# Patient Record
Sex: FEMALE | Race: BLACK OR AFRICAN AMERICAN | NOT HISPANIC OR LATINO | Employment: FULL TIME | URBAN - METROPOLITAN AREA
[De-identification: names, ages, dates, MRNs, and addresses within clinical notes are randomized per-mention and may not be internally consistent; named-entity substitution may affect disease eponyms.]

---

## 2022-09-07 PROBLEM — Z01.419 WELL WOMAN EXAM: Status: ACTIVE | Noted: 2022-09-07

## 2022-09-08 ENCOUNTER — INITIAL PRENATAL (OUTPATIENT)
Dept: OBSTETRICS AND GYNECOLOGY | Facility: CLINIC | Age: 32
End: 2022-09-08

## 2022-09-08 ENCOUNTER — LAB (OUTPATIENT)
Dept: LAB | Facility: HOSPITAL | Age: 32
End: 2022-09-08

## 2022-09-08 VITALS
BODY MASS INDEX: 34.82 KG/M2 | DIASTOLIC BLOOD PRESSURE: 86 MMHG | HEIGHT: 62 IN | SYSTOLIC BLOOD PRESSURE: 130 MMHG | WEIGHT: 189.2 LBS

## 2022-09-08 DIAGNOSIS — Z34.81 PRENATAL CARE, SUBSEQUENT PREGNANCY, FIRST TRIMESTER: ICD-10-CM

## 2022-09-08 DIAGNOSIS — O34.219 HISTORY OF CESAREAN SECTION COMPLICATING PREGNANCY: ICD-10-CM

## 2022-09-08 DIAGNOSIS — Z34.81 PRENATAL CARE, SUBSEQUENT PREGNANCY, FIRST TRIMESTER: Primary | ICD-10-CM

## 2022-09-08 LAB
ABO GROUP BLD: NORMAL
AMPHET+METHAMPHET UR QL: NEGATIVE
AMPHETAMINES UR QL: NEGATIVE
BARBITURATES UR QL SCN: NEGATIVE
BASOPHILS # BLD AUTO: 0.01 10*3/MM3 (ref 0–0.2)
BASOPHILS NFR BLD AUTO: 0.2 % (ref 0–1.5)
BENZODIAZ UR QL SCN: NEGATIVE
BLD GP AB SCN SERPL QL: NEGATIVE
BUPRENORPHINE SERPL-MCNC: NEGATIVE NG/ML
C TRACH RRNA SPEC DONR QL NAA+PROBE: NEGATIVE
CANNABINOIDS SERPL QL: NEGATIVE
COCAINE UR QL: NEGATIVE
DEPRECATED RDW RBC AUTO: 40.7 FL (ref 37–54)
EOSINOPHIL # BLD AUTO: 0.01 10*3/MM3 (ref 0–0.4)
EOSINOPHIL NFR BLD AUTO: 0.2 % (ref 0.3–6.2)
ERYTHROCYTE [DISTWIDTH] IN BLOOD BY AUTOMATED COUNT: 14.7 % (ref 12.3–15.4)
HBV SURFACE AG SERPL QL IA: NORMAL
HCT VFR BLD AUTO: 34.6 % (ref 34–46.6)
HCV AB SER DONR QL: NORMAL
HGB BLD-MCNC: 10.8 G/DL (ref 12–15.9)
HIV1+2 AB SER QL: NORMAL
IMM GRANULOCYTES # BLD AUTO: 0.02 10*3/MM3 (ref 0–0.05)
IMM GRANULOCYTES NFR BLD AUTO: 0.3 % (ref 0–0.5)
LYMPHOCYTES # BLD AUTO: 2.19 10*3/MM3 (ref 0.7–3.1)
LYMPHOCYTES NFR BLD AUTO: 33.5 % (ref 19.6–45.3)
MCH RBC QN AUTO: 24.3 PG (ref 26.6–33)
MCHC RBC AUTO-ENTMCNC: 31.2 G/DL (ref 31.5–35.7)
MCV RBC AUTO: 77.8 FL (ref 79–97)
METHADONE UR QL SCN: NEGATIVE
MONOCYTES # BLD AUTO: 0.29 10*3/MM3 (ref 0.1–0.9)
MONOCYTES NFR BLD AUTO: 4.4 % (ref 5–12)
N GONORRHOEA DNA SPEC QL NAA+PROBE: NEGATIVE
NEUTROPHILS NFR BLD AUTO: 4.02 10*3/MM3 (ref 1.7–7)
NEUTROPHILS NFR BLD AUTO: 61.4 % (ref 42.7–76)
NRBC BLD AUTO-RTO: 0 /100 WBC (ref 0–0.2)
OPIATES UR QL: NEGATIVE
OXYCODONE UR QL SCN: NEGATIVE
PCP UR QL SCN: NEGATIVE
PLATELET # BLD AUTO: 263 10*3/MM3 (ref 140–450)
PMV BLD AUTO: 13.5 FL (ref 6–12)
PROPOXYPH UR QL: NEGATIVE
RBC # BLD AUTO: 4.45 10*6/MM3 (ref 3.77–5.28)
RH BLD: POSITIVE
RPR SER QL: NORMAL
TRICYCLICS UR QL SCN: NEGATIVE
WBC NRBC COR # BLD: 6.54 10*3/MM3 (ref 3.4–10.8)

## 2022-09-08 PROCEDURE — 87086 URINE CULTURE/COLONY COUNT: CPT | Performed by: OBSTETRICS & GYNECOLOGY

## 2022-09-08 PROCEDURE — 80081 OBSTETRIC PANEL INC HIV TSTG: CPT

## 2022-09-08 PROCEDURE — 0501F PRENATAL FLOW SHEET: CPT | Performed by: OBSTETRICS & GYNECOLOGY

## 2022-09-08 PROCEDURE — 86803 HEPATITIS C AB TEST: CPT

## 2022-09-08 PROCEDURE — 80306 DRUG TEST PRSMV INSTRMNT: CPT | Performed by: OBSTETRICS & GYNECOLOGY

## 2022-09-08 RX ORDER — CLINDAMYCIN HYDROCHLORIDE 300 MG/1
CAPSULE ORAL EVERY 12 HOURS SCHEDULED
COMMUNITY
End: 2022-09-08

## 2022-09-08 NOTE — PROGRESS NOTES
"Subjective   Chief Complaint   Patient presents with   • Initial Prenatal Visit     US done today / last pap smear was ~ 2 months ago at her PCP       Aye Nance is a 32 y.o. year old .  Patient's last menstrual period was 2022.  She presents to be seen to initiate prenatal care.  She reports a remote history of abnormal Pap smears but her last one by her primary care provider at the end of July was normal.    Social History    Tobacco Use      Smoking status: Never Smoker      Smokeless tobacco: Never Used      The following portions of the patient's history were reviewed and updated as appropriate:vital signs, allergies, current medications, past family history, past medical history, past social history, past surgical history and problem list.    Objective   /86   Ht 157.5 cm (62\")   Wt 85.8 kg (189 lb 3.2 oz)   LMP 2022   BMI 34.61 kg/m²     General: well developed; well nourished  no acute distress   Skin: No suspicious lesions seen   Thyroid: normal to inspection and palpation   Heart:  Not performed.   Lungs: breathing is unlabored   Breasts:  Examined in supine position  Symmetric without masses or skin dimpling  Nipples normal without inversion, lesions or discharge  There are no palpable axillary nodes   Abdomen: soft, non-tender; no masses  no umbilical or inguinal hernias are present  no hepato-splenomegaly   Pelvis: Clinical staff was present for exam  External genitalia:  normal appearance of the external genitalia including Bartholin's and Ceylon's glands.  :  urethral meatus normal;  Vaginal:  normal pink mucosa without prolapse or lesions.  Cervix:  normal appearance.  Uterus:  normal size, shape and consistency.  Adnexa:  normal bimanual exam of the adnexa.  Rectal:  digital rectal exam not performed; anus visually normal appearing.       Lab Review   No data reviewed    Imaging   Pelvic ultrasound report    Assessment & Plan   ASSESSMENT  1. 32 y.o. year old "  at 8w0d confirmed by ultrasound today   2. Supervision of low risk pregnancy  3. Previous C/S with route of delivery to be determined    PLAN  1. The problem list for pregnancy was initiated today  2. Tests ordered today:  Orders Placed This Encounter   Procedures   • Chlamydia trachomatis, Neisseria gonorrhoeae, Trichomonas vaginalis, PCR - Swab, Cervix     Standing Status:   Future     Number of Occurrences:   1     Standing Expiration Date:   10/8/2022   • Urine Culture - Urine, Urine, Clean Catch     Standing Status:   Future     Number of Occurrences:   1     Standing Expiration Date:   2023     Order Specific Question:   Release to patient     Answer:   Immediate   • OB Panel With HIV     Standing Status:   Future     Number of Occurrences:   1     Standing Expiration Date:   2023   • Hepatitis C Antibody     Standing Status:   Future     Number of Occurrences:   1     Standing Expiration Date:   2023   • Urine Drug Screen - Urine, Clean Catch     Please confirm all positive UDS     Standing Status:   Future     Number of Occurrences:   1     Standing Expiration Date:   2023     3. Testing for GC / Chlamydia / trichomonas was done today   4. The following data needs to be obtained to update her medical records: last PAP.  5. Genetic testing reviewed: NIPT ([facility]) and they will consider the information and make a decision at a later date.  6. Information reviewed: exercise in pregnancy, nutrition in pregnancy, weight gain in pregnancy, work and travel restrictions during pregnancy, list of OTC medications acceptable in pregnancy and call coverage groups    Total time spent today with Aye  was 45-59 minutes (level 4).  Of this time, > 50% was spent face-to-face time coordinating care, answering her questions and counseling regarding pathophysiology of her presenting problem along with plans for any diagnositc work-up and treatment.    Follow up: 4 week(s)       This note was  electronically signed.    Tootie Maher MD  September 8, 2022

## 2022-09-09 LAB
BACTERIA SPEC AEROBE CULT: NORMAL
RUBV IGG SERPL IA-ACNC: 1.42 INDEX

## 2022-09-11 DIAGNOSIS — O99.019 MATERNAL ANEMIA IN PREGNANCY, ANTEPARTUM: Primary | ICD-10-CM

## 2022-09-11 RX ORDER — FERROUS SULFATE 325(65) MG
325 TABLET ORAL
Qty: 30 TABLET | Refills: 11 | Status: SHIPPED | OUTPATIENT
Start: 2022-09-11

## 2022-09-12 ENCOUNTER — DOCUMENTATION (OUTPATIENT)
Dept: OBSTETRICS AND GYNECOLOGY | Facility: CLINIC | Age: 32
End: 2022-09-12

## 2022-09-12 ENCOUNTER — TELEPHONE (OUTPATIENT)
Dept: OBSTETRICS AND GYNECOLOGY | Facility: CLINIC | Age: 32
End: 2022-09-12

## 2022-10-13 ENCOUNTER — ROUTINE PRENATAL (OUTPATIENT)
Dept: OBSTETRICS AND GYNECOLOGY | Facility: CLINIC | Age: 32
End: 2022-10-13

## 2022-10-13 ENCOUNTER — LAB (OUTPATIENT)
Dept: LAB | Facility: HOSPITAL | Age: 32
End: 2022-10-13

## 2022-10-13 VITALS — BODY MASS INDEX: 38.04 KG/M2 | SYSTOLIC BLOOD PRESSURE: 122 MMHG | DIASTOLIC BLOOD PRESSURE: 74 MMHG | WEIGHT: 208 LBS

## 2022-10-13 DIAGNOSIS — O34.219 HISTORY OF CESAREAN SECTION COMPLICATING PREGNANCY: ICD-10-CM

## 2022-10-13 DIAGNOSIS — O99.019 MATERNAL ANEMIA IN PREGNANCY, ANTEPARTUM: ICD-10-CM

## 2022-10-13 DIAGNOSIS — Z34.82 PRENATAL CARE, SUBSEQUENT PREGNANCY IN SECOND TRIMESTER: ICD-10-CM

## 2022-10-13 DIAGNOSIS — Z3A.13 13 WEEKS GESTATION OF PREGNANCY: Primary | ICD-10-CM

## 2022-10-13 LAB
FERRITIN SERPL-MCNC: 32.8 NG/ML (ref 13–150)
FOLATE SERPL-MCNC: >20 NG/ML (ref 4.78–24.2)
IRON 24H UR-MRATE: 137 MCG/DL (ref 37–145)
IRON SATN MFR SERPL: 33 % (ref 20–50)
TIBC SERPL-MCNC: 411 MCG/DL (ref 298–536)
TRANSFERRIN SERPL-MCNC: 276 MG/DL (ref 200–360)
VIT B12 BLD-MCNC: 472 PG/ML (ref 211–946)

## 2022-10-13 PROCEDURE — 82728 ASSAY OF FERRITIN: CPT

## 2022-10-13 PROCEDURE — 36415 COLL VENOUS BLD VENIPUNCTURE: CPT

## 2022-10-13 PROCEDURE — 84466 ASSAY OF TRANSFERRIN: CPT

## 2022-10-13 PROCEDURE — 82607 VITAMIN B-12: CPT

## 2022-10-13 PROCEDURE — 83540 ASSAY OF IRON: CPT

## 2022-10-13 PROCEDURE — 83020 HEMOGLOBIN ELECTROPHORESIS: CPT

## 2022-10-13 PROCEDURE — 0502F SUBSEQUENT PRENATAL CARE: CPT | Performed by: NURSE PRACTITIONER

## 2022-10-13 PROCEDURE — 82746 ASSAY OF FOLIC ACID SERUM: CPT

## 2022-10-13 NOTE — PROGRESS NOTES
Chief Complaint   Patient presents with   • Routine Prenatal Visit       HPI: Aye is a  currently at 13w0d who today reports the following:  Nausea - No; Vaginal bleeding -  No; Heartburn - No.    ROS:  GI: Constipation - YES; has some constipation with iron useDiarrhea - No    Neuro: Headache - No; Visual change - No      EXAM:  Vitals: See prenatal flowsheet   Abdomen: See prenatal flowsheet   Urine glucose/protein: See prenatal flowsheet   Pelvic: See prenatal flowsheet     Prenatal Labs  Lab Results   Component Value Date    HGB 10.8 (L) 2022    RUBELLAABIGG 1.42 2022    HEPBSAG Non-Reactive 2022    ABSCRN Negative 2022    FLD2AOV6 Non-Reactive 2022    HEPCVIRUSABY Non-Reactive 2022    URINECX 25,000 CFU/mL Mixed Mera Isolated 2022    CHLAMNAA negative 2022    NGONORRHON negative 2022       MDM:  Impression: 1. Supervision of low risk pregnancy  2. Previous C/S with route of delivery to be determined  3. Anemia in pregnancy   Tests done today: 1. nipt and cf sma carrier screening  2. Requested anemia work up labs from [facility]   Topics discussed: 1. Continue with PNV's  2. Prenatal labs reviewed  3. bleeding, pain, nausea precautions reviewed   Tests scheduled today for her next visit:   none

## 2022-10-14 LAB
HGB A MFR BLD ELPH: 97.6 % (ref 96.4–98.8)
HGB A2 MFR BLD ELPH: 2.4 % (ref 1.8–3.2)
HGB F MFR BLD ELPH: 0 % (ref 0–2)
HGB FRACT BLD-IMP: NORMAL
HGB S MFR BLD ELPH: 0 %

## 2022-10-31 ENCOUNTER — TELEPHONE (OUTPATIENT)
Dept: OBSTETRICS AND GYNECOLOGY | Facility: CLINIC | Age: 32
End: 2022-10-31

## 2022-11-02 ENCOUNTER — TELEPHONE (OUTPATIENT)
Dept: OBSTETRICS AND GYNECOLOGY | Facility: CLINIC | Age: 32
End: 2022-11-02

## 2022-11-02 NOTE — TELEPHONE ENCOUNTER
Nika Justin is wanting an ultrasound for her appt on 11/10/22. She just wants to check how the baby is doing.

## 2022-11-02 NOTE — TELEPHONE ENCOUNTER
Pt returned call to office and was advised that we could do a gender scan for $50 or she could wait until her 20 week scan when the anatomy scan is done.  Pt stated that she would wait until the 20 week scan.

## 2022-11-10 ENCOUNTER — ROUTINE PRENATAL (OUTPATIENT)
Dept: OBSTETRICS AND GYNECOLOGY | Facility: CLINIC | Age: 32
End: 2022-11-10

## 2022-11-10 VITALS — SYSTOLIC BLOOD PRESSURE: 114 MMHG | BODY MASS INDEX: 34.42 KG/M2 | DIASTOLIC BLOOD PRESSURE: 76 MMHG | WEIGHT: 188.2 LBS

## 2022-11-10 DIAGNOSIS — O34.219 HISTORY OF CESAREAN SECTION COMPLICATING PREGNANCY: ICD-10-CM

## 2022-11-10 DIAGNOSIS — Z3A.17 17 WEEKS GESTATION OF PREGNANCY: Primary | ICD-10-CM

## 2022-11-10 DIAGNOSIS — Z34.82 PRENATAL CARE, SUBSEQUENT PREGNANCY IN SECOND TRIMESTER: ICD-10-CM

## 2022-11-10 LAB
GLUCOSE UR STRIP-MCNC: ABNORMAL MG/DL
PROT UR STRIP-MCNC: NEGATIVE MG/DL

## 2022-11-10 PROCEDURE — 0502F SUBSEQUENT PRENATAL CARE: CPT | Performed by: NURSE PRACTITIONER

## 2022-11-10 NOTE — PROGRESS NOTES
Chief Complaint   Patient presents with   • Routine Prenatal Visit     Some cramping when overexerted physically or dehydrated, no c/c other than that       HPI: Aye is a  currently at 17w0d who today reports the following:  Contractions - No; Leaking - No; Vaginal bleeding -  No; Heartburn - No.    ROS:  GI: Nausea - No ; Constipation - No; Diarrhea - No    Neuro: Headache - No ; Visual change - No      EXAM:  Vitals: See prenatal flowsheet   Abdomen: See prenatal flowsheet   Urine glucose/protein: See prenatal flowsheet   Pelvic: See prenatal flowsheet     Lab Results   Component Value Date    ABO O 2022    RH Positive 2022    ABSCRN Negative 2022       MDM:  Impression: 1. Supervision of low risk pregnancy  2. Previous C/S with route of delivery to be determined   Tests done today: 1. none   Topics discussed: 1. Continue with PNV's  2. Prenatal labs reviewed  3. bleeding and nausea precautions reviewed    4. NIPT low risk results reviewed, having gender reveal at home   Tests scheduled today for her next visit:   U/S for anatomic screening

## 2022-12-08 ENCOUNTER — ROUTINE PRENATAL (OUTPATIENT)
Dept: OBSTETRICS AND GYNECOLOGY | Facility: CLINIC | Age: 32
End: 2022-12-08

## 2022-12-08 VITALS — DIASTOLIC BLOOD PRESSURE: 60 MMHG | WEIGHT: 192.8 LBS | SYSTOLIC BLOOD PRESSURE: 100 MMHG | BODY MASS INDEX: 35.26 KG/M2

## 2022-12-08 DIAGNOSIS — Z34.82 PRENATAL CARE, SUBSEQUENT PREGNANCY IN SECOND TRIMESTER: Primary | ICD-10-CM

## 2022-12-08 DIAGNOSIS — O99.019 MATERNAL ANEMIA IN PREGNANCY, ANTEPARTUM: ICD-10-CM

## 2022-12-08 DIAGNOSIS — O34.219 HISTORY OF CESAREAN SECTION COMPLICATING PREGNANCY: ICD-10-CM

## 2022-12-08 PROCEDURE — 0502F SUBSEQUENT PRENATAL CARE: CPT | Performed by: OBSTETRICS & GYNECOLOGY

## 2022-12-08 RX ORDER — DOCUSATE SODIUM 100 MG/1
100 CAPSULE, LIQUID FILLED ORAL DAILY PRN
COMMUNITY

## 2022-12-08 NOTE — PROGRESS NOTES
Chief Complaint   Patient presents with   • Routine Prenatal Visit     US done today        HPI: Aye is a  currently at 21w0d who today reports the following:  Contractions - No; Leaking - No; Vaginal bleeding -  No; Heartburn - No.    ROS:  GI: Nausea - No ; Constipation - No; Diarrhea - No    Neuro: Headache - No ; Visual change - No      EXAM:  Vitals: See prenatal flowsheet   Abdomen: See prenatal flowsheet   Urine glucose/protein: See prenatal flowsheet   Pelvic: See prenatal flowsheet     Lab Results   Component Value Date    ABO O 2022    RH Positive 2022    ABSCRN Negative 2022       MDM:  Impression: 1. Supervision of low risk pregnancy  2. Previous C/S with route of delivery to be determined   3. Anemia in pregnancy    Tests done today: 1. U/S for anatomic screening - anatomy was not completely seen today   Topics discussed: 1. Continue with PNV's  2. Prenatal labs reviewed  3. Interval of future visits    4. Continue oral iron   Tests scheduled today for her next visit:   U/S to complete the anatomic screening  GCT and CBC

## 2023-01-05 ENCOUNTER — LAB (OUTPATIENT)
Dept: LAB | Facility: HOSPITAL | Age: 33
End: 2023-01-05
Payer: COMMERCIAL

## 2023-01-05 ENCOUNTER — ROUTINE PRENATAL (OUTPATIENT)
Dept: OBSTETRICS AND GYNECOLOGY | Facility: CLINIC | Age: 33
End: 2023-01-05
Payer: COMMERCIAL

## 2023-01-05 VITALS — WEIGHT: 195 LBS | DIASTOLIC BLOOD PRESSURE: 60 MMHG | BODY MASS INDEX: 35.67 KG/M2 | SYSTOLIC BLOOD PRESSURE: 110 MMHG

## 2023-01-05 DIAGNOSIS — Z3A.25 25 WEEKS GESTATION OF PREGNANCY: Primary | ICD-10-CM

## 2023-01-05 DIAGNOSIS — Z34.82 PRENATAL CARE, SUBSEQUENT PREGNANCY IN SECOND TRIMESTER: ICD-10-CM

## 2023-01-05 DIAGNOSIS — O99.810 ABNORMAL O'SULLIVAN GLUCOSE CHALLENGE TEST, ANTEPARTUM: ICD-10-CM

## 2023-01-05 DIAGNOSIS — Z34.82 PRENATAL CARE, SUBSEQUENT PREGNANCY, SECOND TRIMESTER: Primary | ICD-10-CM

## 2023-01-05 LAB
DEPRECATED RDW RBC AUTO: 37.4 FL (ref 37–54)
ERYTHROCYTE [DISTWIDTH] IN BLOOD BY AUTOMATED COUNT: 13.9 % (ref 12.3–15.4)
GLUCOSE 1H P 100 G GLC PO SERPL-MCNC: 147 MG/DL (ref 65–139)
GLUCOSE UR STRIP-MCNC: ABNORMAL MG/DL
HCT VFR BLD AUTO: 28.6 % (ref 34–46.6)
HGB BLD-MCNC: 9.5 G/DL (ref 12–15.9)
MCH RBC QN AUTO: 24.5 PG (ref 26.6–33)
MCHC RBC AUTO-ENTMCNC: 33.2 G/DL (ref 31.5–35.7)
MCV RBC AUTO: 73.7 FL (ref 79–97)
PLATELET # BLD AUTO: 202 10*3/MM3 (ref 140–450)
PMV BLD AUTO: 13.5 FL (ref 6–12)
PROT UR STRIP-MCNC: NEGATIVE MG/DL
RBC # BLD AUTO: 3.88 10*6/MM3 (ref 3.77–5.28)
WBC NRBC COR # BLD: 7.15 10*3/MM3 (ref 3.4–10.8)

## 2023-01-05 PROCEDURE — 85027 COMPLETE CBC AUTOMATED: CPT

## 2023-01-05 PROCEDURE — 82962 GLUCOSE BLOOD TEST: CPT

## 2023-01-05 PROCEDURE — 82950 GLUCOSE TEST: CPT

## 2023-01-05 PROCEDURE — 0502F SUBSEQUENT PRENATAL CARE: CPT | Performed by: NURSE PRACTITIONER

## 2023-01-05 NOTE — PROGRESS NOTES
Chief Complaint   Patient presents with   • Routine Prenatal Visit     Ob follow up       HPI: Aye is a  currently at 25w0d who today reports the following: Completed Glucola testing today    Blood sugar was 147.  Contractions - No; Leaking - No; Vaginal bleeding -  No; Heartburn - No.    ROS:  GI: Nausea - No ; Constipation - No; Diarrhea - No    Neuro: Headache - No ; Visual change - No      EXAM:  Vitals: See prenatal flowsheet   Abdomen: See prenatal flowsheet   Urine glucose/protein: See prenatal flowsheet   Pelvic: See prenatal flowsheet     Lab Results   Component Value Date    ABO O 2022    RH Positive 2022    ABSCRN Negative 2022       MDM:  Impression: 1. Supervision of low risk pregnancy  2. Previous C/S with route of delivery to be determined   3. Anemia in pregnancy   4. Elevated 1 hour sugar glucose testing at 147   Tests done today: 1. U/S for anatomic screening - anatomy completely seen today   Topics discussed: 1. Continue with PNV's  2. Prenatal labs reviewed  3.  labor signs and symptoms  4. Symptoms of preeclampsia  5. TDAP vaccination recommended between 27-36 weeks gestation OR after birth   6. Discussed elevated 1 hour sugar test and need for 3-hour testing.   Tests scheduled today for her next visit:   gtt

## 2023-01-06 DIAGNOSIS — O99.810 ABNORMAL O'SULLIVAN GLUCOSE CHALLENGE TEST, ANTEPARTUM: Primary | ICD-10-CM

## 2023-01-19 ENCOUNTER — TELEPHONE (OUTPATIENT)
Dept: OBSTETRICS AND GYNECOLOGY | Facility: CLINIC | Age: 33
End: 2023-01-19
Payer: COMMERCIAL

## 2023-01-19 ENCOUNTER — LAB (OUTPATIENT)
Dept: LAB | Facility: HOSPITAL | Age: 33
End: 2023-01-19
Payer: COMMERCIAL

## 2023-01-19 DIAGNOSIS — O99.810 ABNORMAL O'SULLIVAN GLUCOSE CHALLENGE TEST, ANTEPARTUM: Primary | ICD-10-CM

## 2023-01-19 DIAGNOSIS — Z3A.25 25 WEEKS GESTATION OF PREGNANCY: ICD-10-CM

## 2023-01-19 DIAGNOSIS — O24.410 GDM (GESTATIONAL DIABETES MELLITUS), CLASS A1: Primary | ICD-10-CM

## 2023-01-19 LAB
GLUCOSE P FAST SERPL-MCNC: 98 MG/DL (ref 65–94)
GTT GEST 2H PNL UR+SERPL: 184 MG/DL (ref 65–179)
GTT GEST 3H PNL SERPL: 112 MG/DL (ref 65–139)
GTT GEST 3H PNL SERPL: 169 MG/DL (ref 65–154)

## 2023-01-19 PROCEDURE — 36415 COLL VENOUS BLD VENIPUNCTURE: CPT

## 2023-01-19 PROCEDURE — 82962 GLUCOSE BLOOD TEST: CPT

## 2023-01-19 PROCEDURE — 82951 GLUCOSE TOLERANCE TEST (GTT): CPT

## 2023-01-19 PROCEDURE — 82952 GTT-ADDED SAMPLES: CPT

## 2023-01-19 RX ORDER — LANCETS 28 GAUGE
EACH MISCELLANEOUS
Qty: 120 EACH | Refills: 12 | Status: SHIPPED | OUTPATIENT
Start: 2023-01-19

## 2023-01-19 RX ORDER — BLOOD-GLUCOSE METER
1 KIT MISCELLANEOUS AS NEEDED
Qty: 1 EACH | Refills: 0 | Status: SHIPPED | OUTPATIENT
Start: 2023-01-19

## 2023-01-19 NOTE — TELEPHONE ENCOUNTER
Please inform patient she failed her 3 hour glucose tolerance test which means she has gestational diabetes. I am going to place a diabetic education referral and endocrinology referral. I will also send supplies to her pharmacy for her to start taking her blood sugar 4 times daily - fasting when she first wakes up (goal <95) and 2 hours after each meal (goal <120).     Orders Placed This Encounter   Procedures   • Ambulatory Referral to Diabetic Education     Referral Priority:   Routine     Referral Type:   Health Education     Referral Reason:   Specialty Services Required     Requested Specialty:   Dietitian     Number of Visits Requested:   1   • Ambulatory Referral to Endocrinology     Referral Priority:   Routine     Referral Type:   Consultation     Referral Reason:   Specialty Services Required     Requested Specialty:   Endocrinology     Number of Visits Requested:   1     New Medications Ordered This Visit   Medications   • glucose blood test strip     Sig: Please take your blood sugar fasting (goal <95) and 2 hours after each meal (goal <120) and record.     Dispense:  120 each     Refill:  12   • Lancets (freestyle) lancets     Sig: Please take your blood sugar fasting (goal <95) and 2 hours after each meal (goal <120) and record.     Dispense:  120 each     Refill:  12   • glucose monitor monitoring kit     Si each As Needed (see below). Please check your sugar fasting (goal <95) and 2 hours after each meal (goal <120)     Dispense:  1 each     Refill:  0     Thanks, Tootie Maher MD

## 2023-01-19 NOTE — TELEPHONE ENCOUNTER
Called and informed patient of lab results and advisement from Dr. Maher in full detail, she verified understanding. The patient did have some concerns about being able to get in 4 checks a day, but she stated that she would try and discuss any issues/concerns further with Dr. Maher at her next appt.

## 2023-01-26 ENCOUNTER — OFFICE VISIT (OUTPATIENT)
Dept: ENDOCRINOLOGY | Facility: CLINIC | Age: 33
End: 2023-01-26
Payer: COMMERCIAL

## 2023-01-26 ENCOUNTER — ROUTINE PRENATAL (OUTPATIENT)
Dept: OBSTETRICS AND GYNECOLOGY | Facility: CLINIC | Age: 33
End: 2023-01-26
Payer: COMMERCIAL

## 2023-01-26 ENCOUNTER — DOCUMENTATION (OUTPATIENT)
Dept: DIABETES SERVICES | Facility: HOSPITAL | Age: 33
End: 2023-01-26
Payer: COMMERCIAL

## 2023-01-26 VITALS — DIASTOLIC BLOOD PRESSURE: 60 MMHG | SYSTOLIC BLOOD PRESSURE: 100 MMHG | WEIGHT: 196 LBS | BODY MASS INDEX: 35.85 KG/M2

## 2023-01-26 VITALS — DIASTOLIC BLOOD PRESSURE: 60 MMHG | BODY MASS INDEX: 35.85 KG/M2 | WEIGHT: 196 LBS | SYSTOLIC BLOOD PRESSURE: 100 MMHG

## 2023-01-26 DIAGNOSIS — O34.219 HISTORY OF CESAREAN SECTION COMPLICATING PREGNANCY: ICD-10-CM

## 2023-01-26 DIAGNOSIS — O99.019 MATERNAL ANEMIA IN PREGNANCY, ANTEPARTUM: ICD-10-CM

## 2023-01-26 DIAGNOSIS — Z34.90 PREGNANCY, UNSPECIFIED GESTATIONAL AGE: ICD-10-CM

## 2023-01-26 DIAGNOSIS — Z3A.28 28 WEEKS GESTATION OF PREGNANCY: ICD-10-CM

## 2023-01-26 DIAGNOSIS — O24.410 GDM (GESTATIONAL DIABETES MELLITUS), CLASS A1: Primary | ICD-10-CM

## 2023-01-26 DIAGNOSIS — Z34.83 PRENATAL CARE, SUBSEQUENT PREGNANCY IN THIRD TRIMESTER: ICD-10-CM

## 2023-01-26 DIAGNOSIS — O24.410 DIET CONTROLLED GESTATIONAL DIABETES MELLITUS (GDM) IN THIRD TRIMESTER: Primary | ICD-10-CM

## 2023-01-26 LAB
EXPIRATION DATE: NORMAL
EXPIRATION DATE: NORMAL
GLUCOSE BLDC GLUCOMTR-MCNC: 87 MG/DL (ref 70–130)
GLUCOSE UR STRIP-MCNC: ABNORMAL MG/DL
HBA1C MFR BLD: 5.8 %
Lab: NORMAL
Lab: NORMAL
PROT UR STRIP-MCNC: NEGATIVE MG/DL

## 2023-01-26 PROCEDURE — 90715 TDAP VACCINE 7 YRS/> IM: CPT | Performed by: NURSE PRACTITIONER

## 2023-01-26 PROCEDURE — 82947 ASSAY GLUCOSE BLOOD QUANT: CPT | Performed by: INTERNAL MEDICINE

## 2023-01-26 PROCEDURE — 90471 IMMUNIZATION ADMIN: CPT | Performed by: NURSE PRACTITIONER

## 2023-01-26 PROCEDURE — 83036 HEMOGLOBIN GLYCOSYLATED A1C: CPT | Performed by: INTERNAL MEDICINE

## 2023-01-26 PROCEDURE — 99203 OFFICE O/P NEW LOW 30 MIN: CPT | Performed by: INTERNAL MEDICINE

## 2023-01-26 PROCEDURE — 0502F SUBSEQUENT PRENATAL CARE: CPT | Performed by: NURSE PRACTITIONER

## 2023-01-26 NOTE — PROGRESS NOTES
Chief Complaint   Patient presents with   • Routine Prenatal Visit     Ob follow up       HPI: Aye is a  currently at 28w0d who today reports the following: She completed her 3-hour Glucola and did fail her testing.  She has yet to meet with endocrinology or diabetic educator.  She has not started testing her blood sugars yet.  She reports good fetal movement.  She has some questions about gestational diabetes and potential effects on pregnancy and baby.   Contractions - No; Leaking - No; Vaginal bleeding -  No; Heartburn - No.    ROS:  GI: Nausea - No ; Constipation - No; Diarrhea - No    Neuro: Headache - No ; Visual change - No      EXAM:  Vitals: See prenatal flowsheet   Abdomen: See prenatal flowsheet   Urine glucose/protein: See prenatal flowsheet   Pelvic: See prenatal flowsheet     Lab Results   Component Value Date    ABO O 2022    RH Positive 2022    ABSCRN Negative 2022       MDM:  Impression: 1. Supervision of high risk pregnancy  2. DM - GDMA1   3. Previous  with vaginal delivery due to be determined  4. Tdap vaccine given in office today..   Tests done today: 1. none   Topics discussed: 1. Continue with PNV's  2. Prenatal labs reviewed  3.  labor signs and symptoms  4. TDAP vaccination recommended between 27-36 weeks gestation OR after birth  5. kick counts reviewed   6. Discussed risk of uncontrolled gestational diabetes for baby.  Discussed macrosomia, blood sugar regulation issues after delivery, and increased risk of stillbirth at term.  Reviewed blood sugar monitoring with patient.  Discussed checking blood sugars 4 times daily fasting and 2 hours after meals.  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  7. Encourage patient to keep appointment with diabetic educator and endocrinology.  Discussed with patient if medications are needed for  blood sugar control endocrinology would be the ones to monitor these medications.  She verbalized understanding and agreed with plan of care   Tests scheduled today for her next visit:   U/S for EFW

## 2023-01-26 NOTE — PLAN OF CARE
Patient seen in clinic today for 60 minutes of GDM education. She has had multiple appointments today but was able to return and be seen. Reinforced previous office ed, with importance of monitoring, relaying glucose to provider as instructed, blood sugar goals, and healthy nutrition. Addressed patient specific questions and concerns. She will be followed and supported as needed. Thank you for this opportunity.

## 2023-01-26 NOTE — PROGRESS NOTES
Gestational Diabetes (Consult for MICHAEL Maher)    Eduardo Nance is a 32 y.o. female. she is being seen for consultation today at the request of Dr Maher for evaluation of newly diagnosed gestational diabetes.   Patient is a 32 y.o. female  presented with abnormal OGTT.   This is her second pregnancy, first pregnancy was uneventful. She denies having any complaints. SHe has never seen dietician and didn't start testing glucose yet.   She would like to learn more about gestational diabetes and the diet to follow.       Review of Systems  Review of Systems   Constitutional: Positive for fatigue.     Current medications:  Current Outpatient Medications   Medication Sig Dispense Refill   • docusate sodium (COLACE) 100 MG capsule Take 100 mg by mouth Daily As Needed.     • ferrous sulfate 325 (65 FE) MG tablet Take 1 tablet by mouth Daily With Breakfast. 30 tablet 11   • glucose blood test strip Please take your blood sugar fasting (goal <95) and 2 hours after each meal (goal <120) and record. 120 each 12   • glucose monitor monitoring kit 1 each As Needed (see below). Please check your sugar fasting (goal <95) and 2 hours after each meal (goal <120) 1 each 0   • Lancets (freestyle) lancets Please take your blood sugar fasting (goal <95) and 2 hours after each meal (goal <120) and record. 120 each 12   • Prenatal Vit-Fe Fumarate-FA (PRENATAL VITAMINS PO) Take  by mouth.     • Probiotic Product (PROBIOTIC DAILY PO) Take  by mouth.       No current facility-administered medications for this visit.         Objective      Vitals:    01/26/23 1202   BP: 100/60   Weight: 88.9 kg (196 lb)   Body mass index is 35.85 kg/m².  Physical Exam  Constitutional:       Appearance: Normal appearance.   Cardiovascular:      Rate and Rhythm: Normal rate and regular rhythm.      Heart sounds: Normal heart sounds.   Musculoskeletal:         General: No swelling.   Neurological:      Mental Status: She is alert and oriented to  person, place, and time.   Psychiatric:         Mood and Affect: Mood normal.         Behavior: Behavior normal.         Thought Content: Thought content normal.         LABS AND IMAGING  Office Visit on 01/26/2023   Component Date Value Ref Range Status   • Hemoglobin A1C 01/26/2023 5.8  % Final   • Lot Number 01/26/2023 [id number]   Final   • Expiration Date 01/26/2023 11/03/2024   Final   • Glucose 01/26/2023 87  70 - 130 mg/dL Final   • Lot Number 01/26/2023 [id number]   Final   • Expiration Date 01/26/2023 07/22/2023   Final   Routine Prenatal on 01/26/2023   Component Date Value Ref Range Status   • Glucose, UA 01/26/2023 250 mg/dL (A)  Negative Final   • Protein, POC 01/26/2023 Negative  Negative Final   Lab on 01/19/2023   Component Date Value Ref Range Status   • Gestational Glucose Tolerance, Fas* 01/19/2023 98 (H)  65 - 94 mg/dL Final   • Gestational Glucose Tolerance, 1 H* 01/19/2023 184 (H)  65 - 179 mg/dL Final   • Gestational Glucose Tolerance, 2 H* 01/19/2023 169 (H)  65 - 154 mg/dL Final   • Gestational Glucose Tolerance, 3 H* 01/19/2023 112  65 - 139 mg/dL Final   Routine Prenatal on 01/05/2023   Component Date Value Ref Range Status   • Glucose, UA 01/05/2023 250 mg/dL (A)  Negative Final   • Protein, POC 01/05/2023 Negative  Negative Final   Lab on 01/05/2023   Component Date Value Ref Range Status   • Gestational GCT 01/05/2023 147 (H)  65 - 139 mg/dL Final   • WBC 01/05/2023 7.15  3.40 - 10.80 10*3/mm3 Final   • RBC 01/05/2023 3.88  3.77 - 5.28 10*6/mm3 Final   • Hemoglobin 01/05/2023 9.5 (L)  12.0 - 15.9 g/dL Final   • Hematocrit 01/05/2023 28.6 (L)  34.0 - 46.6 % Final   • MCV 01/05/2023 73.7 (L)  79.0 - 97.0 fL Final   • MCH 01/05/2023 24.5 (L)  26.6 - 33.0 pg Final   • MCHC 01/05/2023 33.2  31.5 - 35.7 g/dL Final   • RDW 01/05/2023 13.9  12.3 - 15.4 % Final   • RDW-SD 01/05/2023 37.4  37.0 - 54.0 fl Final   • MPV 01/05/2023 13.5 (H)  6.0 - 12.0 fL Final   • Platelets 01/05/2023 202  140 -  450 10*3/mm3 Final       1. GDM (gestational diabetes mellitus), class A1    2. Pregnancy, unspecified gestational age        Assessment & Plan      Problem List Items Addressed This Visit        Other    GDM (gestational diabetes mellitus), class A1 - Primary    Relevant Orders    POC Glycosylated Hemoglobin (Hb A1C) (Completed)    POC Glucose, Blood (Completed)   Other Visit Diagnoses     Pregnancy, unspecified gestational age                PLAN  -  Gestational diabetes diagnosis and management reviewed. Glucose goals of < 95 fasting and < 120 2 hours postprandial reviewed. Patient was provided with testing supplies and information on diabetes diet.   She will see diabetic educator/nutritionist later today.   All her questions are answered.   She would send me a weekly glucose log and I will see her back if glucose is above the goal after starting the diet.   I have discussed the possible need for insulin therapy.   The risks associated with uncontrolled gestational diabetes and the management strategies reviewed in details, all their questions were answered.     No follow-ups on file.       I will continue reviewing weekly glucose logs and schedule appointment

## 2023-01-26 NOTE — LETTER
"VACCINE CONSENT FORM      Patient Name:  Aye Nance    Patient :  1990      I/We have read, or have been explained, the information about the diseases and the vaccines listed below.  There was an opportunity to ask questions and any questions were answered satisfactorily.  I/We believe that I/we understand the benefits and risks of the vaccines(s) cited, and ask the vaccine(s) listed below be given to me/us or the person named above (for which i have authorized to make the request).      Vaccine(s) give:    Orders Placed This Encounter   Procedures   • Tdap Vaccine Greater Than or Equal To 8yo IM         Medicare patients:    The only vaccine covered under your medical benefit is flu/pneumonia and hepatitis B.  All other may be covered under your \"Part D\" prescription plan and requires you to go to a pharmacy with a Physician orders for administration.  If you still prefer to have it administered at our office, you will receive a bill for the vaccine and administration cost.               Patient Initials                     Patient or Parent/Guardian Signature                    Date        A copy of the appropriate Centers for Disease Control and Prevention Vaccine Information Statements has been provided.   "

## 2023-02-03 ENCOUNTER — DOCUMENTATION (OUTPATIENT)
Dept: DIABETES SERVICES | Facility: HOSPITAL | Age: 33
End: 2023-02-03
Payer: COMMERCIAL

## 2023-02-09 ENCOUNTER — ROUTINE PRENATAL (OUTPATIENT)
Dept: OBSTETRICS AND GYNECOLOGY | Facility: CLINIC | Age: 33
End: 2023-02-09
Payer: COMMERCIAL

## 2023-02-09 VITALS — WEIGHT: 194 LBS | DIASTOLIC BLOOD PRESSURE: 60 MMHG | BODY MASS INDEX: 35.48 KG/M2 | SYSTOLIC BLOOD PRESSURE: 100 MMHG

## 2023-02-09 DIAGNOSIS — Z34.83 PRENATAL CARE, SUBSEQUENT PREGNANCY IN THIRD TRIMESTER: ICD-10-CM

## 2023-02-09 DIAGNOSIS — O24.410 DIET CONTROLLED GESTATIONAL DIABETES MELLITUS (GDM) IN THIRD TRIMESTER: Primary | ICD-10-CM

## 2023-02-09 DIAGNOSIS — O34.219 HISTORY OF CESAREAN SECTION COMPLICATING PREGNANCY: ICD-10-CM

## 2023-02-09 DIAGNOSIS — Z3A.30 30 WEEKS GESTATION OF PREGNANCY: ICD-10-CM

## 2023-02-09 LAB
GLUCOSE UR STRIP-MCNC: ABNORMAL MG/DL
PROT UR STRIP-MCNC: NEGATIVE MG/DL

## 2023-02-09 PROCEDURE — 0502F SUBSEQUENT PRENATAL CARE: CPT | Performed by: NURSE PRACTITIONER

## 2023-02-09 NOTE — PROGRESS NOTES
Chief Complaint   Patient presents with   • Routine Prenatal Visit     OB FOLLOW UP AFTER US       HPI: Aye is a  currently at 30w0d who today reports the following: She has met with endocrinology and diabetic educator.  She has only been testing her blood sugar sporadically as her work schedule allows and she did run out of strips today as well.  She is planning on getting more strips today.  She is having fasting values over 95.  She thinks 2 of her values over the last week have been over 95.  She sent her blood sugars to endocrinology and they recommended her to send results again by the end of the week.  As she has not had blood testing values for the last 2 days she is going to call endocrinology to discuss possible insulin as they recommended this if her fasting blood sugar stays elevated.  Contractions - No; Leaking - No; Vaginal bleeding -  No; Swelling of extremities - No.    ROS:  GI: Nausea - No; Constipation - No; Diarrhea - No    Neuro: Headache - No; Visual change - No      EXAM:  Vitals: See prenatal flowsheet   Abdomen: See prenatal flowsheet   Urine glucose/protein: See prenatal flowsheet   Pelvic: See prenatal flowsheet   MDM:   Impression: 1. Supervision of high risk pregnancy  2. DM - GDMA1 with suboptimal glucose monitoring   Tests done today: 1. U/S for EFW - 59th percentile overall 3 pounds 9 ounces head circumference 95th percentile abdominal circumference 46 percentile   Topics discussed: 1. Continue with PNV's  2. Prenatal labs reviewed  3.  labor signs and symptoms  4. kick counts reviewed   5. Risk of gestational diabetes reviewed with patient.  Discussed risk of accelerated growth and increased risk of stillbirth at term with uncontrolled gestational diabetes.  Encouraged patient to continue monitoring blood sugars fasting and 2-hour after meals.  If endocrinology recommends insulin start please let us know as we will start twice-weekly testing between 30 to 34 weeks.   Plan for repeat growth ultrasound in 4 weeks.  Patient is interested in continuous glucose monitor and will discuss this with endocrinology.  She verbalizes understanding of plan of care as is her partner who is with her in the office today.   Tests scheduled today for her next visit:   none     Of note glucose urine today was greater than 2000.

## 2023-02-11 RX ORDER — INSULIN DETEMIR 100 [IU]/ML
10 INJECTION, SOLUTION SUBCUTANEOUS NIGHTLY
Qty: 15 ML | Refills: 1 | Status: SHIPPED | OUTPATIENT
Start: 2023-02-11 | End: 2023-02-13 | Stop reason: SDUPTHER

## 2023-02-13 ENCOUNTER — TELEPHONE (OUTPATIENT)
Dept: ENDOCRINOLOGY | Facility: CLINIC | Age: 33
End: 2023-02-13
Payer: COMMERCIAL

## 2023-02-13 RX ORDER — INSULIN DETEMIR 100 [IU]/ML
10 INJECTION, SOLUTION SUBCUTANEOUS NIGHTLY
Qty: 15 ML | Refills: 1 | Status: SHIPPED | OUTPATIENT
Start: 2023-02-13 | End: 2023-03-09

## 2023-02-13 NOTE — TELEPHONE ENCOUNTER
[facility] called stated they need clarification on insulin detemir (Levemir FlexTouch) 100 UNIT/ML injection rx so they can bill her insurance. Please advise.     Call back [phone]

## 2023-02-16 DIAGNOSIS — O24.419 GESTATIONAL DIABETES MELLITUS, CLASS A2: Primary | ICD-10-CM

## 2023-02-16 PROBLEM — Z34.83 PRENATAL CARE, SUBSEQUENT PREGNANCY IN THIRD TRIMESTER: Status: ACTIVE | Noted: 2022-09-08

## 2023-02-23 ENCOUNTER — ROUTINE PRENATAL (OUTPATIENT)
Dept: OBSTETRICS AND GYNECOLOGY | Facility: CLINIC | Age: 33
End: 2023-02-23
Payer: COMMERCIAL

## 2023-02-23 VITALS — WEIGHT: 194 LBS | BODY MASS INDEX: 35.48 KG/M2 | DIASTOLIC BLOOD PRESSURE: 68 MMHG | SYSTOLIC BLOOD PRESSURE: 106 MMHG

## 2023-02-23 DIAGNOSIS — O34.219 HISTORY OF CESAREAN SECTION COMPLICATING PREGNANCY: ICD-10-CM

## 2023-02-23 DIAGNOSIS — Z34.83 PRENATAL CARE, SUBSEQUENT PREGNANCY IN THIRD TRIMESTER: Primary | ICD-10-CM

## 2023-02-23 DIAGNOSIS — O99.019 MATERNAL ANEMIA IN PREGNANCY, ANTEPARTUM: ICD-10-CM

## 2023-02-23 DIAGNOSIS — O24.419 GESTATIONAL DIABETES MELLITUS, CLASS A2: ICD-10-CM

## 2023-02-23 LAB
GLUCOSE UR STRIP-MCNC: ABNORMAL MG/DL
PROT UR STRIP-MCNC: NEGATIVE MG/DL

## 2023-02-23 PROCEDURE — 0502F SUBSEQUENT PRENATAL CARE: CPT | Performed by: OBSTETRICS & GYNECOLOGY

## 2023-02-23 NOTE — PROGRESS NOTES
Chief Complaint   Patient presents with   • Routine Prenatal Visit       HPI: Aye is a  currently at 32w0d who today reports the following:  Contractions - No; Leaking - No; Vaginal bleeding -  No; Swelling of extremities - No.  She is using most currently 10-12 units of long acting at night  Fasting this morning was 94      ROS:  GI: Nausea - No; Constipation - No; Diarrhea - No    Neuro: Headache - No; Visual change - No      EXAM:  Vitals: See prenatal flowsheet   Abdomen: See prenatal flowsheet   Urine glucose/protein: See prenatal flowsheet   Pelvic: See prenatal flowsheet   MDM:   Impression: 1. Supervision of high risk pregnancy  2. DM - GDMA2   3. History of  section with plan for repeat at c/s at ~ 39 weeks if labor does not occur by then  4. Maternal anemia    Tests done today: 1. BPP -    Topics discussed: 1. Continue with PNV's  2. Prenatal labs reviewed  3.  labor signs and symptoms  4. Symptoms of preeclampsia  5. Continue insulin per endocrinology recommendations    6. Continue oral iron  7. Continue weekly  testing    Tests scheduled today for her next visit:   NST   CBC

## 2023-03-02 ENCOUNTER — ROUTINE PRENATAL (OUTPATIENT)
Dept: OBSTETRICS AND GYNECOLOGY | Facility: CLINIC | Age: 33
End: 2023-03-02
Payer: COMMERCIAL

## 2023-03-02 ENCOUNTER — OFFICE VISIT (OUTPATIENT)
Dept: ENDOCRINOLOGY | Facility: CLINIC | Age: 33
End: 2023-03-02
Payer: COMMERCIAL

## 2023-03-02 VITALS — WEIGHT: 194 LBS | SYSTOLIC BLOOD PRESSURE: 110 MMHG | BODY MASS INDEX: 35.48 KG/M2 | DIASTOLIC BLOOD PRESSURE: 74 MMHG

## 2023-03-02 VITALS
DIASTOLIC BLOOD PRESSURE: 60 MMHG | HEIGHT: 63 IN | SYSTOLIC BLOOD PRESSURE: 90 MMHG | OXYGEN SATURATION: 99 % | HEART RATE: 68 BPM | BODY MASS INDEX: 34.38 KG/M2 | WEIGHT: 194 LBS

## 2023-03-02 DIAGNOSIS — O24.419 GESTATIONAL DIABETES MELLITUS, CLASS A2: ICD-10-CM

## 2023-03-02 DIAGNOSIS — O34.219 HISTORY OF CESAREAN SECTION COMPLICATING PREGNANCY: ICD-10-CM

## 2023-03-02 DIAGNOSIS — Z34.83 PRENATAL CARE, SUBSEQUENT PREGNANCY IN THIRD TRIMESTER: Primary | ICD-10-CM

## 2023-03-02 DIAGNOSIS — O99.019 MATERNAL ANEMIA IN PREGNANCY, ANTEPARTUM: ICD-10-CM

## 2023-03-02 DIAGNOSIS — O24.419 GESTATIONAL DIABETES MELLITUS, CLASS A2: Primary | ICD-10-CM

## 2023-03-02 LAB
GLUCOSE UR STRIP-MCNC: NEGATIVE MG/DL
PROT UR STRIP-MCNC: ABNORMAL MG/DL

## 2023-03-02 PROCEDURE — 0502F SUBSEQUENT PRENATAL CARE: CPT | Performed by: OBSTETRICS & GYNECOLOGY

## 2023-03-02 PROCEDURE — 99213 OFFICE O/P EST LOW 20 MIN: CPT | Performed by: INTERNAL MEDICINE

## 2023-03-02 PROCEDURE — 59025 FETAL NON-STRESS TEST: CPT | Performed by: OBSTETRICS & GYNECOLOGY

## 2023-03-02 NOTE — PROGRESS NOTES
Chief Complaint   Patient presents with   • Routine Prenatal Visit     NST started at 0822       HPI: Aye is a  currently at 33w0d who today reports the following:  Contractions - No; Leaking - No; Vaginal bleeding -  No; Swelling of extremities - No.    Fasting glucoses have been   She is now up to 12 units at night of long acting insulin    ROS:  GI: Nausea - No; Constipation - No; Diarrhea - No    Neuro: Headache - No; Visual change - No      EXAM:  Vitals: See prenatal flowsheet   Abdomen: See prenatal flowsheet   Urine glucose/protein: See prenatal flowsheet   Pelvic: See prenatal flowsheet   MDM:   Impression: 1. Supervision of high risk pregnancy  2. DM - GDMA2   3. History of  section with plan for repeat c/s  4. Maternal anemia on oral iron   Tests done today: 1. NST - reactive   Topics discussed: 1. Continue with PNV's  2. Prenatal labs reviewed  3.  labor signs and symptoms  4. Symptoms of preeclampsia   5. Continue insulin per endocrinology   6. Schedule repeat c/s at ~ 39 weeks   Tests scheduled today for her next visit:   U/S for EFW  U/S for BPP     Non Stress Test      Patient: Aye Nance  : 1990  MRN: [id number]  CSN: [id number]  Date: 3/2/2023    Estimated Date of Delivery: 23  Gestational Age: 33w0d    Indication for NST chronic hypertension    Total time > 30 minutes                Interpretation    Baseline  beats per minute   Accelerations  Present    Decelerations Absent       Additional Comments Reactive and reassuring       Recommendations for f/u Continue weekly  testing        This note has been electronically signed.    Tootie Maher MD

## 2023-03-02 NOTE — PROGRESS NOTES
Gestational Diabetes    Subjective    Aye Nance is a 32 y.o. female. she is being seen for follow-up of gestational diabetes.   Patient is a 32 y.o. female  presented with abnormal OGTT.   This is her second pregnancy, first pregnancy was uneventful.   We started with dietary modifications and monitoring. Started insulin Levemir 2/14/2023. She slowly titrated up the dose to 12 Units nightly.  Her morning numbers in the last few days are 69--97. After meals glucose are at goal.   Patient reported having itching and skin redness after injection. She alternated the sites and the sx resolve within 24 hours.     Review of Systems  Review of Systems   Constitutional: Positive for fatigue.   Skin: Positive for rash (at the site of injections).     Current medications:  Current Outpatient Medications   Medication Sig Dispense Refill   • docusate sodium (COLACE) 100 MG capsule Take 1 capsule by mouth Daily As Needed.     • ferrous sulfate 325 (65 FE) MG tablet Take 1 tablet by mouth Daily With Breakfast. 30 tablet 11   • glucose blood test strip Please take your blood sugar fasting (goal <95) and 2 hours after each meal (goal <120) and record. 120 each 12   • glucose monitor monitoring kit 1 each As Needed (see below). Please check your sugar fasting (goal <95) and 2 hours after each meal (goal <120) 1 each 0   • insulin detemir (Levemir FlexTouch) 100 UNIT/ML injection Inject 10 Units under the skin into the appropriate area as directed Every Night. Start at 8 units nightly and increase by 1 unit every 2-3 days until goal of glucose achieved. Max daily dose 20 units (Patient taking differently: Inject 10 Units under the skin into the appropriate area as directed Every Night. Start at 8 units nightly and increase by 1 unit every 2-3 days until goal of glucose achieved. Max daily dose 20 units  Taking 12-14 units daily) 15 mL 1   • Insulin Pen Needle 32G X 4 MM misc 1 each Every Night. 30 each 1   • Lancets  "(freestyle) lancets Please take your blood sugar fasting (goal <95) and 2 hours after each meal (goal <120) and record. 120 each 12   • Prenatal Vit-Fe Fumarate-FA (PRENATAL VITAMINS PO) Take  by mouth.     • Probiotic Product (PROBIOTIC DAILY PO) Take  by mouth.       No current facility-administered medications for this visit.         Objective      Vitals:    03/02/23 1359   BP: 90/60   Pulse: 68   SpO2: 99%   Weight: 88 kg (194 lb)   Height: 160 cm (63\")   Body mass index is 34.37 kg/m².  Physical Exam  Constitutional:       Appearance: Normal appearance.   Cardiovascular:      Rate and Rhythm: Normal rate and regular rhythm.      Heart sounds: Normal heart sounds.   Skin:     Comments: Left arm mild erythema, no warmth or tenderness.    Neurological:      Mental Status: She is alert and oriented to person, place, and time.   Psychiatric:         Mood and Affect: Mood normal.         Behavior: Behavior normal.         Thought Content: Thought content normal.         LABS AND IMAGING  Routine Prenatal on 03/02/2023   Component Date Value Ref Range Status   • Glucose, UA 03/02/2023 Negative  Negative Final   • Protein, POC 03/02/2023 Trace (A)  Negative Final   Routine Prenatal on 02/23/2023   Component Date Value Ref Range Status   • Glucose, UA 02/23/2023 3+ (A)  Negative Final   • Protein, POC 02/23/2023 Negative  Negative Final   Routine Prenatal on 02/09/2023   Component Date Value Ref Range Status   • Glucose, UA 02/09/2023 2000 mg/dL (A)  Negative Corrected   • Protein, POC 02/09/2023 Negative  Negative Final-Edited       1. Gestational diabetes mellitus, class A2        Assessment & Plan      Problem List Items Addressed This Visit        Other    Gestational diabetes mellitus, class A2 - Primary         PLAN  -  Gestational diabetes - continue Levemir and increase the dose to 13 Units. Goals of glucose < 95 fasting are preferred.   I have given her the sample of Basalgar insulin to use since she appears " to have allergic reaction to Levemir.   Continue weekly glucose log review. She will send me a new form in a few days. I have explained titration schedule.   No follow-ups on file.

## 2023-03-09 ENCOUNTER — LAB (OUTPATIENT)
Dept: LAB | Facility: HOSPITAL | Age: 33
End: 2023-03-09
Payer: COMMERCIAL

## 2023-03-09 ENCOUNTER — ROUTINE PRENATAL (OUTPATIENT)
Dept: OBSTETRICS AND GYNECOLOGY | Facility: CLINIC | Age: 33
End: 2023-03-09
Payer: COMMERCIAL

## 2023-03-09 ENCOUNTER — OFFICE VISIT (OUTPATIENT)
Dept: OBSTETRICS AND GYNECOLOGY | Facility: HOSPITAL | Age: 33
End: 2023-03-09
Payer: COMMERCIAL

## 2023-03-09 ENCOUNTER — HOSPITAL ENCOUNTER (OUTPATIENT)
Dept: WOMENS IMAGING | Facility: HOSPITAL | Age: 33
Discharge: HOME | End: 2023-03-09
Payer: COMMERCIAL

## 2023-03-09 VITALS — SYSTOLIC BLOOD PRESSURE: 108 MMHG | DIASTOLIC BLOOD PRESSURE: 64 MMHG | WEIGHT: 193.8 LBS | BODY MASS INDEX: 34.33 KG/M2

## 2023-03-09 VITALS — WEIGHT: 195 LBS | BODY MASS INDEX: 34.54 KG/M2 | DIASTOLIC BLOOD PRESSURE: 70 MMHG | SYSTOLIC BLOOD PRESSURE: 110 MMHG

## 2023-03-09 DIAGNOSIS — O24.419 GESTATIONAL DIABETES MELLITUS, CLASS A2: Primary | ICD-10-CM

## 2023-03-09 DIAGNOSIS — O34.219 HISTORY OF CESAREAN SECTION COMPLICATING PREGNANCY: ICD-10-CM

## 2023-03-09 DIAGNOSIS — O36.5931 IUGR (INTRAUTERINE GROWTH RESTRICTION) AFFECTING CARE OF MOTHER, THIRD TRIMESTER, FETUS 1: ICD-10-CM

## 2023-03-09 DIAGNOSIS — O99.019 MATERNAL ANEMIA IN PREGNANCY, ANTEPARTUM: ICD-10-CM

## 2023-03-09 DIAGNOSIS — Z3A.34 34 WEEKS GESTATION OF PREGNANCY: Primary | ICD-10-CM

## 2023-03-09 DIAGNOSIS — O24.419 GESTATIONAL DIABETES MELLITUS, CLASS A2: ICD-10-CM

## 2023-03-09 PROBLEM — O09.93 HIGH-RISK PREGNANCY IN THIRD TRIMESTER: Status: ACTIVE | Noted: 2022-09-08

## 2023-03-09 PROBLEM — O36.5930 IUGR (INTRAUTERINE GROWTH RETARDATION) AFFECTING MOTHER, THIRD TRIMESTER, NOT APPLICABLE OR UNSPECIFIED FETUS: Status: ACTIVE | Noted: 2023-03-09

## 2023-03-09 LAB
DEPRECATED RDW RBC AUTO: 39.3 FL (ref 37–54)
ERYTHROCYTE [DISTWIDTH] IN BLOOD BY AUTOMATED COUNT: 14.5 % (ref 12.3–15.4)
GLUCOSE UR STRIP-MCNC: NEGATIVE MG/DL
HCT VFR BLD AUTO: 31.4 % (ref 34–46.6)
HGB BLD-MCNC: 10.6 G/DL (ref 12–15.9)
MCH RBC QN AUTO: 25.7 PG (ref 26.6–33)
MCHC RBC AUTO-ENTMCNC: 33.8 G/DL (ref 31.5–35.7)
MCV RBC AUTO: 76 FL (ref 79–97)
PLATELET # BLD AUTO: 229 10*3/MM3 (ref 140–450)
PMV BLD AUTO: 12.2 FL (ref 6–12)
PROT UR STRIP-MCNC: ABNORMAL MG/DL
RBC # BLD AUTO: 4.13 10*6/MM3 (ref 3.77–5.28)
WBC NRBC COR # BLD: 6.83 10*3/MM3 (ref 3.4–10.8)

## 2023-03-09 PROCEDURE — 0502F SUBSEQUENT PRENATAL CARE: CPT | Performed by: NURSE PRACTITIONER

## 2023-03-09 PROCEDURE — 76815 OB US LIMITED FETUS(S): CPT

## 2023-03-09 PROCEDURE — 76820 UMBILICAL ARTERY ECHO: CPT | Performed by: OBSTETRICS & GYNECOLOGY

## 2023-03-09 PROCEDURE — 76820 UMBILICAL ARTERY ECHO: CPT

## 2023-03-09 PROCEDURE — 85027 COMPLETE CBC AUTOMATED: CPT

## 2023-03-09 PROCEDURE — 76815 OB US LIMITED FETUS(S): CPT | Performed by: OBSTETRICS & GYNECOLOGY

## 2023-03-09 RX ORDER — INSULIN GLARGINE 100 [IU]/ML
13 INJECTION, SOLUTION SUBCUTANEOUS NIGHTLY
COMMUNITY
End: 2023-03-24

## 2023-03-09 NOTE — PROGRESS NOTES
Chief Complaint   Patient presents with   • Routine Prenatal Visit     Ob follow up after US       HPI: Aye is a  currently at 34w0d who today reports the following: She has continued to do well with her blood sugar management current insulin dose to 13 units nightly.  BPP performed today .  Estimated fetal weight 50 percentile overall however AC in our office measuring in the 1st percentile.  Patient sent to [facility] for further evaluation.  BPP again  STANLEY 7.8.  Umbilical Dopplers normal.  Recommendation for twice-weekly testing with repeat ultrasound at [facility] in 1 week with umbilical Dopplers.  Contractions - No; Leaking - No; Vaginal bleeding -  No; Swelling of extremities - No.    ROS:  GI: Nausea - No; Constipation - No; Diarrhea - No    Neuro: Headache - No; Visual change - No      EXAM:  Vitals: See prenatal flowsheet   Abdomen: See prenatal flowsheet   Urine glucose/protein: See prenatal flowsheet   Pelvic: See prenatal flowsheet   MDM:   Impression: 1. Supervision of high risk pregnancy  2. DM - GDMA2  3. iugr   Tests done today: 1. BPP -    Topics discussed: 1. Continue with PNV's  2. Prenatal labs reviewed  3.  labor signs and symptoms  4. strict kick counts reviewed   5. Continue home glucose monitoring and insulin titration per endocrinology.  6. Encouraged increased rest hydration and protein.   Tests scheduled today for her next visit:   NST in 4 days

## 2023-03-09 NOTE — PROGRESS NOTES
Documentation of the ultrasound findings, images, and interpretations will be available in the patient's Viewpoint report located in the Chart Review Imaging tab in [employer].

## 2023-03-09 NOTE — PROGRESS NOTES
Pt reports she was to come here for ultrasound today, Next f/u with Nika today, NIPT low risk male

## 2023-03-13 ENCOUNTER — ROUTINE PRENATAL (OUTPATIENT)
Dept: OBSTETRICS AND GYNECOLOGY | Facility: CLINIC | Age: 33
End: 2023-03-13
Payer: COMMERCIAL

## 2023-03-13 VITALS — WEIGHT: 196 LBS | BODY MASS INDEX: 34.72 KG/M2 | DIASTOLIC BLOOD PRESSURE: 70 MMHG | SYSTOLIC BLOOD PRESSURE: 106 MMHG

## 2023-03-13 DIAGNOSIS — O24.419 GESTATIONAL DIABETES MELLITUS, CLASS A2: ICD-10-CM

## 2023-03-13 DIAGNOSIS — Z34.83 PRENATAL CARE, SUBSEQUENT PREGNANCY IN THIRD TRIMESTER: ICD-10-CM

## 2023-03-13 DIAGNOSIS — O34.219 HISTORY OF CESAREAN SECTION COMPLICATING PREGNANCY: ICD-10-CM

## 2023-03-13 DIAGNOSIS — O36.5931 IUGR (INTRAUTERINE GROWTH RESTRICTION) AFFECTING CARE OF MOTHER, THIRD TRIMESTER, FETUS 1: ICD-10-CM

## 2023-03-13 DIAGNOSIS — O99.019 MATERNAL ANEMIA IN PREGNANCY, ANTEPARTUM: ICD-10-CM

## 2023-03-13 DIAGNOSIS — Z3A.34 34 WEEKS GESTATION OF PREGNANCY: Primary | ICD-10-CM

## 2023-03-13 PROCEDURE — 59025 FETAL NON-STRESS TEST: CPT | Performed by: NURSE PRACTITIONER

## 2023-03-13 PROCEDURE — 0502F SUBSEQUENT PRENATAL CARE: CPT | Performed by: NURSE PRACTITIONER

## 2023-03-13 NOTE — PROGRESS NOTES
Chief Complaint   Patient presents with   • Routine Prenatal Visit     Ob follow up after NST       HPI: Aye is a  currently at 34w4d who today reports the following: She reports good fetal movement. She reports her blood sugars have been mostly well controlled with current insulin dose, 13 units. She had a few high sugars but this was after she treated herself to chik uriel a. Her fasting this am was 87.   Contractions - No; Leaking - No; Vaginal bleeding -  No; Swelling of extremities - No.    ROS:  GI: Nausea - No; Constipation - No; Diarrhea - No    Neuro: Headache - No; Visual change - No      EXAM:  Vitals: See prenatal flowsheet   Abdomen: See prenatal flowsheet   Urine glucose/protein: See prenatal flowsheet   Pelvic: See prenatal flowsheet  Non Stress Test      Patient: Aye Nance  : 1990  MRN: [id number]  CSN: [id number]  Date: 3/13/2023    Estimated Date of Delivery: 23  Gestational Age: 34w4d    Indication for NST gestational diabetes mellitus  IUGR       Total Time on NST > 20 minutes       Interpretation    Baseline  beats per minute   Category 1   Decelerations Absent       Additional Comments none       This note has been electronically signed.    Cyndy Ojeda CNM  3/13/2023  12:36 EDT     MDM:   Impression: 1. Supervision of high risk pregnancy  2. DM - GDMA2  3. iugr   Tests done today: 1. NST - reactive   Topics discussed: 1. Continue with PNV's  2. Prenatal labs reviewed  3.  labor signs and symptoms  4. Symptoms of preeclampsia  5. kick counts reviewed   6. Continue blood sugar monitoring and insulin per endocrinology  7. Continue twice weekly  testing   Tests scheduled today for her next visit:   STANLEY

## 2023-03-16 ENCOUNTER — DOCUMENTATION (OUTPATIENT)
Dept: DIABETES SERVICES | Facility: HOSPITAL | Age: 33
End: 2023-03-16
Payer: COMMERCIAL

## 2023-03-16 ENCOUNTER — OFFICE VISIT (OUTPATIENT)
Dept: OBSTETRICS AND GYNECOLOGY | Facility: HOSPITAL | Age: 33
End: 2023-03-16
Payer: COMMERCIAL

## 2023-03-16 ENCOUNTER — ROUTINE PRENATAL (OUTPATIENT)
Dept: OBSTETRICS AND GYNECOLOGY | Facility: CLINIC | Age: 33
End: 2023-03-16
Payer: COMMERCIAL

## 2023-03-16 ENCOUNTER — HOSPITAL ENCOUNTER (OUTPATIENT)
Dept: WOMENS IMAGING | Facility: HOSPITAL | Age: 33
Discharge: HOME | End: 2023-03-16
Admitting: OBSTETRICS & GYNECOLOGY
Payer: COMMERCIAL

## 2023-03-16 VITALS — SYSTOLIC BLOOD PRESSURE: 95 MMHG | DIASTOLIC BLOOD PRESSURE: 50 MMHG | BODY MASS INDEX: 34.83 KG/M2 | WEIGHT: 196.6 LBS

## 2023-03-16 VITALS — WEIGHT: 196.6 LBS | BODY MASS INDEX: 34.83 KG/M2

## 2023-03-16 DIAGNOSIS — O24.419 GESTATIONAL DIABETES MELLITUS, CLASS A2: Primary | ICD-10-CM

## 2023-03-16 DIAGNOSIS — O36.5930 IUGR (INTRAUTERINE GROWTH RETARDATION) AFFECTING MOTHER, THIRD TRIMESTER, NOT APPLICABLE OR UNSPECIFIED FETUS: ICD-10-CM

## 2023-03-16 DIAGNOSIS — O34.219 HISTORY OF CESAREAN SECTION COMPLICATING PREGNANCY: ICD-10-CM

## 2023-03-16 DIAGNOSIS — O09.93 HIGH-RISK PREGNANCY IN THIRD TRIMESTER: Primary | ICD-10-CM

## 2023-03-16 DIAGNOSIS — O24.419 GESTATIONAL DIABETES MELLITUS, CLASS A2: ICD-10-CM

## 2023-03-16 DIAGNOSIS — O99.019 MATERNAL ANEMIA IN PREGNANCY, ANTEPARTUM: ICD-10-CM

## 2023-03-16 PROCEDURE — 76819 FETAL BIOPHYS PROFIL W/O NST: CPT

## 2023-03-16 PROCEDURE — 76819 FETAL BIOPHYS PROFIL W/O NST: CPT | Performed by: OBSTETRICS & GYNECOLOGY

## 2023-03-16 PROCEDURE — 76820 UMBILICAL ARTERY ECHO: CPT

## 2023-03-16 PROCEDURE — 76820 UMBILICAL ARTERY ECHO: CPT | Performed by: OBSTETRICS & GYNECOLOGY

## 2023-03-16 PROCEDURE — 0502F SUBSEQUENT PRENATAL CARE: CPT | Performed by: OBSTETRICS & GYNECOLOGY

## 2023-03-16 NOTE — PROGRESS NOTES
Documentation of the ultrasound findings, images, and interpretations will be available in the patient's Viewpoint report located in the Chart Review Imaging tab in [employer].

## 2023-03-16 NOTE — PROGRESS NOTES
Chief Complaint   Patient presents with   • Routine Prenatal Visit     US done today at [facility]       HPI: Aye is a  currently at 35w0d who today reports the following:  Contractions - No; Leaking - No; Vaginal bleeding -  No; Swelling of extremities - No.  13 units of long acting at night - fasting glucose 88 this morning     ROS:  GI: Nausea - No; Constipation - No; Diarrhea - No    Neuro: Headache - No; Visual change - No      EXAM:  Vitals: See prenatal flowsheet   Abdomen: See prenatal flowsheet   Urine glucose/protein: See prenatal flowsheet   Pelvic: See prenatal flowsheet   MDM:   Impression: 1. Supervision of high risk pregnancy  2. DM - GDMA2   3. Borderline IUGR  4. Maternal anemia    Tests done today: 1. U/S for PDC follow up - BPP , UA dopplers    Topics discussed: 1. Continue with PNV's  2. Prenatal labs reviewed  3. Labor signs and symptoms  4. Symptoms of preeclampsia  5. Continue insulin per endocrinology    Tests scheduled today for her next visit:   NST   GBS next week

## 2023-03-16 NOTE — PLAN OF CARE
Spoke briefly with patient via phone. She voices that she has been doing and feeling well. She is at goal with most readings. She is scheduled for a repeat c section soon. I encouraged her to reach out for any needed support. Thank you for this opportunity.

## 2023-03-20 ENCOUNTER — ROUTINE PRENATAL (OUTPATIENT)
Dept: OBSTETRICS AND GYNECOLOGY | Facility: CLINIC | Age: 33
End: 2023-03-20
Payer: COMMERCIAL

## 2023-03-20 VITALS — WEIGHT: 197 LBS | BODY MASS INDEX: 34.9 KG/M2 | SYSTOLIC BLOOD PRESSURE: 110 MMHG | DIASTOLIC BLOOD PRESSURE: 70 MMHG

## 2023-03-20 DIAGNOSIS — Z36.85 ANTENATAL SCREENING FOR STREPTOCOCCUS B: ICD-10-CM

## 2023-03-20 DIAGNOSIS — O24.419 GESTATIONAL DIABETES MELLITUS, CLASS A2: ICD-10-CM

## 2023-03-20 DIAGNOSIS — O99.019 MATERNAL ANEMIA IN PREGNANCY, ANTEPARTUM: ICD-10-CM

## 2023-03-20 DIAGNOSIS — O36.5930 IUGR (INTRAUTERINE GROWTH RETARDATION) AFFECTING MOTHER, THIRD TRIMESTER, NOT APPLICABLE OR UNSPECIFIED FETUS: ICD-10-CM

## 2023-03-20 DIAGNOSIS — O09.93 HIGH-RISK PREGNANCY IN THIRD TRIMESTER: Primary | ICD-10-CM

## 2023-03-20 DIAGNOSIS — O34.219 HISTORY OF CESAREAN SECTION COMPLICATING PREGNANCY: ICD-10-CM

## 2023-03-20 LAB
GLUCOSE UR STRIP-MCNC: ABNORMAL MG/DL
GP B STREP RRNA SPEC QL PROBE: POSITIVE
PROT UR STRIP-MCNC: NEGATIVE MG/DL

## 2023-03-20 PROCEDURE — 59025 FETAL NON-STRESS TEST: CPT | Performed by: OBSTETRICS & GYNECOLOGY

## 2023-03-20 PROCEDURE — 0502F SUBSEQUENT PRENATAL CARE: CPT | Performed by: OBSTETRICS & GYNECOLOGY

## 2023-03-20 NOTE — PROGRESS NOTES
Chief Complaint   Patient presents with   • Routine Prenatal Visit     NST started at 0803       HPI: Aye is a  currently at 35w4d who today reports the following:  Contractions - No; Leaking - No; Vaginal bleeding -  No; Swelling of extremities - No.    ROS:  GI: Nausea - No; Constipation - No; Diarrhea - No    Neuro: Headache - No; Visual change - No      EXAM:  Vitals: See prenatal flowsheet   Abdomen: See prenatal flowsheet   Urine glucose/protein: See prenatal flowsheet   Pelvic: See prenatal flowsheet   MDM:   Impression: 1. Supervision of high risk pregnancy  2. History of  section with plan for repeat  section   3. DM - GDMA2   4. Borderline IUGR  5. Maternal anemia    Tests done today: 1. NST - reactive   2. GBS testing    Topics discussed: 1. Continue with PNV's  2. Prenatal labs reviewed  3.  labor signs and symptoms  4. Symptoms of preeclampsia  5. Continue insulin per endocrinology    Tests scheduled today for her next visit:   U/S for STANLEY only today   PDC Scan on Thursday     Non Stress Test      Patient: Aye Nance  : 1990  MRN: [id number]  CSN: [id number]  Date: 3/20/2023    Estimated Date of Delivery: 23  Gestational Age: 35w4d    Indication for NST gestational diabetes mellitus    Total time > 30 minutes                Interpretation    Baseline  beats per minute   Accelerations  Present   Decelerations Absent  Possibly small VD or coming off monitor        Additional Comments Reactive and reassuring       Recommendations for f/u STANLEY only u/s today. PDC scan Thursday        This note has been electronically signed.    Tootie Maher MD

## 2023-03-23 ENCOUNTER — HOSPITAL ENCOUNTER (OUTPATIENT)
Dept: WOMENS IMAGING | Facility: HOSPITAL | Age: 33
Discharge: HOME | End: 2023-03-23
Admitting: OBSTETRICS & GYNECOLOGY
Payer: COMMERCIAL

## 2023-03-23 ENCOUNTER — OFFICE VISIT (OUTPATIENT)
Dept: OBSTETRICS AND GYNECOLOGY | Facility: HOSPITAL | Age: 33
End: 2023-03-23
Payer: COMMERCIAL

## 2023-03-23 VITALS — WEIGHT: 197.4 LBS | BODY MASS INDEX: 34.97 KG/M2 | DIASTOLIC BLOOD PRESSURE: 63 MMHG | SYSTOLIC BLOOD PRESSURE: 105 MMHG

## 2023-03-23 DIAGNOSIS — O24.419 GESTATIONAL DIABETES MELLITUS, CLASS A2: ICD-10-CM

## 2023-03-23 DIAGNOSIS — Z34.90 PREGNANCY, UNSPECIFIED GESTATIONAL AGE: ICD-10-CM

## 2023-03-23 DIAGNOSIS — O36.5930 IUGR (INTRAUTERINE GROWTH RETARDATION) AFFECTING MOTHER, THIRD TRIMESTER, NOT APPLICABLE OR UNSPECIFIED FETUS: ICD-10-CM

## 2023-03-23 DIAGNOSIS — O34.219 HISTORY OF CESAREAN SECTION COMPLICATING PREGNANCY: Primary | ICD-10-CM

## 2023-03-23 DIAGNOSIS — O99.019 MATERNAL ANEMIA IN PREGNANCY, ANTEPARTUM: ICD-10-CM

## 2023-03-23 DIAGNOSIS — O34.219 HISTORY OF CESAREAN SECTION COMPLICATING PREGNANCY: ICD-10-CM

## 2023-03-23 PROCEDURE — 76816 OB US FOLLOW-UP PER FETUS: CPT

## 2023-03-23 PROCEDURE — 76819 FETAL BIOPHYS PROFIL W/O NST: CPT

## 2023-03-23 PROCEDURE — 76816 OB US FOLLOW-UP PER FETUS: CPT | Performed by: OBSTETRICS & GYNECOLOGY

## 2023-03-23 NOTE — PROGRESS NOTES
Documentation of the ultrasound findings, images, and interpretations will be available in the patient's Viewpoint report located in the Chart Review Imaging tab in [employer].

## 2023-03-24 RX ORDER — INSULIN GLARGINE 100 [IU]/ML
14 INJECTION, SOLUTION SUBCUTANEOUS NIGHTLY
Qty: 3 ML | Refills: 1 | Status: SHIPPED | OUTPATIENT
Start: 2023-03-24

## 2023-03-25 ENCOUNTER — DOCUMENTATION (OUTPATIENT)
Dept: OBSTETRICS AND GYNECOLOGY | Facility: CLINIC | Age: 33
End: 2023-03-25
Payer: COMMERCIAL

## 2023-03-25 PROBLEM — O99.820 GBS (GROUP B STREPTOCOCCUS CARRIER), +RV CULTURE, CURRENTLY PREGNANT: Status: ACTIVE | Noted: 2023-03-25

## 2023-03-27 ENCOUNTER — ROUTINE PRENATAL (OUTPATIENT)
Dept: OBSTETRICS AND GYNECOLOGY | Facility: CLINIC | Age: 33
End: 2023-03-27
Payer: COMMERCIAL

## 2023-03-27 VITALS — DIASTOLIC BLOOD PRESSURE: 70 MMHG | BODY MASS INDEX: 35.11 KG/M2 | WEIGHT: 198.2 LBS | SYSTOLIC BLOOD PRESSURE: 104 MMHG

## 2023-03-27 DIAGNOSIS — Z3A.36 36 WEEKS GESTATION OF PREGNANCY: Primary | ICD-10-CM

## 2023-03-27 DIAGNOSIS — O34.219 HISTORY OF CESAREAN SECTION COMPLICATING PREGNANCY: ICD-10-CM

## 2023-03-27 DIAGNOSIS — O24.419 GESTATIONAL DIABETES MELLITUS, CLASS A2: ICD-10-CM

## 2023-03-27 DIAGNOSIS — O36.5930 IUGR (INTRAUTERINE GROWTH RETARDATION) AFFECTING MOTHER, THIRD TRIMESTER, NOT APPLICABLE OR UNSPECIFIED FETUS: ICD-10-CM

## 2023-03-27 DIAGNOSIS — Z34.83 PRENATAL CARE, SUBSEQUENT PREGNANCY IN THIRD TRIMESTER: ICD-10-CM

## 2023-03-27 LAB
GLUCOSE UR STRIP-MCNC: NEGATIVE MG/DL
PROT UR STRIP-MCNC: NEGATIVE MG/DL

## 2023-03-27 NOTE — PROGRESS NOTES
"Chief Complaint   Patient presents with   • Routine Prenatal Visit     No c/c, NST started at 9:12a       HPI: Aye is a  currently at 36w4d who today reports the following: She admits to not checking her blood sugars last week as she was \"not in a mentally good space to check her blood sugars.\"  She has continued her insulin at her recommended dose per endocrinology.  She did check her fasting sugar this morning which was 94.  Contractions - No; Leaking - No; Vaginal bleeding -  No; Swelling of extremities - No.    ROS:  GI: Nausea - No; Constipation - No; Diarrhea - No    Neuro: Headache - No; Visual change - No      EXAM:  Vitals: See prenatal flowsheet   Abdomen: See prenatal flowsheet   Urine glucose/protein: See prenatal flowsheet   Pelvic: See prenatal flowsheet  Non Stress Test      Patient: Aye Nance  : 1990  MRN: [id number]  CSN: [id number]  Date: 3/27/2023    Estimated Date of Delivery: 23  Gestational Age: 36w4d    Indication for NST gestational diabetes mellitus       Total Time on NST > 20 minutes       Interpretation    Baseline  beats per minute   Category 1 non reactive   Decelerations Absent       Additional Comments none       This note has been electronically signed.    Cyndy Ojeda CNM  3/27/2023  10:32 EDT     MDM:   Impression: 1. Supervision of high risk pregnancy  2. DM - GDMA2 poor compliance with blood sugar monitoring   Tests done today: 1. BPP - 8 / 8  2. NST - nonreactive   3. santino 7.3   Topics discussed: 1. Continue with PNV's  2. Prenatal labs reviewed  3.  labor signs and symptoms  4. kick counts reviewed   5. Continue twice weekly  testing.  6. Continue blood sugar management and insulin per endocrinology recommendations   Tests scheduled today for her next visit:   BPP       "

## 2023-03-30 ENCOUNTER — ROUTINE PRENATAL (OUTPATIENT)
Dept: OBSTETRICS AND GYNECOLOGY | Facility: CLINIC | Age: 33
End: 2023-03-30
Payer: COMMERCIAL

## 2023-03-30 VITALS — BODY MASS INDEX: 35.25 KG/M2 | SYSTOLIC BLOOD PRESSURE: 130 MMHG | DIASTOLIC BLOOD PRESSURE: 88 MMHG | WEIGHT: 199 LBS

## 2023-03-30 DIAGNOSIS — O34.219 HISTORY OF CESAREAN SECTION COMPLICATING PREGNANCY: ICD-10-CM

## 2023-03-30 DIAGNOSIS — O24.419 GESTATIONAL DIABETES MELLITUS, CLASS A2: ICD-10-CM

## 2023-03-30 DIAGNOSIS — O09.93 HIGH-RISK PREGNANCY IN THIRD TRIMESTER: Primary | ICD-10-CM

## 2023-03-30 DIAGNOSIS — O99.019 MATERNAL ANEMIA IN PREGNANCY, ANTEPARTUM: ICD-10-CM

## 2023-03-30 DIAGNOSIS — O99.820 GBS (GROUP B STREPTOCOCCUS CARRIER), +RV CULTURE, CURRENTLY PREGNANT: ICD-10-CM

## 2023-03-30 PROBLEM — O36.5930 IUGR (INTRAUTERINE GROWTH RETARDATION) AFFECTING MOTHER, THIRD TRIMESTER, NOT APPLICABLE OR UNSPECIFIED FETUS: Status: RESOLVED | Noted: 2023-03-09 | Resolved: 2023-03-30

## 2023-03-30 LAB
GLUCOSE UR STRIP-MCNC: ABNORMAL MG/DL
PROT UR STRIP-MCNC: NEGATIVE MG/DL

## 2023-03-30 NOTE — PROGRESS NOTES
Chief Complaint   Patient presents with   • Routine Prenatal Visit     US done today       HPI: Aye is a  currently at 37w0d who today reports the following:  Contractions - No; Leaking - No; Vaginal bleeding -  No; Swelling of extremities - No.  She is doing 15 units of levemir at night   Fasting have in the past week been less than 95  2 hours have been below goal    ROS:  GI: Nausea - No; Constipation - No; Diarrhea - No    Neuro: Headache - No; Visual change - No      EXAM:  Vitals: See prenatal flowsheet   Abdomen: See prenatal flowsheet   Urine glucose/protein: See prenatal flowsheet   Pelvic: See prenatal flowsheet   MDM:   Impression: 1. Supervision of high risk pregnancy  2. DM - GDMA2   3. History of c/s with plan for repeat c/s  4. GBS (+)   Tests done today: 1. BPP -    Topics discussed: 1. Continue with PNV's  2. Prenatal labs reviewed  3. Labor signs and symptoms  4. Symptoms of preeclampsia  5. Continue insulin management per endocrinology recommendations    Tests scheduled today for her next visit:   ALANNAH

## 2023-04-02 NOTE — PROGRESS NOTES
Chief Complaint   Patient presents with   • Routine Prenatal Visit     No complaints   • Non-stress Test     GDM       HPI: Aye is a  currently at 37w4d who today reports the following:  Contractions - No; Leaking - No; Vaginal bleeding -  No; endorses normal fetal movement.    She is doing 15 units of levemir at night, but will be increasing to 16u today.       EXAM:  Vitals: See prenatal flowsheet   Abdomen: See prenatal flowsheet   Urine glucose/protein: See prenatal flowsheet   Pelvic: See prenatal flowsheet   MDM:   Impression: 1. Supervision of high risk pregnancy  2. DM - GDMA2   3. History of c/s with plan for repeat c/s  4. GBS (+)     Tests done today: 1. NST: non reactive, only 1 accel  2. BPP: , STANLEY 7.5cm   3. totoal BPP: 8/10 (-2 NST)      Topics discussed: 1. Continue with PNV's  2. Prenatal labs reviewed  3. Labor signs and symptoms  4. Symptoms of preeclampsia  5. Continue insulin management per endocrinology recommendations   6. Discussed STANLEY and insulin requirements with MFM Dr. White who recommends delivery at 38 weeks as opposed to 39 weeks. Dr. Maher is out of office this week, and on call provider Friday (when patient is 38 weeks) is Dr. Cortez. Will try to arrange patient to meet Dr. Cortez this week and plan on CD this coming Friday or Saturday.    Tests scheduled today for her next visit:   BPP     Gabriela Piedra MD     Non Stress Test      Patient: Aye Nance  : 1990  MRN: [id number]  [facility]: [id number]  Date: 4/3/2023    Estimated Date of Delivery: 23  Gestational Age: 37w4d    Indication for NST diabetes mellitus       Total Time on NST > 20 minutes       Interpretation    Baseline  beats per minute   Category 1   Decelerations Absent       Additional Comments Non reactive NST due to only 1 accel        This note has been electronically signed.    Gabriela Piedra MD  4/3/2023  12:50 EDT

## 2023-04-03 ENCOUNTER — ROUTINE PRENATAL (OUTPATIENT)
Dept: OBSTETRICS AND GYNECOLOGY | Facility: CLINIC | Age: 33
End: 2023-04-03
Payer: COMMERCIAL

## 2023-04-03 ENCOUNTER — DOCUMENTATION (OUTPATIENT)
Dept: OBSTETRICS AND GYNECOLOGY | Facility: HOSPITAL | Age: 33
End: 2023-04-03
Payer: COMMERCIAL

## 2023-04-03 VITALS — BODY MASS INDEX: 35.27 KG/M2 | WEIGHT: 199.1 LBS | SYSTOLIC BLOOD PRESSURE: 104 MMHG | DIASTOLIC BLOOD PRESSURE: 76 MMHG

## 2023-04-03 DIAGNOSIS — O34.219 HISTORY OF CESAREAN SECTION COMPLICATING PREGNANCY: ICD-10-CM

## 2023-04-03 DIAGNOSIS — O99.820 GBS (GROUP B STREPTOCOCCUS CARRIER), +RV CULTURE, CURRENTLY PREGNANT: ICD-10-CM

## 2023-04-03 DIAGNOSIS — O24.419 GESTATIONAL DIABETES MELLITUS, CLASS A2: ICD-10-CM

## 2023-04-03 DIAGNOSIS — Z3A.37 37 WEEKS GESTATION OF PREGNANCY: ICD-10-CM

## 2023-04-03 DIAGNOSIS — O24.419 GESTATIONAL DIABETES MELLITUS, CLASS A2: Primary | ICD-10-CM

## 2023-04-03 DIAGNOSIS — O09.93 HIGH-RISK PREGNANCY IN THIRD TRIMESTER: Primary | ICD-10-CM

## 2023-04-03 LAB
GLUCOSE UR STRIP-MCNC: ABNORMAL MG/DL
PROT UR STRIP-MCNC: ABNORMAL MG/DL

## 2023-04-03 PROCEDURE — 59025 FETAL NON-STRESS TEST: CPT | Performed by: STUDENT IN AN ORGANIZED HEALTH CARE EDUCATION/TRAINING PROGRAM

## 2023-04-03 PROCEDURE — 0502F SUBSEQUENT PRENATAL CARE: CPT | Performed by: STUDENT IN AN ORGANIZED HEALTH CARE EDUCATION/TRAINING PROGRAM

## 2023-04-03 NOTE — PROGRESS NOTES
Patient clinical course discussed with Dr Piedra   Ms Nance is a 32 year old  at 37 weeks 5 days with pregnancy complicated by poorly controlled A2DM. Continues to require titration of PM insulin.   Recommend continued twice weekly testing with planned delivery at 37 - 38 weeks GA

## 2023-04-06 ENCOUNTER — ROUTINE PRENATAL (OUTPATIENT)
Dept: OBSTETRICS AND GYNECOLOGY | Facility: CLINIC | Age: 33
End: 2023-04-06
Payer: COMMERCIAL

## 2023-04-06 VITALS — SYSTOLIC BLOOD PRESSURE: 110 MMHG | BODY MASS INDEX: 35.07 KG/M2 | WEIGHT: 198 LBS | DIASTOLIC BLOOD PRESSURE: 80 MMHG

## 2023-04-06 DIAGNOSIS — O41.03X0 OLIGOHYDRAMNIOS IN THIRD TRIMESTER, SINGLE OR UNSPECIFIED FETUS: ICD-10-CM

## 2023-04-06 DIAGNOSIS — O09.93 HIGH-RISK PREGNANCY IN THIRD TRIMESTER: Primary | ICD-10-CM

## 2023-04-06 LAB
GLUCOSE UR STRIP-MCNC: ABNORMAL MG/DL
PROT UR STRIP-MCNC: NEGATIVE MG/DL

## 2023-04-06 PROCEDURE — 0502F SUBSEQUENT PRENATAL CARE: CPT | Performed by: OBSTETRICS & GYNECOLOGY

## 2023-04-06 NOTE — PROGRESS NOTES
Chief Complaint   Patient presents with   • Routine Prenatal Visit       HPI: Aye is a  currently at 38w0d who today comes in today to meet me for routine prenatal visit.  She was scheduled to have a repeat  section next week.  She saw Dr. Piedra earlier in the week.  Due to concerns about insulin needs and blood sugar along with amniotic fluid discussion was had with Dr. White and decision was made to move the  up sooner.  If at all possible she would prefer to wait until such time as Dr. Maher returns to have the  performed    I had a chance to review her records.  Her biophysical the other day was 8/8 with an STANLEY of 7.5.  Maximum vertical pocket was normal.  Her insulin levels have been stable.  She is actually requiring slightly more insulin to control her blood sugars.  Her fasting blood sugars are typically less than 100 and her 2-hour postprandial blood sugars are typically less than 120.  Fetal movement remains normal.    ROS:  GI: Nausea - No; Constipation - No; Diarrhea - No    Neuro: Headache - No; Visual change - No    Respiratory: Cough - No; SOB - No; fever - No     EXAM:  Vitals: See prenatal flowsheet   Abdomen: See prenatal flowsheet   Urine glucose/protein: See prenatal flowsheet   Pelvic: See prenatal flowsheet   MDM:   Impression: 1. Supervision of high risk pregnancy  2. DM - GDMA2  3. Previous C/S with planned repeat C/S with overall good glycemic control  4. Borderline low amniotic fluid but normal MVP   Tests done today: 1. BPP - 8 / 8 with MVP 3.9 and STANLEY = 6.3   Topics discussed: 1. Continue with PNV's  2. Prenatal labs reviewed  3. At this point I do not think there is an indication for early delivery based upon amniotic fluid alone, insulin or glycemic control.  4. I do think a repeat STANLEY is indicated and if that remains normal I think we can delay her  section until at least next week when Nika returns   Tests scheduled today for her  next visit:   STANLEY only tomorrow

## 2023-04-07 ENCOUNTER — ANESTHESIA EVENT (OUTPATIENT)
Dept: LABOR AND DELIVERY | Facility: HOSPITAL | Age: 33
End: 2023-04-07
Payer: COMMERCIAL

## 2023-04-07 ENCOUNTER — ROUTINE PRENATAL (OUTPATIENT)
Dept: OBSTETRICS AND GYNECOLOGY | Facility: CLINIC | Age: 33
End: 2023-04-07
Payer: COMMERCIAL

## 2023-04-07 DIAGNOSIS — O09.93 HIGH-RISK PREGNANCY IN THIRD TRIMESTER: Primary | ICD-10-CM

## 2023-04-07 DIAGNOSIS — O34.219 HISTORY OF CESAREAN SECTION COMPLICATING PREGNANCY: ICD-10-CM

## 2023-04-07 PROCEDURE — 0502F SUBSEQUENT PRENATAL CARE: CPT | Performed by: OBSTETRICS & GYNECOLOGY

## 2023-04-08 ENCOUNTER — ANESTHESIA (OUTPATIENT)
Dept: LABOR AND DELIVERY | Facility: HOSPITAL | Age: 33
End: 2023-04-08
Payer: COMMERCIAL

## 2023-04-10 ENCOUNTER — PRIOR AUTHORIZATION (OUTPATIENT)
Dept: ENDOCRINOLOGY | Facility: CLINIC | Age: 33
End: 2023-04-10
Payer: COMMERCIAL

## 2023-04-10 ENCOUNTER — ROUTINE PRENATAL (OUTPATIENT)
Dept: OBSTETRICS AND GYNECOLOGY | Facility: CLINIC | Age: 33
End: 2023-04-10
Payer: COMMERCIAL

## 2023-04-10 VITALS — DIASTOLIC BLOOD PRESSURE: 60 MMHG | WEIGHT: 202 LBS | SYSTOLIC BLOOD PRESSURE: 90 MMHG | BODY MASS INDEX: 35.78 KG/M2

## 2023-04-10 DIAGNOSIS — O09.93 HIGH-RISK PREGNANCY IN THIRD TRIMESTER: Primary | ICD-10-CM

## 2023-04-10 DIAGNOSIS — O99.820 GBS (GROUP B STREPTOCOCCUS CARRIER), +RV CULTURE, CURRENTLY PREGNANT: ICD-10-CM

## 2023-04-10 DIAGNOSIS — O34.219 HISTORY OF CESAREAN SECTION COMPLICATING PREGNANCY: ICD-10-CM

## 2023-04-10 DIAGNOSIS — Z36.89 NST (NON-STRESS TEST) REACTIVE: ICD-10-CM

## 2023-04-10 DIAGNOSIS — O24.419 GESTATIONAL DIABETES MELLITUS, CLASS A2: ICD-10-CM

## 2023-04-10 LAB
GLUCOSE UR STRIP-MCNC: ABNORMAL MG/DL
PROT UR STRIP-MCNC: NEGATIVE MG/DL

## 2023-04-10 NOTE — PROGRESS NOTES
Chief Complaint   Patient presents with   • Routine Prenatal Visit     US done today /cramping a lot more / NST started at 1151       HPI: Aye is a  currently at 38w4d who today reports the following:  Contractions - YES - but less than 4/hour AND no associated change in vaginal discharge; Leaking - No; Vaginal bleeding -  No; Swelling of extremities - No.  Fasting - she reports they have been < 95  2 hours PP - she reports they have been < 120 but does admit she has not been checking them consistently.     ROS:  GI: Nausea - No; Constipation - No; Diarrhea - No    Neuro: Headache - No; Visual change - No      EXAM:  Vitals: See prenatal flowsheet   Abdomen: See prenatal flowsheet   Urine glucose/protein: See prenatal flowsheet   Pelvic: See prenatal flowsheet   MDM:   Impression: 1. Supervision of high risk pregnancy  2. DM - GDMA2 - recent fasting and 2 hours PP have been normal in the past few days per her and recent long acting at night has been 16 units   3. Previous C/S with planned repeat C/S   4. Borderline low amniotic fluid but normal MVP   Tests done today: 1. BPP - 8 / 8  2. NST - reactive   Topics discussed: 1. Continue with PNV's  2. Prenatal labs reviewed  3. We had an extensive discussion last week regarding her care last week.  A discussion was had with Dr. White with [facility] and given her recent glucose requirements and lower normal amniotic fluid index recommendation was made for delivery by 37 to 38 weeks.  She had follow-up at the end of last week and STANLEY was improved.  Today she reports over the past few days her fasting and 2-hour postprandials have been within goal.  Her BPP was 8 out of 8 with an STANLEY of 6.7.  NST today was reactive and reassuring.  However we reviewed given previous recommendation by [facility] we will get her  moved up earlier this week.  Plan will be for repeat  on Wednesday at 10:30 AM.  She knows to arrive at 8:30 AM and to be n.p.o. after  midnight.  ERAS paperwork provided   Tests scheduled today for her next visit:   C/S  at 1030 am     Non Stress Test      Patient: Aye Nance  : 1990  MRN: [id number]  CSN: [id number]  Date: 4/10/2023    Estimated Date of Delivery: 23  Gestational Age: 38w4d    Indication for NST gestational diabetes mellitus    Total time > 30 minutes                Interpretation    Baseline  beats per minute   Accelerations  Present   Decelerations Absent       Additional Comments Reactive and reassuring        Recommendations for f/u See above       This note has been electronically signed.    Tootie Maher MD

## 2023-04-10 NOTE — TELEPHONE ENCOUNTER
Received fax from [facility] [place]  One touch  Ultra is not covered  Left message for the patient to contact the insurance to inquire which meter is covered and then call me back

## 2023-04-11 ENCOUNTER — PREP FOR SURGERY (OUTPATIENT)
Dept: OTHER | Facility: HOSPITAL | Age: 33
End: 2023-04-11
Payer: COMMERCIAL

## 2023-04-11 DIAGNOSIS — O09.93 HIGH-RISK PREGNANCY IN THIRD TRIMESTER: ICD-10-CM

## 2023-04-11 DIAGNOSIS — O34.219 HISTORY OF CESAREAN SECTION COMPLICATING PREGNANCY: ICD-10-CM

## 2023-04-11 DIAGNOSIS — O24.419 GESTATIONAL DIABETES MELLITUS, CLASS A2: Primary | ICD-10-CM

## 2023-04-11 DIAGNOSIS — O99.019 MATERNAL ANEMIA IN PREGNANCY, ANTEPARTUM: ICD-10-CM

## 2023-04-11 RX ORDER — OXYTOCIN/0.9 % SODIUM CHLORIDE 30/500 ML
250 PLASTIC BAG, INJECTION (ML) INTRAVENOUS CONTINUOUS
Status: CANCELLED | OUTPATIENT
Start: 2023-04-11 | End: 2023-04-11

## 2023-04-11 RX ORDER — OXYTOCIN/0.9 % SODIUM CHLORIDE 30/500 ML
999 PLASTIC BAG, INJECTION (ML) INTRAVENOUS ONCE
Status: CANCELLED | OUTPATIENT
Start: 2023-04-11 | End: 2023-04-11

## 2023-04-11 RX ORDER — METHYLERGONOVINE MALEATE 0.2 MG/ML
200 INJECTION INTRAVENOUS AS NEEDED
Status: CANCELLED | OUTPATIENT
Start: 2023-04-11

## 2023-04-11 RX ORDER — CARBOPROST TROMETHAMINE 250 UG/ML
250 INJECTION, SOLUTION INTRAMUSCULAR AS NEEDED
Status: CANCELLED | OUTPATIENT
Start: 2023-04-11

## 2023-04-11 RX ORDER — TRISODIUM CITRATE DIHYDRATE AND CITRIC ACID MONOHYDRATE 500; 334 MG/5ML; MG/5ML
30 SOLUTION ORAL ONCE
Status: CANCELLED | OUTPATIENT
Start: 2023-04-11 | End: 2023-04-11

## 2023-04-11 RX ORDER — ACETAMINOPHEN 500 MG
1000 TABLET ORAL ONCE
Status: CANCELLED | OUTPATIENT
Start: 2023-04-11 | End: 2023-04-11

## 2023-04-11 RX ORDER — SODIUM CHLORIDE 0.9 % (FLUSH) 0.9 %
10 SYRINGE (ML) INJECTION AS NEEDED
Status: CANCELLED | OUTPATIENT
Start: 2023-04-11

## 2023-04-11 RX ORDER — LIDOCAINE HYDROCHLORIDE 10 MG/ML
5 INJECTION, SOLUTION EPIDURAL; INFILTRATION; INTRACAUDAL; PERINEURAL AS NEEDED
Status: CANCELLED | OUTPATIENT
Start: 2023-04-11

## 2023-04-11 RX ORDER — PROMETHAZINE HYDROCHLORIDE 12.5 MG/1
12.5 SUPPOSITORY RECTAL EVERY 6 HOURS PRN
Status: CANCELLED | OUTPATIENT
Start: 2023-04-11

## 2023-04-11 RX ORDER — CEFAZOLIN SODIUM 2 G/100ML
2 INJECTION, SOLUTION INTRAVENOUS ONCE
Status: CANCELLED | OUTPATIENT
Start: 2023-04-11 | End: 2023-04-11

## 2023-04-11 RX ORDER — MISOPROSTOL 200 UG/1
800 TABLET ORAL AS NEEDED
Status: CANCELLED | OUTPATIENT
Start: 2023-04-11

## 2023-04-11 RX ORDER — KETOROLAC TROMETHAMINE 30 MG/ML
30 INJECTION, SOLUTION INTRAMUSCULAR; INTRAVENOUS ONCE
Status: CANCELLED | OUTPATIENT
Start: 2023-04-11 | End: 2023-04-11

## 2023-04-11 RX ORDER — PROMETHAZINE HYDROCHLORIDE 12.5 MG/1
12.5 TABLET ORAL EVERY 6 HOURS PRN
Status: CANCELLED | OUTPATIENT
Start: 2023-04-11

## 2023-04-11 RX ORDER — SODIUM CHLORIDE, SODIUM LACTATE, POTASSIUM CHLORIDE, CALCIUM CHLORIDE 600; 310; 30; 20 MG/100ML; MG/100ML; MG/100ML; MG/100ML
125 INJECTION, SOLUTION INTRAVENOUS CONTINUOUS
Status: CANCELLED | OUTPATIENT
Start: 2023-04-11

## 2023-04-11 RX ORDER — SODIUM CHLORIDE 0.9 % (FLUSH) 0.9 %
10 SYRINGE (ML) INJECTION EVERY 12 HOURS SCHEDULED
Status: CANCELLED | OUTPATIENT
Start: 2023-04-11

## 2023-04-12 ENCOUNTER — HOSPITAL ENCOUNTER (INPATIENT)
Facility: HOSPITAL | Age: 33
LOS: 2 days | Discharge: HOME | End: 2023-04-14
Attending: OBSTETRICS & GYNECOLOGY | Admitting: OBSTETRICS & GYNECOLOGY
Payer: COMMERCIAL

## 2023-04-12 DIAGNOSIS — O34.219 HISTORY OF CESAREAN SECTION COMPLICATING PREGNANCY: ICD-10-CM

## 2023-04-12 DIAGNOSIS — Z34.83 PRENATAL CARE, SUBSEQUENT PREGNANCY IN THIRD TRIMESTER: ICD-10-CM

## 2023-04-12 DIAGNOSIS — O24.419 GESTATIONAL DIABETES MELLITUS, CLASS A2: ICD-10-CM

## 2023-04-12 DIAGNOSIS — O99.019 MATERNAL ANEMIA IN PREGNANCY, ANTEPARTUM: ICD-10-CM

## 2023-04-12 DIAGNOSIS — O09.93 HIGH-RISK PREGNANCY IN THIRD TRIMESTER: ICD-10-CM

## 2023-04-12 LAB
ABO GROUP BLD: NORMAL
BLD GP AB SCN SERPL QL: NEGATIVE
DEPRECATED RDW RBC AUTO: 42.6 FL (ref 37–54)
ERYTHROCYTE [DISTWIDTH] IN BLOOD BY AUTOMATED COUNT: 15 % (ref 12.3–15.4)
HCT VFR BLD AUTO: 32.2 % (ref 34–46.6)
HGB BLD-MCNC: 10.2 G/DL (ref 12–15.9)
MCH RBC QN AUTO: 25.1 PG (ref 26.6–33)
MCHC RBC AUTO-ENTMCNC: 31.7 G/DL (ref 31.5–35.7)
MCV RBC AUTO: 79.1 FL (ref 79–97)
PLATELET # BLD AUTO: 188 10*3/MM3 (ref 140–450)
PMV BLD AUTO: 13 FL (ref 6–12)
RBC # BLD AUTO: 4.07 10*6/MM3 (ref 3.77–5.28)
RH BLD: POSITIVE
T&S EXPIRATION DATE: NORMAL
WBC NRBC COR # BLD: 5.93 10*3/MM3 (ref 3.4–10.8)

## 2023-04-12 PROCEDURE — 85027 COMPLETE CBC AUTOMATED: CPT | Performed by: OBSTETRICS & GYNECOLOGY

## 2023-04-12 PROCEDURE — 0 MORPHINE PER 10 MG: Performed by: NURSE ANESTHETIST, CERTIFIED REGISTERED

## 2023-04-12 PROCEDURE — 25010000002 FENTANYL CITRATE (PF) 50 MCG/ML SOLUTION: Performed by: NURSE ANESTHETIST, CERTIFIED REGISTERED

## 2023-04-12 PROCEDURE — 25010000002 ONDANSETRON PER 1 MG: Performed by: OBSTETRICS & GYNECOLOGY

## 2023-04-12 PROCEDURE — 59025 FETAL NON-STRESS TEST: CPT

## 2023-04-12 PROCEDURE — 25010000002 MIDAZOLAM PER 1 MG: Performed by: NURSE ANESTHETIST, CERTIFIED REGISTERED

## 2023-04-12 PROCEDURE — 88307 TISSUE EXAM BY PATHOLOGIST: CPT | Performed by: OBSTETRICS & GYNECOLOGY

## 2023-04-12 PROCEDURE — 25010000002 ONDANSETRON PER 1 MG: Performed by: NURSE ANESTHETIST, CERTIFIED REGISTERED

## 2023-04-12 PROCEDURE — 86901 BLOOD TYPING SEROLOGIC RH(D): CPT | Performed by: OBSTETRICS & GYNECOLOGY

## 2023-04-12 PROCEDURE — 86850 RBC ANTIBODY SCREEN: CPT | Performed by: OBSTETRICS & GYNECOLOGY

## 2023-04-12 PROCEDURE — 25010000002 KETOROLAC TROMETHAMINE PER 15 MG: Performed by: OBSTETRICS & GYNECOLOGY

## 2023-04-12 PROCEDURE — 86900 BLOOD TYPING SEROLOGIC ABO: CPT | Performed by: OBSTETRICS & GYNECOLOGY

## 2023-04-12 PROCEDURE — 25010000002 KETOROLAC TROMETHAMINE PER 15 MG

## 2023-04-12 DEVICE — HEMOST ABS SURGICEL PWDR 3GM: Type: IMPLANTABLE DEVICE | Site: UTERUS | Status: FUNCTIONAL

## 2023-04-12 RX ORDER — CALCIUM CARBONATE 200(500)MG
1 TABLET,CHEWABLE ORAL EVERY 4 HOURS PRN
Status: DISCONTINUED | OUTPATIENT
Start: 2023-04-12 | End: 2023-04-14 | Stop reason: HOSPADM

## 2023-04-12 RX ORDER — KETOROLAC TROMETHAMINE 15 MG/ML
15 INJECTION, SOLUTION INTRAMUSCULAR; INTRAVENOUS EVERY 6 HOURS
Status: COMPLETED | OUTPATIENT
Start: 2023-04-12 | End: 2023-04-13

## 2023-04-12 RX ORDER — FENTANYL CITRATE 50 UG/ML
INJECTION, SOLUTION INTRAMUSCULAR; INTRAVENOUS AS NEEDED
Status: DISCONTINUED | OUTPATIENT
Start: 2023-04-12 | End: 2023-04-12 | Stop reason: SURG

## 2023-04-12 RX ORDER — FAMOTIDINE 10 MG/ML
INJECTION, SOLUTION INTRAVENOUS AS NEEDED
Status: DISCONTINUED | OUTPATIENT
Start: 2023-04-12 | End: 2023-04-12 | Stop reason: SURG

## 2023-04-12 RX ORDER — SODIUM CHLORIDE, SODIUM LACTATE, POTASSIUM CHLORIDE, CALCIUM CHLORIDE 600; 310; 30; 20 MG/100ML; MG/100ML; MG/100ML; MG/100ML
125 INJECTION, SOLUTION INTRAVENOUS CONTINUOUS
Status: DISCONTINUED | OUTPATIENT
Start: 2023-04-12 | End: 2023-04-13

## 2023-04-12 RX ORDER — DOCUSATE SODIUM 100 MG/1
100 CAPSULE, LIQUID FILLED ORAL 2 TIMES DAILY PRN
Status: DISCONTINUED | OUTPATIENT
Start: 2023-04-12 | End: 2023-04-14 | Stop reason: HOSPADM

## 2023-04-12 RX ORDER — ACETAMINOPHEN 500 MG
1000 TABLET ORAL EVERY 6 HOURS
Status: COMPLETED | OUTPATIENT
Start: 2023-04-12 | End: 2023-04-13

## 2023-04-12 RX ORDER — OXYTOCIN/0.9 % SODIUM CHLORIDE 30/500 ML
PLASTIC BAG, INJECTION (ML) INTRAVENOUS AS NEEDED
Status: DISCONTINUED | OUTPATIENT
Start: 2023-04-12 | End: 2023-04-12 | Stop reason: SURG

## 2023-04-12 RX ORDER — MIDAZOLAM HYDROCHLORIDE 1 MG/ML
INJECTION INTRAMUSCULAR; INTRAVENOUS AS NEEDED
Status: DISCONTINUED | OUTPATIENT
Start: 2023-04-12 | End: 2023-04-12 | Stop reason: SURG

## 2023-04-12 RX ORDER — OXYTOCIN/0.9 % SODIUM CHLORIDE 30/500 ML
250 PLASTIC BAG, INJECTION (ML) INTRAVENOUS CONTINUOUS
Status: ACTIVE | OUTPATIENT
Start: 2023-04-12 | End: 2023-04-12

## 2023-04-12 RX ORDER — NALOXONE HCL 0.4 MG/ML
0.4 VIAL (ML) INJECTION
Status: ACTIVE | OUTPATIENT
Start: 2023-04-12 | End: 2023-04-13

## 2023-04-12 RX ORDER — SODIUM CHLORIDE 0.9 % (FLUSH) 0.9 %
10 SYRINGE (ML) INJECTION EVERY 12 HOURS SCHEDULED
Status: DISCONTINUED | OUTPATIENT
Start: 2023-04-12 | End: 2023-04-12 | Stop reason: HOSPADM

## 2023-04-12 RX ORDER — IBUPROFEN 600 MG/1
600 TABLET ORAL EVERY 6 HOURS
Status: DISCONTINUED | OUTPATIENT
Start: 2023-04-13 | End: 2023-04-14 | Stop reason: HOSPADM

## 2023-04-12 RX ORDER — FENTANYL CITRATE 50 UG/ML
50 INJECTION, SOLUTION INTRAMUSCULAR; INTRAVENOUS
Status: DISCONTINUED | OUTPATIENT
Start: 2023-04-12 | End: 2023-04-12 | Stop reason: HOSPADM

## 2023-04-12 RX ORDER — MORPHINE SULFATE 0.5 MG/ML
INJECTION, SOLUTION EPIDURAL; INTRATHECAL; INTRAVENOUS AS NEEDED
Status: DISCONTINUED | OUTPATIENT
Start: 2023-04-12 | End: 2023-04-12 | Stop reason: SURG

## 2023-04-12 RX ORDER — DIPHENHYDRAMINE HCL 25 MG
25 CAPSULE ORAL EVERY 4 HOURS PRN
Status: DISCONTINUED | OUTPATIENT
Start: 2023-04-12 | End: 2023-04-14 | Stop reason: HOSPADM

## 2023-04-12 RX ORDER — ONDANSETRON 2 MG/ML
INJECTION INTRAMUSCULAR; INTRAVENOUS AS NEEDED
Status: DISCONTINUED | OUTPATIENT
Start: 2023-04-12 | End: 2023-04-12 | Stop reason: SURG

## 2023-04-12 RX ORDER — HYDROCORTISONE 25 MG/G
1 CREAM TOPICAL AS NEEDED
Status: DISCONTINUED | OUTPATIENT
Start: 2023-04-12 | End: 2023-04-14 | Stop reason: HOSPADM

## 2023-04-12 RX ORDER — METHYLERGONOVINE MALEATE 0.2 MG/ML
200 INJECTION INTRAVENOUS AS NEEDED
Status: DISCONTINUED | OUTPATIENT
Start: 2023-04-12 | End: 2023-04-14 | Stop reason: HOSPADM

## 2023-04-12 RX ORDER — CEFAZOLIN SODIUM 2 G/100ML
2 INJECTION, SOLUTION INTRAVENOUS ONCE
Status: COMPLETED | OUTPATIENT
Start: 2023-04-12 | End: 2023-04-12

## 2023-04-12 RX ORDER — DIPHENHYDRAMINE HYDROCHLORIDE 50 MG/ML
25 INJECTION INTRAMUSCULAR; INTRAVENOUS EVERY 4 HOURS PRN
Status: DISCONTINUED | OUTPATIENT
Start: 2023-04-12 | End: 2023-04-14 | Stop reason: HOSPADM

## 2023-04-12 RX ORDER — SIMETHICONE 80 MG
80 TABLET,CHEWABLE ORAL 4 TIMES DAILY PRN
Status: DISCONTINUED | OUTPATIENT
Start: 2023-04-12 | End: 2023-04-14 | Stop reason: HOSPADM

## 2023-04-12 RX ORDER — MISOPROSTOL 200 UG/1
600 TABLET ORAL AS NEEDED
Status: DISCONTINUED | OUTPATIENT
Start: 2023-04-12 | End: 2023-04-14 | Stop reason: HOSPADM

## 2023-04-12 RX ORDER — METHYLERGONOVINE MALEATE 0.2 MG/ML
200 INJECTION INTRAVENOUS AS NEEDED
Status: DISCONTINUED | OUTPATIENT
Start: 2023-04-12 | End: 2023-04-12 | Stop reason: HOSPADM

## 2023-04-12 RX ORDER — ACETAMINOPHEN 325 MG/1
650 TABLET ORAL EVERY 6 HOURS
Status: DISCONTINUED | OUTPATIENT
Start: 2023-04-13 | End: 2023-04-14 | Stop reason: HOSPADM

## 2023-04-12 RX ORDER — NALBUPHINE HCL 10 MG/ML
3 AMPUL (ML) INJECTION ONCE AS NEEDED
Status: DISCONTINUED | OUTPATIENT
Start: 2023-04-12 | End: 2023-04-12 | Stop reason: HOSPADM

## 2023-04-12 RX ORDER — MISOPROSTOL 200 UG/1
800 TABLET ORAL AS NEEDED
Status: DISCONTINUED | OUTPATIENT
Start: 2023-04-12 | End: 2023-04-12 | Stop reason: HOSPADM

## 2023-04-12 RX ORDER — BUPIVACAINE HYDROCHLORIDE 7.5 MG/ML
INJECTION, SOLUTION INTRASPINAL AS NEEDED
Status: DISCONTINUED | OUTPATIENT
Start: 2023-04-12 | End: 2023-04-12 | Stop reason: SURG

## 2023-04-12 RX ORDER — ONDANSETRON 2 MG/ML
4 INJECTION INTRAMUSCULAR; INTRAVENOUS ONCE AS NEEDED
Status: DISCONTINUED | OUTPATIENT
Start: 2023-04-12 | End: 2023-04-12 | Stop reason: HOSPADM

## 2023-04-12 RX ORDER — OXYCODONE HYDROCHLORIDE 5 MG/1
10 TABLET ORAL EVERY 4 HOURS PRN
Status: DISCONTINUED | OUTPATIENT
Start: 2023-04-12 | End: 2023-04-14 | Stop reason: HOSPADM

## 2023-04-12 RX ORDER — KETOROLAC TROMETHAMINE 30 MG/ML
30 INJECTION, SOLUTION INTRAMUSCULAR; INTRAVENOUS ONCE
Status: COMPLETED | OUTPATIENT
Start: 2023-04-12 | End: 2023-04-12

## 2023-04-12 RX ORDER — OXYTOCIN/0.9 % SODIUM CHLORIDE 30/500 ML
999 PLASTIC BAG, INJECTION (ML) INTRAVENOUS ONCE
Status: DISCONTINUED | OUTPATIENT
Start: 2023-04-12 | End: 2023-04-12 | Stop reason: HOSPADM

## 2023-04-12 RX ORDER — CARBOPROST TROMETHAMINE 250 UG/ML
250 INJECTION, SOLUTION INTRAMUSCULAR AS NEEDED
Status: DISCONTINUED | OUTPATIENT
Start: 2023-04-12 | End: 2023-04-12 | Stop reason: HOSPADM

## 2023-04-12 RX ORDER — ONDANSETRON 2 MG/ML
4 INJECTION INTRAMUSCULAR; INTRAVENOUS EVERY 6 HOURS PRN
Status: DISCONTINUED | OUTPATIENT
Start: 2023-04-12 | End: 2023-04-14 | Stop reason: HOSPADM

## 2023-04-12 RX ORDER — SODIUM CHLORIDE 0.9 % (FLUSH) 0.9 %
10 SYRINGE (ML) INJECTION AS NEEDED
Status: DISCONTINUED | OUTPATIENT
Start: 2023-04-12 | End: 2023-04-12 | Stop reason: HOSPADM

## 2023-04-12 RX ORDER — PRENATAL VIT/IRON FUM/FOLIC AC 27MG-0.8MG
1 TABLET ORAL DAILY
Status: DISCONTINUED | OUTPATIENT
Start: 2023-04-12 | End: 2023-04-14 | Stop reason: HOSPADM

## 2023-04-12 RX ORDER — OXYCODONE HYDROCHLORIDE 5 MG/1
5 TABLET ORAL EVERY 4 HOURS PRN
Status: DISCONTINUED | OUTPATIENT
Start: 2023-04-12 | End: 2023-04-14 | Stop reason: HOSPADM

## 2023-04-12 RX ORDER — LIDOCAINE HYDROCHLORIDE 10 MG/ML
5 INJECTION, SOLUTION EPIDURAL; INFILTRATION; INTRACAUDAL; PERINEURAL AS NEEDED
Status: DISCONTINUED | OUTPATIENT
Start: 2023-04-12 | End: 2023-04-12 | Stop reason: HOSPADM

## 2023-04-12 RX ORDER — GLYCOPYRROLATE 0.2 MG/ML
INJECTION INTRAMUSCULAR; INTRAVENOUS AS NEEDED
Status: DISCONTINUED | OUTPATIENT
Start: 2023-04-12 | End: 2023-04-12 | Stop reason: SURG

## 2023-04-12 RX ORDER — CARBOPROST TROMETHAMINE 250 UG/ML
250 INJECTION, SOLUTION INTRAMUSCULAR AS NEEDED
Status: DISCONTINUED | OUTPATIENT
Start: 2023-04-12 | End: 2023-04-14 | Stop reason: HOSPADM

## 2023-04-12 RX ORDER — ACETAMINOPHEN 500 MG
1000 TABLET ORAL EVERY 6 HOURS PRN
Status: ON HOLD | COMMUNITY
End: 2023-04-12

## 2023-04-12 RX ORDER — ALUMINA, MAGNESIA, AND SIMETHICONE 2400; 2400; 240 MG/30ML; MG/30ML; MG/30ML
15 SUSPENSION ORAL EVERY 4 HOURS PRN
Status: DISCONTINUED | OUTPATIENT
Start: 2023-04-12 | End: 2023-04-14 | Stop reason: HOSPADM

## 2023-04-12 RX ORDER — PROMETHAZINE HYDROCHLORIDE 12.5 MG/1
12.5 SUPPOSITORY RECTAL EVERY 6 HOURS PRN
Status: DISCONTINUED | OUTPATIENT
Start: 2023-04-12 | End: 2023-04-12 | Stop reason: HOSPADM

## 2023-04-12 RX ORDER — PROMETHAZINE HYDROCHLORIDE 12.5 MG/1
12.5 TABLET ORAL EVERY 6 HOURS PRN
Status: DISCONTINUED | OUTPATIENT
Start: 2023-04-12 | End: 2023-04-12 | Stop reason: HOSPADM

## 2023-04-12 RX ORDER — ACETAMINOPHEN 500 MG
1000 TABLET ORAL ONCE
Status: COMPLETED | OUTPATIENT
Start: 2023-04-12 | End: 2023-04-12

## 2023-04-12 RX ORDER — TRISODIUM CITRATE DIHYDRATE AND CITRIC ACID MONOHYDRATE 500; 334 MG/5ML; MG/5ML
30 SOLUTION ORAL ONCE
Status: COMPLETED | OUTPATIENT
Start: 2023-04-12 | End: 2023-04-12

## 2023-04-12 RX ADMIN — FENTANYL CITRATE 20 MCG: 0.05 INJECTION, SOLUTION INTRAMUSCULAR; INTRAVENOUS at 10:47

## 2023-04-12 RX ADMIN — GLYCOPYRROLATE 0.2 MG: 0.2 INJECTION INTRAMUSCULAR; INTRAVENOUS at 10:50

## 2023-04-12 RX ADMIN — ACETAMINOPHEN 1000 MG: 500 TABLET ORAL at 16:31

## 2023-04-12 RX ADMIN — Medication 2 G: at 10:24

## 2023-04-12 RX ADMIN — SODIUM CHLORIDE, POTASSIUM CHLORIDE, SODIUM LACTATE AND CALCIUM CHLORIDE: 600; 310; 30; 20 INJECTION, SOLUTION INTRAVENOUS at 11:16

## 2023-04-12 RX ADMIN — Medication 500 ML: at 11:15

## 2023-04-12 RX ADMIN — SODIUM CHLORIDE, POTASSIUM CHLORIDE, SODIUM LACTATE AND CALCIUM CHLORIDE 125 ML/HR: 600; 310; 30; 20 INJECTION, SOLUTION INTRAVENOUS at 08:55

## 2023-04-12 RX ADMIN — DOCUSATE SODIUM 100 MG: 100 CAPSULE, LIQUID FILLED ORAL at 22:01

## 2023-04-12 RX ADMIN — SODIUM CITRATE AND CITRIC ACID MONOHYDRATE 30 ML: 500; 334 SOLUTION ORAL at 10:24

## 2023-04-12 RX ADMIN — MIDAZOLAM 1 MG: 1 INJECTION INTRAMUSCULAR; INTRAVENOUS at 10:37

## 2023-04-12 RX ADMIN — FAMOTIDINE 20 MG: 10 INJECTION INTRAVENOUS at 10:38

## 2023-04-12 RX ADMIN — ONDANSETRON 4 MG: 2 INJECTION INTRAMUSCULAR; INTRAVENOUS at 15:31

## 2023-04-12 RX ADMIN — MIDAZOLAM 1 MG: 1 INJECTION INTRAMUSCULAR; INTRAVENOUS at 11:16

## 2023-04-12 RX ADMIN — KETOROLAC TROMETHAMINE 30 MG: 30 INJECTION, SOLUTION INTRAMUSCULAR; INTRAVENOUS at 12:28

## 2023-04-12 RX ADMIN — SIMETHICONE 80 MG: 80 TABLET, CHEWABLE ORAL at 22:01

## 2023-04-12 RX ADMIN — FENTANYL CITRATE 30 MCG: 50 INJECTION, SOLUTION INTRAMUSCULAR; INTRAVENOUS at 11:16

## 2023-04-12 RX ADMIN — BUPIVACAINE HYDROCHLORIDE IN DEXTROSE 1.6 ML: 7.5 INJECTION, SOLUTION SUBARACHNOID at 10:47

## 2023-04-12 RX ADMIN — SODIUM CHLORIDE, POTASSIUM CHLORIDE, SODIUM LACTATE AND CALCIUM CHLORIDE: 600; 310; 30; 20 INJECTION, SOLUTION INTRAVENOUS at 10:56

## 2023-04-12 RX ADMIN — FENTANYL CITRATE 50 MCG: 50 INJECTION, SOLUTION INTRAMUSCULAR; INTRAVENOUS at 11:34

## 2023-04-12 RX ADMIN — ACETAMINOPHEN 1000 MG: 500 TABLET ORAL at 09:50

## 2023-04-12 RX ADMIN — ONDANSETRON 4 MG: 2 INJECTION INTRAMUSCULAR; INTRAVENOUS at 10:38

## 2023-04-12 RX ADMIN — SODIUM CHLORIDE, POTASSIUM CHLORIDE, SODIUM LACTATE AND CALCIUM CHLORIDE 1500 ML: 600; 310; 30; 20 INJECTION, SOLUTION INTRAVENOUS at 10:27

## 2023-04-12 RX ADMIN — KETOROLAC TROMETHAMINE 15 MG: 15 INJECTION, SOLUTION INTRAMUSCULAR; INTRAVENOUS at 18:04

## 2023-04-12 RX ADMIN — MIDAZOLAM 2 MG: 1 INJECTION INTRAMUSCULAR; INTRAVENOUS at 11:37

## 2023-04-12 RX ADMIN — MORPHINE SULFATE 0.1 MG: 0.5 INJECTION, SOLUTION EPIDURAL; INTRATHECAL; INTRAVENOUS at 10:47

## 2023-04-12 RX ADMIN — ACETAMINOPHEN 1000 MG: 500 TABLET ORAL at 22:01

## 2023-04-12 NOTE — INTERVAL H&P NOTE
H&P for the current admission was reviewed. Of note, patient endorses LOF this morning (cannot designate time). Endorses taking half dose of PM insulin yesterday evening. Appropriately NPO. Plan to proceed to OR for repeat  section.      Tootie Brunner MD  2023

## 2023-04-12 NOTE — LACTATION NOTE
04/12/23 1502   Maternal Information   Date of Referral 04/12/23   Person Making Referral lactation consultant   Maternal Reason for Referral no prior breastfeeding experience  (pt states she didn't nurse her first child because she had too much stress.)   Maternal Assessment   Breast Size Issue none   Breast Shape Bilateral:;round   Breast Density Bilateral:;soft   Nipples Bilateral:;everted   Left Nipple Symptoms intact;nontender   Right Nipple Symptoms intact;nontender   Maternal Infant Feeding   Maternal Emotional State receptive;relaxed   Infant Positioning cross-cradle  (left)   Signs of Milk Transfer deep jaw excursions noted   Pain with Feeding no   Comfort Measures Before/During Feeding suction broken using finger;maternal position adjusted;latch adjusted;infant position adjusted   Latch Assistance minimal assistance   Support Person Involvement actively supporting mother   Milk Expression/Equipment   Breast Pump Type double electric, personal  (pt states she has an electric pump.)   Breast Pumping   Breast Pumping Interventions   (to pump for short or missed feedings.)

## 2023-04-12 NOTE — OP NOTE
PATRICIO Margot Nance  : 1990  MRN: [id number]  CSN: [id number]     Section Operative Note    Pre-Operative Dx:   1. Intrauterine pregnancy at 38w6d weeks   2. Previous  section - declines    3. GDMA2 suboptimally controlled   4. GBS carrier       Postoperative dx:    1. Intrauterine pregnancy at 38w6d weeks 2.   Same +   3. Large anterior adhesion to uterus        Procedure: Repeat  (LTCS)  - 1 layer closure and lysis of adhesions       Surgeon: Tootie Maher MD   Assistant: Tootie Brunner MD ( OB/GYN PGY1 resident)       Anesthesia: Spinal        EBL:  606 mls.   IV Fluids: 1900 mls.   UOP: 300 mls.     Antibiotics: cefazolin 2 gms     Infant:           Gender: male  infant    Weight: 3553 g (7 lb 13.3 oz)     Apgars: 8  @ 1 minute / 8  @ 5 minutes     Procedural findings: Bilateral fallopian tubes and ovaries were unable to be visualized as the uterus was not able to be exteriorized due to large anterior adhesion to the anterior uterus.  The bladder was backfilled with sterile milk and appeared without defects.    Procedure Details:   After the patient was adequately anesthetized, she was sterilely prepped and draped in the dorsal supine left lateral tilt position. A Pfannenstiel incision was created sharply with the knife. It was carried down to the fascia with the Bovie.  The fascia was cut transversely with the knife and extended in a curvilinear fashion with scissors. The fascia was freed from its midline insertion superiorly and inferiorly. Rectus muscles were  in the midline. It was apparent there was an adhesion of the peritoneum to the anterior uterus. Dissection was carefully performed in order to stay high above the bladder. The left rectus was taken down in order to have visualization. The peritoneum was sharply entered and a bladder flap was not sharply created. The lower uterine segment was scored transversely with the knife. Clear  amniotic fluid was seen. The infant's was in vertex presentation.  The head was delivered atraumatically. The mouth and nose were bulb suctioned.  The cord was clamped and the infant was handed to the delivery team which was in attendance.The placenta was spontaneously extracted. The uterus was unable to be exteriorized but was wiped free of debris and clot. There was still a significant adhesion to the anterior uterus superior to the hysterotomy that appeared to have some of the omentum involved potentially so this was left alone. The uterine incision was closed with #1 chromic in a continuous running locking fashion. The bladder flap was not reapproximated. Surgicel powder was liberally applied to the hysterotomy and also to the area where the adhesion had been taking down from the anterior uterus and areas were hemostatic. The bladder was backfilled with 200 ml of sterile milk and there were no areas of defects seen and this was then drained out.     The paracolic gutters were cleared of debris and clot. The fascia was closed with 0 PDS sewing in a running unlocked fashion. The subcutaneous tissue was copiously irrigated. Xochilt's fascia was closed with 3-0 plain gut and the skin was closed with 4-0 monocryl subcuticularly. Skin glue applied to the incision.  All counts were correct.         Complications:   None      Disposition:   Mother to Mother Baby/Postpartum  in stable condition currently.   Baby to NBN  in stable condition currently.       Tootie Maher MD  4/12/2023  12:01 EDT

## 2023-04-12 NOTE — H&P (VIEW-ONLY)
Aye Nance  : 1990  MRN: [id number]  CSN: [id number]    History and Physical  CC: scheduled repeat c/s  Subjective   Aye Nance is a 32 y.o. year old  with an Estimated Date of Delivery: 23 scheduled for  delivery due to previous C/S - declines .  Her placental location is posterior.  She is not planning for sterilization at the time of the .    Prenatal care has been with Dr. Maher.  It has been complicated by GDM - A2 (her diabetes has been sub-optimally controlled), GBS carrier and previous C/S - (desires repeat ).    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[facility], [place]      Past Medical History:   Diagnosis Date   • Gestational diabetes on insulin in third trimester      Past Surgical History:   Procedure Laterality Date   •  SECTION  2014   • WISDOM TOOTH EXTRACTION         Current Outpatient Medications:   •  docusate sodium (COLACE) 100 MG capsule, Take 1 capsule by mouth Daily As Needed., Disp: , Rfl:   •  ferrous sulfate 325 (65 FE) MG tablet, Take 1 tablet by mouth Daily With Breakfast., Disp: 30 tablet, Rfl: 11  •  glucose blood test strip, Please take your blood sugar fasting (goal <95) and 2 hours after each meal (goal <120) and record., Disp: 120 each, Rfl: 12  •  glucose monitor monitoring kit, 1 each As Needed (see below). Please check your sugar fasting (goal <95) and 2 hours after each meal (goal <120), Disp: 1 each, Rfl: 0  •  Insulin Glargine (BASAGLAR KWIKPEN) 100 UNIT/ML injection pen, Inject 14 Units under the skin  into the appropriate area as directed Every Night., Disp: 3 mL, Rfl: 1  •  Insulin Pen Needle 32G X 4 MM misc, 1 each Every Night., Disp: 30 each, Rfl: 1  •  Lancets (freestyle) lancets, Please take your blood sugar fasting (goal <95) and 2 hours after each meal (goal <120) and record., Disp: 120 each, Rfl: 12  •  Prenatal Vit-Fe Fumarate-FA (PRENATAL VITAMINS PO), Take  by mouth., Disp: , Rfl:   •  Probiotic Product (PROBIOTIC DAILY PO), Take  by mouth., Disp: , Rfl:     Allergies   Allergen Reactions   • Other Itching     Itching at injection site (Levamir)       Social History    Tobacco Use      Smoking status: Never      Smokeless tobacco: Never    Review of Systems   Constitutional: Negative for chills and fever.   HENT: Negative for congestion and sore throat.    Respiratory: Negative for shortness of breath and wheezing.    Cardiovascular: Negative for chest pain and palpitations.   Gastrointestinal: Negative for abdominal pain, nausea and vomiting.   Genitourinary: Negative for frequency, vaginal bleeding and vaginal pain.   Musculoskeletal: Negative for back pain and myalgias.   Neurological: Negative for dizziness, light-headedness and headaches.   Psychiatric/Behavioral: Negative for confusion. The patient is not nervous/anxious.          Objective   LMP 07/14/2022   General: well developed; well nourished  no acute distress   Heart: Not performed.   Lungs: breathing is unlabored   Abdomen: soft, non-tender; no masses  no umbilical or inguinal hernias are present  no hepato-splenomegaly     Prenatal Labs  Lab Results   Component Value Date    HGB 10.6 (L) 03/09/2023    RUBELLAABIGG 1.42 09/08/2022    HEPBSAG Non-Reactive 09/08/2022    ABSCRN Negative 09/08/2022    MGW2QFR7 Non-Reactive 09/08/2022    HEPCVIRUSABY Non-Reactive 09/08/2022     (H) 01/05/2023    GGTFASTING 98 (H) 01/19/2023    PZL0FIWW 184 (H) 01/19/2023    QYE4FCNY 169 (H) 01/19/2023    BIU7DGTX 112 01/19/2023    STREPGPB positive  2023    URINECX 25,000 CFU/mL Mixed Mera Isolated 2022    CHLAMNAA negative 2022    NGONORRHON negative 2022       Recent Labs  Lab Results   Component Value Date    HGB 10.6 (L) 2023    HCT 31.4 (L) 2023    WBC 6.83 2023     2023           Assessment   1. IUP with an Estimated Date of Delivery: 23  2. Planned  section for previous C/S - declines    3. GDMA2  4. GBS carrier      Plan   1. Repeat   2. ABx and DVT prophylaxis    Tootie Maher MD  2023

## 2023-04-12 NOTE — H&P
Aye Nance  : 1990  MRN: [id number]  CSN: [id number]    History and Physical  CC: scheduled repeat c/s  Subjective   Aye Nance is a 32 y.o. year old  with an Estimated Date of Delivery: 23 scheduled for  delivery due to previous C/S - declines .  Her placental location is posterior.  She is not planning for sterilization at the time of the .    Prenatal care has been with Dr. Maher.  It has been complicated by GDM - A2 (her diabetes has been sub-optimally controlled), GBS carrier and previous C/S - (desires repeat ).    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[facility]      Past Medical History:   Diagnosis Date   • Gestational diabetes on insulin in third trimester      Past Surgical History:   Procedure Laterality Date   •  SECTION  2014   • WISDOM TOOTH EXTRACTION         Current Outpatient Medications:   •  docusate sodium (COLACE) 100 MG capsule, Take 1 capsule by mouth Daily As Needed., Disp: , Rfl:   •  ferrous sulfate 325 (65 FE) MG tablet, Take 1 tablet by mouth Daily With Breakfast., Disp: 30 tablet, Rfl: 11  •  glucose blood test strip, Please take your blood sugar fasting (goal <95) and 2 hours after each meal (goal <120) and record., Disp: 120 each, Rfl: 12  •  glucose monitor monitoring kit, 1 each As Needed (see below). Please check your sugar fasting (goal <95) and 2 hours after each meal (goal <120), Disp: 1 each, Rfl: 0  •  Insulin Glargine (BASAGLAR KWIKPEN) 100 UNIT/ML injection pen, Inject 14 Units under the skin  into the appropriate area as directed Every Night., Disp: 3 mL, Rfl: 1  •  Insulin Pen Needle 32G X 4 MM misc, 1 each Every Night., Disp: 30 each, Rfl: 1  •  Lancets (freestyle) lancets, Please take your blood sugar fasting (goal <95) and 2 hours after each meal (goal <120) and record., Disp: 120 each, Rfl: 12  •  Prenatal Vit-Fe Fumarate-FA (PRENATAL VITAMINS PO), Take  by mouth., Disp: , Rfl:   •  Probiotic Product (PROBIOTIC DAILY PO), Take  by mouth., Disp: , Rfl:     Allergies   Allergen Reactions   • Other Itching     Itching at injection site (Levamir)       Social History    Tobacco Use      Smoking status: Never      Smokeless tobacco: Never    Review of Systems   Constitutional: Negative for chills and fever.   HENT: Negative for congestion and sore throat.    Respiratory: Negative for shortness of breath and wheezing.    Cardiovascular: Negative for chest pain and palpitations.   Gastrointestinal: Negative for abdominal pain, nausea and vomiting.   Genitourinary: Negative for frequency, vaginal bleeding and vaginal pain.   Musculoskeletal: Negative for back pain and myalgias.   Neurological: Negative for dizziness, light-headedness and headaches.   Psychiatric/Behavioral: Negative for confusion. The patient is not nervous/anxious.          Objective   LMP 07/14/2022   General: well developed; well nourished  no acute distress   Heart: Not performed.   Lungs: breathing is unlabored   Abdomen: soft, non-tender; no masses  no umbilical or inguinal hernias are present  no hepato-splenomegaly     Prenatal Labs  Lab Results   Component Value Date    HGB 10.6 (L) 03/09/2023    RUBELLAABIGG 1.42 09/08/2022    HEPBSAG Non-Reactive 09/08/2022    ABSCRN Negative 09/08/2022    UPM6VUF7 Non-Reactive 09/08/2022    HEPCVIRUSABY Non-Reactive 09/08/2022     (H) 01/05/2023    GGTFASTING 98 (H) 01/19/2023    QSI0ENBS 184 (H) 01/19/2023    BTR3GTAS 169 (H) 01/19/2023    SIB6AHBZ 112 01/19/2023    STREPGPB positive  2023    URINECX 25,000 CFU/mL Mixed Mera Isolated 2022    CHLAMNAA negative 2022    NGONORRHON negative 2022       Recent Labs  Lab Results   Component Value Date    HGB 10.6 (L) 2023    HCT 31.4 (L) 2023    WBC 6.83 2023     2023           Assessment   1. IUP with an Estimated Date of Delivery: 23  2. Planned  section for previous C/S - declines    3. GDMA2  4. GBS carrier      Plan   1. Repeat   2. ABx and DVT prophylaxis    Tootie Maher MD  2023

## 2023-04-12 NOTE — ANESTHESIA PREPROCEDURE EVALUATION
Anesthesia Evaluation     Patient summary reviewed and Nursing notes reviewed   NPO Solid Status: > 8 hours  NPO Liquid Status: > 8 hours           Airway   Dental      Pulmonary - negative pulmonary ROS   Cardiovascular - negative cardio ROS        Neuro/Psych- negative ROS  GI/Hepatic/Renal/Endo    (+) obesity,   diabetes mellitus type 2 using insulin,     Musculoskeletal (-) negative ROS    Abdominal    Substance History - negative use     OB/GYN    (+) Pregnant,         Other                        Anesthesia Plan    ASA 2     ITN and spinal       Anesthetic plan, risks, benefits, and alternatives have been provided, discussed and informed consent has been obtained with: patient.        CODE STATUS:    Level Of Support Discussed With: Patient  Code Status (Patient has no pulse and is not breathing): CPR (Attempt to Resuscitate)  Medical Interventions (Patient has pulse or is breathing): Full Support  Release to patient: Routine Release

## 2023-04-12 NOTE — ANESTHESIA PROCEDURE NOTES
Spinal Block      Patient reassessed immediately prior to procedure    Indication:procedure for pain  Performed By  CRNA/CAA: Lola Davis CRNA  Preanesthetic Checklist  Completed: patient identified, IV checked, risks and benefits discussed, surgical consent, monitors and equipment checked, pre-op evaluation and timeout performed  Spinal Block Prep:  Patient Position:sitting  Sterile Tech:cap, gloves, mask and sterile barriers  Prep:Betadine  Patient Monitoring:blood pressure monitoring, continuous pulse oximetry and EKG    Spinal Block Procedure  Approach:midline  Guidance:palpation technique  Location:L3-L4  Needle Type:Pencan  Needle Gauge:25 G  Placement of Spinal needle event:cerebrospinal fluid aspirated  Paresthesia: no  Fluid Appearance:clear     Post Assessment  Patient Tolerance:patient tolerated the procedure well with no apparent complications  Complications no

## 2023-04-13 LAB
BASOPHILS # BLD AUTO: 0.01 10*3/MM3 (ref 0–0.2)
BASOPHILS NFR BLD AUTO: 0.1 % (ref 0–1.5)
DEPRECATED RDW RBC AUTO: 42.6 FL (ref 37–54)
EOSINOPHIL # BLD AUTO: 0.01 10*3/MM3 (ref 0–0.4)
EOSINOPHIL NFR BLD AUTO: 0.1 % (ref 0.3–6.2)
ERYTHROCYTE [DISTWIDTH] IN BLOOD BY AUTOMATED COUNT: 14.7 % (ref 12.3–15.4)
HCT VFR BLD AUTO: 31.2 % (ref 34–46.6)
HGB BLD-MCNC: 9.8 G/DL (ref 12–15.9)
IMM GRANULOCYTES # BLD AUTO: 0.04 10*3/MM3 (ref 0–0.05)
IMM GRANULOCYTES NFR BLD AUTO: 0.5 % (ref 0–0.5)
LYMPHOCYTES # BLD AUTO: 1.8 10*3/MM3 (ref 0.7–3.1)
LYMPHOCYTES NFR BLD AUTO: 24.4 % (ref 19.6–45.3)
MCH RBC QN AUTO: 25.3 PG (ref 26.6–33)
MCHC RBC AUTO-ENTMCNC: 31.4 G/DL (ref 31.5–35.7)
MCV RBC AUTO: 80.6 FL (ref 79–97)
MONOCYTES # BLD AUTO: 0.73 10*3/MM3 (ref 0.1–0.9)
MONOCYTES NFR BLD AUTO: 9.9 % (ref 5–12)
NEUTROPHILS NFR BLD AUTO: 4.8 10*3/MM3 (ref 1.7–7)
NEUTROPHILS NFR BLD AUTO: 65 % (ref 42.7–76)
NRBC BLD AUTO-RTO: 0 /100 WBC (ref 0–0.2)
PLATELET # BLD AUTO: 182 10*3/MM3 (ref 140–450)
PMV BLD AUTO: 12.1 FL (ref 6–12)
RBC # BLD AUTO: 3.87 10*6/MM3 (ref 3.77–5.28)
WBC NRBC COR # BLD: 7.39 10*3/MM3 (ref 3.4–10.8)

## 2023-04-13 PROCEDURE — 25010000002 KETOROLAC TROMETHAMINE PER 15 MG

## 2023-04-13 PROCEDURE — 85025 COMPLETE CBC W/AUTO DIFF WBC: CPT

## 2023-04-13 RX ADMIN — KETOROLAC TROMETHAMINE 15 MG: 15 INJECTION, SOLUTION INTRAMUSCULAR; INTRAVENOUS at 06:44

## 2023-04-13 RX ADMIN — KETOROLAC TROMETHAMINE 15 MG: 15 INJECTION, SOLUTION INTRAMUSCULAR; INTRAVENOUS at 12:42

## 2023-04-13 RX ADMIN — DOCUSATE SODIUM 100 MG: 100 CAPSULE, LIQUID FILLED ORAL at 08:57

## 2023-04-13 RX ADMIN — SIMETHICONE 80 MG: 80 TABLET, CHEWABLE ORAL at 12:42

## 2023-04-13 RX ADMIN — ACETAMINOPHEN 325MG 650 MG: 325 TABLET ORAL at 21:47

## 2023-04-13 RX ADMIN — SIMETHICONE 80 MG: 80 TABLET, CHEWABLE ORAL at 08:57

## 2023-04-13 RX ADMIN — ACETAMINOPHEN 1000 MG: 500 TABLET ORAL at 10:47

## 2023-04-13 RX ADMIN — ACETAMINOPHEN 1000 MG: 500 TABLET ORAL at 04:25

## 2023-04-13 RX ADMIN — KETOROLAC TROMETHAMINE 15 MG: 15 INJECTION, SOLUTION INTRAMUSCULAR; INTRAVENOUS at 01:11

## 2023-04-13 RX ADMIN — PRENATAL VITAMINS-IRON FUMARATE 27 MG IRON-FOLIC ACID 0.8 MG TABLET 1 TABLET: at 08:57

## 2023-04-13 RX ADMIN — ACETAMINOPHEN 325MG 650 MG: 325 TABLET ORAL at 16:27

## 2023-04-13 RX ADMIN — IBUPROFEN 600 MG: 600 TABLET, FILM COATED ORAL at 18:47

## 2023-04-13 NOTE — ANESTHESIA POSTPROCEDURE EVALUATION
Patient: Aye Nance    Procedure Summary     Date: 23 Room / Location: [facility] LABOR DELIVERY   [facility]    Anesthesia Start: 1036 Anesthesia Stop: 1158    Procedure:  SECTION REPEAT (Abdomen) Diagnosis:       Prenatal care, subsequent pregnancy in third trimester      Maternal anemia in pregnancy, antepartum      History of  section complicating pregnancy      (Prenatal care, subsequent pregnancy in third trimester [Z34.83])      (Maternal anemia in pregnancy, antepartum [O99.019])      (History of  section complicating pregnancy [O34.219])    Surgeons: Tootie Maher MD Provider: Preet Martínez DO    Anesthesia Type: ITN, spinal ASA Status: 2          Anesthesia Type: ITN, spinal    Vitals  Vitals Value Taken Time   /67 23 1155   Temp 97.4    Pulse 75 23 1159   Resp 18    SpO2 98 % 23 1159   Vitals shown include unvalidated device data.        Post Anesthesia Care and Evaluation    Patient location during evaluation: bedside  Patient participation: complete - patient participated  Level of consciousness: awake and alert  Pain score: 0  Pain management: adequate    Airway patency: patent  Anesthetic complications: No anesthetic complications    Cardiovascular status: acceptable  Respiratory status: acceptable  Hydration status: acceptable      
Patient: Aye Nance    Procedure Summary     Date: 23 Room / Location: [facility] LABOR DELIVERY   [facility]    Anesthesia Start: 1036 Anesthesia Stop: 1158    Procedure:  SECTION REPEAT (Abdomen) Diagnosis:       Prenatal care, subsequent pregnancy in third trimester      Maternal anemia in pregnancy, antepartum      History of  section complicating pregnancy      (Prenatal care, subsequent pregnancy in third trimester [Z34.83])      (Maternal anemia in pregnancy, antepartum [O99.019])      (History of  section complicating pregnancy [O34.219])    Surgeons: Tootie Maher MD Provider: Preet Martínez DO    Anesthesia Type: ITN, spinal ASA Status: 2          Anesthesia Type: ITN, spinal    Vitals  Vitals Value Taken Time   /57 23 0806   Temp 98 °F (36.7 °C) 23 0806   Pulse 73 23 0806   Resp 18 23 0806   SpO2 97 % 23 1404   Vitals shown include unvalidated device data.        Post Anesthesia Care and Evaluation    Patient location during evaluation: bedside  Patient participation: complete - patient participated  Level of consciousness: awake and alert  Pain management: adequate    Airway patency: patent  Anesthetic complications: No anesthetic complications    Cardiovascular status: acceptable  Respiratory status: acceptable  Hydration status: acceptable  Post Neuraxial Block status: Motor and sensory function returned to baseline and No signs or symptoms of PDPH    
No

## 2023-04-13 NOTE — PROGRESS NOTES
PATRICIO Frost  Aye Nance  : 1990  MRN: [id number]  CSN: [id number]    Post-operative Day #1  CC: routine post operative care   Subjective   Her pain is well controlled. Vaginal bleeding is normal in amount. She is ambulating without difficulty. She has not passed gas since surgery. Her baby is doing well. Her randall was just removed and hse has not yet voided.      Objective     Min/max vitals past 24 hours:   Temp  Min: 97.4 °F (36.3 °C)  Max: 98.6 °F (37 °C)  BP  Min: 86/50  Max: 123/79  Pulse  Min: 53  Max: 84  Resp  Min: 14  Max: 20        General: well developed; well nourished  no acute distress   Abdomen: soft, non-tender; no masses  no umbilical or inguinal hernias are present  no hepato-splenomegaly  incision is clean, dry, intact and without drainage   Pelvic: Not performed   Ext: Calves NT     Last 3 values   Results from last 7 days   Lab Units 23  0701 23  0907   WBC 10*3/mm3 7.39 5.93   HEMOGLOBIN g/dL 9.8* 10.2*   HEMATOCRIT % 31.2* 32.2*   PLATELETS 10*3/mm3 182 188     Lab Results   Component Value Date    RH Positive 2023    HEPBSAG Non-Reactive 2022          Assessment   1. POD #1 S/P Repeat  (LTCS)  - 1 layer closure  2. Doing well   3. Postpartum anemia     Plan   1. Continue routine post-operative care   Circumcision was discussed.  It was explained to Aye that the procedure is elective.  There are probably no long-term medical benefits for circumcision.  Studies have suggested in the first year of life, there may be a reduction in urinary tract infections in a circumcised male's.  This difference disappears after the first year of life.  Some have suggested that circumcision reduces nerve endings and the tip of the penis which could have an impact to him as he ages.  There are small risks of the procedure including taking off to much or too little skin from the foreskin.  He will be anesthetized and most of the time this is successful.   It cannot be guaranteed that the child will feel no pain.      Tootie Maher MD  4/13/2023  08:35 EDT

## 2023-04-14 VITALS
OXYGEN SATURATION: 99 % | HEART RATE: 71 BPM | BODY MASS INDEX: 34.49 KG/M2 | SYSTOLIC BLOOD PRESSURE: 105 MMHG | HEIGHT: 64 IN | RESPIRATION RATE: 16 BRPM | DIASTOLIC BLOOD PRESSURE: 51 MMHG | TEMPERATURE: 98.6 F | WEIGHT: 202 LBS

## 2023-04-14 RX ORDER — FERROUS SULFATE 325(65) MG
325 TABLET ORAL EVERY OTHER DAY
Qty: 45 TABLET | Refills: 0 | Status: SHIPPED | OUTPATIENT
Start: 2023-04-14 | End: 2023-07-13

## 2023-04-14 RX ORDER — OXYCODONE HYDROCHLORIDE 5 MG/1
5 TABLET ORAL EVERY 6 HOURS PRN
Qty: 15 TABLET | Refills: 0 | Status: SHIPPED | OUTPATIENT
Start: 2023-04-14 | End: 2023-04-19

## 2023-04-14 RX ORDER — IBUPROFEN 600 MG/1
600 TABLET ORAL EVERY 6 HOURS PRN
Qty: 60 TABLET | Refills: 1 | Status: SHIPPED | OUTPATIENT
Start: 2023-04-14

## 2023-04-14 RX ORDER — DOCUSATE SODIUM 100 MG/1
100 CAPSULE, LIQUID FILLED ORAL 2 TIMES DAILY PRN
Qty: 60 CAPSULE | Refills: 1 | Status: SHIPPED | OUTPATIENT
Start: 2023-04-14

## 2023-04-14 RX ADMIN — ACETAMINOPHEN 325MG 650 MG: 325 TABLET ORAL at 03:53

## 2023-04-14 RX ADMIN — IBUPROFEN 600 MG: 600 TABLET, FILM COATED ORAL at 00:50

## 2023-04-14 RX ADMIN — IBUPROFEN 600 MG: 600 TABLET, FILM COATED ORAL at 11:57

## 2023-04-14 RX ADMIN — PRENATAL VITAMINS-IRON FUMARATE 27 MG IRON-FOLIC ACID 0.8 MG TABLET 1 TABLET: at 09:21

## 2023-04-14 RX ADMIN — IBUPROFEN 600 MG: 600 TABLET, FILM COATED ORAL at 06:39

## 2023-04-14 RX ADMIN — ACETAMINOPHEN 325MG 650 MG: 325 TABLET ORAL at 09:21

## 2023-04-14 NOTE — PROGRESS NOTES
Margot Nance  : 1990  MRN: [id number]  CSN: [id number]    Post-operative Day #2  CC: routine postpartum care   Subjective   Her pain is well controlled. Vaginal bleeding is normal in amount. She is ambulating without difficulty. She is passing gas. Her bowels are working normally. She is voiding without difficulty. Her baby is doing well. She may want to go home later today.      Objective     Min/max vitals past 24 hours:   Temp  Min: 97.4 °F (36.3 °C)  Max: 98 °F (36.7 °C)  BP  Min: 100/58  Max: 111/60  Pulse  Min: 66  Max: 80  Resp  Min: 16  Max: 18        General: well developed; well nourished  no acute distress   Abdomen: soft, non-tender; no masses  no umbilical or inguinal hernias are present  no hepato-splenomegaly  incision is clean, dry, intact and without drainage   Pelvic: Not performed   Ext: Calves NT     Results from last 7 days   Lab Units 23  0701 23  0907   WBC 10*3/mm3 7.39 5.93   HEMOGLOBIN g/dL 9.8* 10.2*   HEMATOCRIT % 31.2* 32.2*   PLATELETS 10*3/mm3 182 188     Lab Results   Component Value Date    RH Positive 2023    HEPBSAG Non-Reactive 2022        Assessment   1. POD #2 S/P Primary  (LTCS)  - 1 layer closure   2. Doing well  3. Postpartum anemia     Plan   1. Continue routine post-operative care  2. Anticipate discharge to home today versus tomorrow, 2 week follow up    Tootie Maher MD  2023  07:29 EDT

## 2023-04-16 NOTE — DISCHARGE SUMMARY
Discharge Summary     Margot Nance  : 1990  MRN: [id number]  CSN: [id number]    Date of Admission: 2023   Date of Discharge:  2023   Delivering Physician: Tootie Maher MD       Admission Diagnosis: 1. Pregnancy at 38w6d  2. GDMA2     Discharge Diagnosis: 1. Same as above plus  2. Pregnancy at 38w6d - delivered  3. Previous  section - declines    4. Postpartum anemia        Procedures: 1. Repeat  (LTCS)  - 1 layer closure     Hospital Course  See the completed operative report for details regarding her delivery.    Her post-operative course was unremarkable.  On POD # 2 she felt like she ready ready for D/C.  She was evaluated by Dr. Maher who agreed she was able to be discharged to home.  She had no febrile morbidity. She had normal bowel and bladder function and was hemodynamically stable.  Her wound was healing well without obvious signs of infections.    Infant  male  fetus weighing 3553 g (7 lb 13.3 oz)   Apgars -  8 @ 1 minute /  8 @ 5 minutes.    Discharge labs  Lab Results   Component Value Date    WBC 7.39 2023    HGB 9.8 (L) 2023    HCT 31.2 (L) 2023     2023       Discharge Medications     Discharge Medications      New Medications      Instructions Start Date   docusate sodium 100 MG capsule  Commonly known as: COLACE   100 mg, Oral, 2 Times Daily PRN      ferrous sulfate 325 (65 FE) MG tablet   325 mg, Oral, Every Other Day      ibuprofen 600 MG tablet  Commonly known as: ADVIL,MOTRIN   600 mg, Oral, Every 6 Hours PRN      oxyCODONE 5 MG immediate release tablet  Commonly known as: ROXICODONE   5 mg, Oral, Every 6 Hours PRN         Continue These Medications      Instructions Start Date   freestyle lancets   Please take your blood sugar fasting (goal <95) and 2 hours after each meal (goal <120) and record.      glucose monitor monitoring kit   1 each, Does not apply, As Needed, Please check your sugar  fasting (goal <95) and 2 hours after each meal (goal <120)      Insulin Pen Needle 32G X 4 MM misc   1 each, Does not apply, Nightly             Discharge Disposition Home or Self Care   Condition on Discharge: good   Follow-up: 2 weeks with Dr. Nika Maher MD  4/16/2023

## 2023-04-17 LAB
CYTO UR: NORMAL
LAB AP CASE REPORT: NORMAL
LAB AP CLINICAL INFORMATION: NORMAL
PATH REPORT.FINAL DX SPEC: NORMAL
PATH REPORT.GROSS SPEC: NORMAL

## 2023-05-01 ENCOUNTER — POSTPARTUM VISIT (OUTPATIENT)
Dept: OBSTETRICS AND GYNECOLOGY | Facility: CLINIC | Age: 33
End: 2023-05-01
Payer: COMMERCIAL

## 2023-05-01 VITALS — DIASTOLIC BLOOD PRESSURE: 80 MMHG | SYSTOLIC BLOOD PRESSURE: 120 MMHG | WEIGHT: 186 LBS | BODY MASS INDEX: 31.93 KG/M2

## 2023-05-01 NOTE — PROGRESS NOTES
Subjective   Chief Complaint   Patient presents with   • Postpartum Care     2w5d [facility]     Aye Nance is a 32 y.o. year old  presenting to be seen for her postpartum visit.  She had a Repeat  (LTCS).  Her son is doing well.    Since delivery she has not been sexually active.  She does not have concerns about post-partum blues/depression.   [place] Score = 0  She is bottle feeding.  For ongoing contraception, her plans are undecided.    The following portions of the patient's history were reviewed and updated as appropriate:current medications and allergies    Social History    Tobacco Use      Smoking status: Never      Smokeless tobacco: Never      Review of Systems  Constitutional POS: nothing reported    NEG: anorexia or night sweats   Genitourinary POS: nothing reported    NEG: dysuria or hematuria      Gastointestinal POS: nothing reported    NEG: bloating, change in bowel habits, melena or reflux symptoms   Breast POS: nothing reported    NEG: persistent breast lump, skin dimpling or nipple discharge        Objective   /80 (BP Location: Right arm, Patient Position: Sitting, Cuff Size: Adult)   Wt 84.4 kg (186 lb)   Breastfeeding No   BMI 31.93 kg/m²     General: well developed; well nourished  no acute distress   Skin: Not performed.   Thyroid: not examined   Heart:  Not performed.   Lungs: breathing is unlabored   Breasts:  Not performed.   Abdomen: soft, non-tender; no masses  no umbilical or inguinal hernias are present  no hepato-splenomegaly  incision is clean, dry, intact and without drainage   Pelvis: Not performed.        Assessment   1. Normal 3 week postpartum exam     Plan   1. BC options reviewed and compared today: declines discussion    2. Reviewed exercise postpartum  3. The importance of keeping all planned follow-up and taking all medications as prescribed was emphasized.  4. Follow up for postpartum visit in ~ 3-4 weeks    No orders of the defined types  were placed in this encounter.         This note was electronically signed.    Tootie Maher MD  May 1, 2023

## 2023-05-30 ENCOUNTER — POSTPARTUM VISIT (OUTPATIENT)
Dept: OBSTETRICS AND GYNECOLOGY | Facility: CLINIC | Age: 33
End: 2023-05-30

## 2023-05-30 VITALS — DIASTOLIC BLOOD PRESSURE: 84 MMHG | WEIGHT: 186 LBS | SYSTOLIC BLOOD PRESSURE: 120 MMHG | BODY MASS INDEX: 31.93 KG/M2

## 2023-05-30 PROBLEM — O99.820 GBS (GROUP B STREPTOCOCCUS CARRIER), +RV CULTURE, CURRENTLY PREGNANT: Status: RESOLVED | Noted: 2023-03-25 | Resolved: 2023-05-30

## 2023-05-30 PROBLEM — O34.219 HISTORY OF CESAREAN SECTION COMPLICATING PREGNANCY: Status: RESOLVED | Noted: 2022-09-08 | Resolved: 2023-05-30

## 2023-05-30 PROCEDURE — 0503F POSTPARTUM CARE VISIT: CPT | Performed by: OBSTETRICS & GYNECOLOGY

## 2023-05-30 NOTE — PROGRESS NOTES
Subjective   Chief Complaint   Patient presents with   • Postpartum Care     6w6d [facility]     Aye Nance is a 32 y.o. year old  presenting to be seen for her postpartum visit.  She had a Repeat  (LTCS).  Her son is doing well.    Since delivery she has not been sexually active.  She does not have concerns about post-partum blues/depression.   [place] Score = 0  She is bottle feeding.  For ongoing contraception, her plans are abstinence and condoms.    The following portions of the patient's history were reviewed and updated as appropriate:current medications and allergies    Social History    Tobacco Use      Smoking status: Never      Smokeless tobacco: Never      Review of Systems  Constitutional POS: nothing reported    NEG: anorexia or night sweats   Genitourinary POS: nothing reported    NEG: dysuria or hematuria      Gastointestinal POS: nothing reported    NEG: bloating, change in bowel habits, melena or reflux symptoms   Breast POS: nothing reported    NEG: persistent breast lump, skin dimpling or nipple discharge        Objective   /84 (BP Location: Right arm, Patient Position: Sitting, Cuff Size: Adult)   Wt 84.4 kg (186 lb)   LMP 2023 Comment: HAS BEEN GOING ON LONGER THAN NORMAL  Breastfeeding No   BMI 31.93 kg/m²     General: well developed; well nourished  no acute distress   Skin: No suspicious lesions seen   Thyroid: normal to inspection and palpation   Heart:  Not performed.   Lungs: breathing is unlabored   Breasts:  Examined in supine position  Symmetric without masses or skin dimpling  Nipples normal without inversion, lesions or discharge  There are no palpable axillary nodes   Abdomen: soft, non-tender; no masses  no umbilical or inguinal hernias are present  no hepato-splenomegaly  incision is clean, dry, intact and without drainage   Pelvis: Clinical staff was present for exam  External genitalia:  normal appearance of the external genitalia  including Bartholin's and [place]'s glands.  :  urethral meatus normal;  Vaginal:  normal pink mucosa without prolapse or lesions.  Cervix:  normal appearance.  Uterus:  normal size, shape and consistency. anteverted;  Adnexa:  normal bimanual exam of the adnexa.  Rectal:  digital rectal exam not performed; anus visually normal appearing.        Assessment   1. Normal 6 week postpartum exam  2. History of GDM     Plan   1. BC options reviewed and compared today: condoms   2. She knows to keep track of her menstrual cycles/bleeding in a flow raimundo and let me know if not regulated or improved in next ~ 1-2 months.  3. Pap was not done today.  I explained to Aye that the recommendations for Pap smear interval in a low risk patient has lengthened to 3 years time.  I told Aye she still needs to be seen in our office yearly for a full physical including breast and pelvic exam.  4. The importance of keeping all planned follow-up and taking all medications as prescribed was emphasized.  5. Follow up for annual exam in ~ one year    No orders of the defined types were placed in this encounter.         This note was electronically signed.    Tootie Maher MD  May 30, 2023

## 2023-05-31 DIAGNOSIS — Z86.32 HISTORY OF GESTATIONAL DIABETES: Primary | ICD-10-CM

## 2023-11-01 ENCOUNTER — LAB (OUTPATIENT)
Dept: LAB | Facility: HOSPITAL | Age: 33
End: 2023-11-01
Payer: COMMERCIAL

## 2023-11-01 DIAGNOSIS — Z86.32 HISTORY OF GESTATIONAL DIABETES: Primary | ICD-10-CM

## 2023-11-01 LAB
GLUCOSE P FAST SERPL-MCNC: 107 MG/DL (ref 65–94)
GTT GEST 2H PNL UR+SERPL: 205 MG/DL (ref 65–179)
GTT GEST 3H PNL SERPL: 148 MG/DL (ref 65–154)

## 2023-11-01 PROCEDURE — 82962 GLUCOSE BLOOD TEST: CPT

## 2023-11-01 PROCEDURE — 36415 COLL VENOUS BLD VENIPUNCTURE: CPT

## 2023-11-01 PROCEDURE — 82951 GLUCOSE TOLERANCE TEST (GTT): CPT

## 2023-11-02 ENCOUNTER — TELEPHONE (OUTPATIENT)
Dept: OBSTETRICS AND GYNECOLOGY | Facility: CLINIC | Age: 33
End: 2023-11-02
Payer: COMMERCIAL

## 2023-11-02 DIAGNOSIS — R73.09 ABNORMAL GLUCOSE TOLERANCE TEST (GTT): Primary | ICD-10-CM

## 2023-11-07 ENCOUNTER — TRANSCRIBE ORDERS (OUTPATIENT)
Dept: ADMINISTRATIVE | Facility: HOSPITAL | Age: 33
End: 2023-11-07
Payer: COMMERCIAL

## 2023-11-07 DIAGNOSIS — R51.9 NONINTRACTABLE HEADACHE, UNSPECIFIED CHRONICITY PATTERN, UNSPECIFIED HEADACHE TYPE: Primary | ICD-10-CM

## 2023-11-08 ENCOUNTER — TELEPHONE (OUTPATIENT)
Dept: OBSTETRICS AND GYNECOLOGY | Facility: CLINIC | Age: 33
End: 2023-11-08
Payer: COMMERCIAL

## 2023-11-08 NOTE — TELEPHONE ENCOUNTER
I had placed a referral to endocrinology based on patient's 2 hour glucose test and I got notice they called to schedule and patient did not want to or did not think it was indicated.     Can we follow up what her plans are?  If she is not going to see endocrinology then she needs to at least address this with her PCP.    Thanks, Tootie Maher MD

## 2023-12-09 ENCOUNTER — HOSPITAL ENCOUNTER (OUTPATIENT)
Dept: CT IMAGING | Facility: HOSPITAL | Age: 33
Discharge: HOME | End: 2023-12-09
Admitting: FAMILY MEDICINE
Payer: COMMERCIAL

## 2023-12-09 DIAGNOSIS — R51.9 NONINTRACTABLE HEADACHE, UNSPECIFIED CHRONICITY PATTERN, UNSPECIFIED HEADACHE TYPE: ICD-10-CM

## 2023-12-09 PROCEDURE — 70450 CT HEAD/BRAIN W/O DYE: CPT

## 2024-01-11 ENCOUNTER — OFFICE VISIT (OUTPATIENT)
Dept: OBSTETRICS AND GYNECOLOGY | Facility: CLINIC | Age: 34
End: 2024-01-11
Payer: COMMERCIAL

## 2024-01-11 ENCOUNTER — LAB (OUTPATIENT)
Dept: LAB | Facility: HOSPITAL | Age: 34
End: 2024-01-11
Payer: COMMERCIAL

## 2024-01-11 VITALS
RESPIRATION RATE: 16 BRPM | DIASTOLIC BLOOD PRESSURE: 80 MMHG | WEIGHT: 193 LBS | SYSTOLIC BLOOD PRESSURE: 118 MMHG | BODY MASS INDEX: 33.13 KG/M2

## 2024-01-11 DIAGNOSIS — N93.9 ABNORMAL UTERINE BLEEDING (AUB): Primary | ICD-10-CM

## 2024-01-11 DIAGNOSIS — N93.9 ABNORMAL UTERINE BLEEDING (AUB): ICD-10-CM

## 2024-01-11 PROCEDURE — 36415 COLL VENOUS BLD VENIPUNCTURE: CPT

## 2024-01-11 PROCEDURE — 84146 ASSAY OF PROLACTIN: CPT

## 2024-01-11 PROCEDURE — 83001 ASSAY OF GONADOTROPIN (FSH): CPT

## 2024-01-11 PROCEDURE — 83002 ASSAY OF GONADOTROPIN (LH): CPT

## 2024-01-11 NOTE — PROGRESS NOTES
Subjective   Chief Complaint   Patient presents with    Vaginal Bleeding     Brown discharge on Tuesday, clots on wednesday     Aye Nance is a 33 y.o. year old .  Patient's last menstrual period was 2023.  She presents to be seen because of abnormal uterine bleeding following her last menstrual cycle. She was approx 1 1/2 weeks post menstrual when she had brown discharge on Tuesday with some dark brown clots. This lasted through Wednesday, and then today it has slowed down and she has no clots. She wore a pad for all of this. She also endorses mild cramping similar to menstrual cramps. She has a 9 month old, but did not breastfeed. She is not currently on contraception, as it took 5 years to conceive her son. She did not have a fertility workup, and she was able to spontaneously conceive.    Her last period began on . She states that period was normal for her, lasting 7 days and her typical flow. She also had her usual menstrual cramps at that time. She denies ever experiencing this before, and to her knowledge she has no known issues with her thyroid.     OTHER THINGS SHE WANTS TO DISCUSS TODAY:  Nothing else    The following portions of the patient's history were reviewed and updated as appropriate:current medications, allergies, and past medical history    Social History    Tobacco Use      Smoking status: Never      Smokeless tobacco: Never      Review of Systems   Constitutional: Negative.    Gastrointestinal: Negative.    Endocrine: Negative.    Genitourinary:  Positive for vaginal bleeding and vaginal discharge. Negative for dyspareunia, dysuria, pelvic pain and vaginal pain.           Objective   /80   Resp 16   Wt 87.5 kg (193 lb)   LMP 2023   BMI 33.13 kg/m²     Physical Exam  Exam conducted with a chaperone present.   Genitourinary:     General: Normal vulva.      Exam position: Lithotomy position.      Vagina: Normal.      Cervix: Normal. Discharge present.       Uterus: Normal.       Adnexa: Right adnexa normal and left adnexa normal.      Rectum: Normal.      Comments: Brown mucousy discharge noted        Lab Review   No data reviewed    Imaging   No data reviewed        Assessment & Plan   Diagnoses and all orders for this visit:    1. Abnormal uterine bleeding (AUB) (Primary)  -     Follicle Stimulating Hormone; Future  -     Luteinizing Hormone; Future  -     Prolactin; Future    Labs ordered and depending on results will follow up with patient regarding next steps.     The importance of keeping all planned follow-up and taking all medications as prescribed was emphasized.    Return for Next scheduled follow up.    No orders of the defined types were placed in this encounter.                This note was electronically signed.    Shantel Sánchez, APRN  January 11, 2024

## 2024-01-12 LAB
FSH SERPL-ACNC: 4.14 MIU/ML
LH SERPL-ACNC: 5.04 MIU/ML
PROLACTIN SERPL-MCNC: 12.5 NG/ML (ref 4.79–23.3)

## 2024-01-12 NOTE — PROGRESS NOTES
Good morning,  Your labs have resulted, and I see no concerns with any of them that would indicate any changes in ovarian function. I'd like you to keep track of your cycles for the next few months and see how it goes- you have an appointment in June with Dr Maher, but if after 2-3 months this intermenstrual bleeding persists, it would be reasonable to try to be seen again sooner for further evaluation. Please let me know if you have any questions!  Thanks,  Shantel BARR

## 2024-03-07 ENCOUNTER — LAB (OUTPATIENT)
Dept: LAB | Facility: HOSPITAL | Age: 34
End: 2024-03-07
Payer: COMMERCIAL

## 2024-03-07 ENCOUNTER — OFFICE VISIT (OUTPATIENT)
Dept: NEUROLOGY | Facility: CLINIC | Age: 34
End: 2024-03-07
Payer: COMMERCIAL

## 2024-03-07 VITALS
BODY MASS INDEX: 32.81 KG/M2 | HEART RATE: 86 BPM | WEIGHT: 192.2 LBS | HEIGHT: 64 IN | OXYGEN SATURATION: 98 % | DIASTOLIC BLOOD PRESSURE: 74 MMHG | SYSTOLIC BLOOD PRESSURE: 118 MMHG

## 2024-03-07 DIAGNOSIS — Z86.79: ICD-10-CM

## 2024-03-07 DIAGNOSIS — M31.6 TEMPORAL ARTERITIS: ICD-10-CM

## 2024-03-07 DIAGNOSIS — M31.6 TEMPORAL ARTERITIS: Primary | ICD-10-CM

## 2024-03-07 LAB
CHROMATIN AB SERPL-ACNC: <10 IU/ML (ref 0–14)
CRP SERPL-MCNC: <0.3 MG/DL (ref 0–0.5)
ERYTHROCYTE [SEDIMENTATION RATE] IN BLOOD: 61 MM/HR (ref 0–20)

## 2024-03-07 PROCEDURE — 81241 F5 GENE: CPT

## 2024-03-07 PROCEDURE — 82785 ASSAY OF IGE: CPT

## 2024-03-07 PROCEDURE — 86235 NUCLEAR ANTIGEN ANTIBODY: CPT

## 2024-03-07 PROCEDURE — 85598 HEXAGNAL PHOSPH PLTLT NEUTRL: CPT

## 2024-03-07 PROCEDURE — 86147 CARDIOLIPIN ANTIBODY EA IG: CPT

## 2024-03-07 PROCEDURE — 85305 CLOT INHIBIT PROT S TOTAL: CPT

## 2024-03-07 PROCEDURE — 85597 PHOSPHOLIPID PLTLT NEUTRALIZ: CPT

## 2024-03-07 PROCEDURE — 84155 ASSAY OF PROTEIN SERUM: CPT

## 2024-03-07 PROCEDURE — 86334 IMMUNOFIX E-PHORESIS SERUM: CPT

## 2024-03-07 PROCEDURE — 85670 THROMBIN TIME PLASMA: CPT

## 2024-03-07 PROCEDURE — 86063 ANTISTREPTOLYSIN O SCREEN: CPT

## 2024-03-07 PROCEDURE — 82746 ASSAY OF FOLIC ACID SERUM: CPT

## 2024-03-07 PROCEDURE — 86225 DNA ANTIBODY NATIVE: CPT

## 2024-03-07 PROCEDURE — 84165 PROTEIN E-PHORESIS SERUM: CPT

## 2024-03-07 PROCEDURE — 85610 PROTHROMBIN TIME: CPT

## 2024-03-07 PROCEDURE — 85730 THROMBOPLASTIN TIME PARTIAL: CPT

## 2024-03-07 PROCEDURE — 85613 RUSSELL VIPER VENOM DILUTED: CPT

## 2024-03-07 PROCEDURE — 82607 VITAMIN B-12: CPT

## 2024-03-07 PROCEDURE — 86038 ANTINUCLEAR ANTIBODIES: CPT

## 2024-03-07 PROCEDURE — 82784 ASSAY IGA/IGD/IGG/IGM EACH: CPT

## 2024-03-07 PROCEDURE — 85306 CLOT INHIBIT PROT S FREE: CPT

## 2024-03-07 PROCEDURE — 86431 RHEUMATOID FACTOR QUANT: CPT

## 2024-03-07 PROCEDURE — 86146 BETA-2 GLYCOPROTEIN ANTIBODY: CPT

## 2024-03-07 PROCEDURE — 99214 OFFICE O/P EST MOD 30 MIN: CPT | Performed by: NURSE PRACTITIONER

## 2024-03-07 PROCEDURE — 85732 THROMBOPLASTIN TIME PARTIAL: CPT

## 2024-03-07 PROCEDURE — 85652 RBC SED RATE AUTOMATED: CPT

## 2024-03-07 PROCEDURE — 86747 PARVOVIRUS ANTIBODY: CPT

## 2024-03-07 PROCEDURE — 86140 C-REACTIVE PROTEIN: CPT

## 2024-03-07 PROCEDURE — 86617 LYME DISEASE ANTIBODY: CPT

## 2024-03-07 PROCEDURE — 36415 COLL VENOUS BLD VENIPUNCTURE: CPT

## 2024-03-07 PROCEDURE — 81240 F2 GENE: CPT

## 2024-03-07 RX ORDER — MELATONIN
1000 DAILY
COMMUNITY

## 2024-03-07 RX ORDER — PRENATAL VIT NO.126/IRON/FOLIC 28MG-0.8MG
TABLET ORAL DAILY
COMMUNITY

## 2024-03-07 NOTE — PROGRESS NOTES
Neuro Office Visit      Encounter Date: 2024   Patient Name: Aye Nance  : 1990   MRN: [id number]   PCP:  Donnie Mariano DO     Chief Complaint:    Chief Complaint   Patient presents with    Headache       History of Present Illness: Aye Nance is a 33 y.o. female who is here today in Neurology for headache, swelling at temples.    Previous history of migraines approximately 2 or 3 years ago but none that are concerning.    Has noted that she will have random episodes where her temples will swell bilaterally.  The swelling will last for a minute or less.    This is occurred many times especially if she is overheated.    During these episodes her eyes feel very heavy.    The swelling is evident to people around her.    Her PCP is concerned that she could have temporal arteritis.    CT scan of the head was performed with no abnormalities noted.    Aye has a 42-year-old sister who had a stroke several months ago.    Following the stroke she was diagnosed with moyamoya and was found to have diminutive vessels in her brain.    Aye is concerned that she could have this disorder as well.  She has had no workup other than CT scan.    Denies weakness, numbness or tingling, speech deficit, vision changes.      CT Head Without Contrast (2023 08:58)     Subjective      Past Medical History:   Past Medical History:   Diagnosis Date    Gestational diabetes on insulin in third trimester        Past Surgical History:   Past Surgical History:   Procedure Laterality Date     SECTION  2014     SECTION N/A 2023    Procedure:  SECTION REPEAT;  Surgeon: Tootie Maher MD;  Location: [facility] LABOR [facility]IVERY;  Service: Obstetrics/Gynecology;  Laterality: N/A;    WISDOM TOOTH EXTRACTION         Family History:   Family History   Problem Relation Age of Onset    Diabetes Mother     Cancer Father     Stroke Sister 42    Pancreatic  cancer Maternal Grandmother     Stroke Maternal Grandfather     Stroke Paternal Grandmother     Breast cancer Neg Hx     Ovarian cancer Neg Hx     Colon cancer Neg Hx     Osteoporosis Neg Hx        Social History:   Social History     Socioeconomic History    Marital status: Single   Tobacco Use    Smoking status: Never    Smokeless tobacco: Never   Vaping Use    Vaping status: Never Used   Substance and Sexual Activity    Alcohol use: Never    Drug use: Never    Sexual activity: Yes     Partners: Male     Birth control/protection: None       Medications:     Current Outpatient Medications:     Biotin w/ Vitamins C & E (HAIR SKIN & NAILS GUMMIES PO), Take  by mouth., Disp: , Rfl:     cholecalciferol 25 MCG (1000 UT) tablet, Take 1 tablet by mouth Daily., Disp: , Rfl:     docusate sodium (COLACE) 100 MG capsule, Take 1 capsule by mouth 2 (Two) Times a Day As Needed for Constipation., Disp: 60 capsule, Rfl: 1    ibuprofen (ADVIL,MOTRIN) 600 MG tablet, Take 1 tablet by mouth Every 6 (Six) Hours As Needed for Mild Pain., Disp: 60 tablet, Rfl: 1    prenatal vitamin (prenatal, CLASSIC, vitamin) tablet, Take  by mouth Daily., Disp: , Rfl:     Allergies:   Allergies   Allergen Reactions    Levemir [Insulin Detemir] Itching     Itching and bruising at injection site.       PHQ-9 Total Score:     STEADI Fall Risk Assessment has not been completed.    Objective     Physical Exam:   Physical Exam  Vitals reviewed.   Eyes:      Extraocular Movements: EOM normal.      Pupils: Pupils are equal, round, and reactive to light.   Neurological:      Mental Status: She is oriented to person, place, and time.      Coordination: Finger-Nose-Finger Test, Heel to Shin Test and Romberg Test normal.      Gait: Gait is intact. Tandem walk normal.      Deep Tendon Reflexes:      Reflex Scores:       Tricep reflexes are 2+ on the right side and 2+ on the left side.       Bicep reflexes are 2+ on the right side and 2+ on the left side.        Brachioradialis reflexes are 2+ on the right side and 2+ on the left side.       Patellar reflexes are 2+ on the right side and 2+ on the left side.       Achilles reflexes are 2+ on the right side and 2+ on the left side.  Psychiatric:         Speech: Speech normal.         Neurologic Exam     Mental Status   Oriented to person, place, and time.   Attention: normal. Concentration: normal.   Speech: speech is normal   Level of consciousness: alert  Knowledge: good.     Cranial Nerves     CN II   Visual fields full to confrontation.     CN III, IV, VI   Pupils are equal, round, and reactive to light.  Extraocular motions are normal.   CN III: no CN III palsy  CN VI: no CN VI palsy    CN V   Facial sensation intact.     CN VII   Facial expression full, symmetric.     CN VIII   CN VIII normal.     CN IX, X   CN IX normal.   CN X normal.     CN XI   CN XI normal.     CN XII   CN XII normal.     Motor Exam     Strength   Right neck flexion: 5/5  Left neck flexion: 5/5  Right neck extension: 5/5  Left neck extension: 5/5  Right deltoid: 5/5  Left deltoid: 5/5  Right biceps: 5/5  Left biceps: 5/5  Right triceps: 5/5  Left triceps: 5/5  Right wrist flexion: 5/5  Left wrist flexion: 5/5  Right wrist extension: 5/5  Left wrist extension: 5/5  Right interossei: 5/5  Left interossei: 5/5  Right abdominals: 5/5  Left abdominals: 5/5  Right iliopsoas: 5/5  Left iliopsoas: 5/5  Right quadriceps: 5/5  Left quadriceps: 5/5  Right hamstrin/5  Left hamstrin/5  Right glutei: 5/5  Left glutei: 5/5  Right anterior tibial: 5/5  Left anterior tibial: 5/5  Right posterior tibial: 5/5  Left posterior tibial: 5/5  Right peroneal: 5/5  Left peroneal: 5/5  Right gastroc: 5/5  Left gastroc: 5/5    Sensory Exam   Light touch normal.     Gait, Coordination, and Reflexes     Gait  Gait: normal    Coordination   Romberg: negative  Finger to nose coordination: normal  Heel to shin coordination: normal  Tandem walking coordination:  "normal    Tremor   Resting tremor: absent  Intention tremor: absent  Action tremor: absent    Reflexes   Right brachioradialis: 2+  Left brachioradialis: 2+  Right biceps: 2+  Left biceps: 2+  Right triceps: 2+  Left triceps: 2+  Right patellar: 2+  Left patellar: 2+  Right achilles: 2+  Left achilles: 2+  Right : 2+  Left : 2+       Vital Signs:   Vitals:    03/07/24 1131   BP: 118/74   Pulse: 86   SpO2: 98%   Weight: 87.2 kg (192 lb 3.2 oz)   Height: 162.6 cm (64\")     Body mass index is 32.99 kg/m².     Results:   Imaging:   CT Head Without Contrast    Result Date: 12/11/2023  Impression: 1. No acute intracranial abnormality. Specifically, no evidence of acute hemorrhage, mass effect or midline shift. Electronically Signed: Yuan Choielor  12/11/2023 12:44 PM EST  Workstation ID: [id number]       Labs:    No results found for: \"CMP\", \"PROTEIN\", \"ANTIMOGAB\", \"IBXNGU2RAAH\", \"JCVRESULT\", \"QUANTTBGOLD\", \"CBCDIF\", \"IGGALBSER\"     Assessment / Plan      Assessment/Plan:   Diagnoses and all orders for this visit:    1. Possible Temporal arteritis (Primary)  Comments:  MRI of the brain  Orders:  -     Sjogren's Antibody, Anti-SS-A / -SS-B; Future  -     Immunoglobulins A/E/G/M Serum; Future  -     Lupus Anticoagulant Panel; Future  -     Vitamin B12 & Folate; Future  -     ANIBAL by IFA, Reflex 9-biomarkers profile; Future  -     Sedimentation Rate; Future  -     ANIBAL With / DsDNA, RNP, Sjogrens A / B, Jones; Future  -     Rheumatoid Factor; Future  -     C-reactive Protein; Future  -     Parvovirus B19 Antibody, IgG & IgM; Future  -     Antistreptolysin O Screen; Future  -     Factor II, DNA Analysis; Future  -     Factor 5 Leiden; Future  -     Cardiolipin Antibody; Future  -     SARBJIT + PE; Future  -     Lyme Disease, Line Blot; Future  -     Protein S Antigen, Total; Future  -     Protein S Antigen, Free; Future  -     Protein S Panel; Future  -     Protein Elec + Interp, Serum; Future  -     MRI Brain With & " Without Contrast; Future  -     MRI Angiogram Head With & Without Contrast; Future    2. Family History of Moyamoya syndrome in sister  Comments:  MRI, MRA of the brain  Check labs  Recommend genetic testing  Orders:  -     Sjogren's Antibody, Anti-SS-A / -SS-B; Future  -     Immunoglobulins A/E/G/M Serum; Future  -     Lupus Anticoagulant Panel; Future  -     Vitamin B12 & Folate; Future  -     ANIBAL by IFA, Reflex 9-biomarkers profile; Future  -     Sedimentation Rate; Future  -     ANIBAL With / DsDNA, RNP, Sjogrens A / B, Jones; Future  -     Rheumatoid Factor; Future  -     C-reactive Protein; Future  -     Parvovirus B19 Antibody, IgG & IgM; Future  -     Antistreptolysin O Screen; Future  -     Factor II, DNA Analysis; Future  -     Factor 5 Leiden; Future  -     Cardiolipin Antibody; Future  -     SARBJIT + PE; Future  -     Lyme Disease, Line Blot; Future  -     Protein S Antigen, Total; Future  -     Protein S Antigen, Free; Future  -     Protein S Panel; Future  -     Protein Elec + Interp, Serum; Future  -     MRI Brain With & Without Contrast; Future  -     MRI Angiogram Head With & Without Contrast; Future           Patient Education:     Reviewed medications, potential side effects and signs and symptoms to report. Discussed risk versus benefits of treatment plan with patient and/or family-including medications, labs and radiology that may be ordered. Addressed questions and concerns during visit. Patient and/or family verbalized understanding and agree with plan. Instructed to call the office with any questions and report to ER with any life-threatening symptoms.     Follow Up:   Return in about 3 months (around 6/7/2024).    I spent 30  minutes face to face with the patient. I personally spent 50 percent of this time counseling and discussing diagnosis, diagnostic testing, driving, treatment options, and management .       During this visit the following were done:  Labs Reviewed []    Labs Ordered [x]     Radiology Reports Reviewed [x]    Radiology Ordered [x]    PCP Records Reviewed [x]    Referring Provider Records Reviewed [x]    ER Records Reviewed []    Hospital Records Reviewed []    History Obtained From Family []    Radiology Images Reviewed [x]    Other Reviewed [x]    Records Requested []      CORTNEY Valente  [facility]  [address]  [place] [address] [phone]

## 2024-03-08 LAB
ASO AB SERPL-ACNC: NEGATIVE [IU]/ML
F5 GENE MUT ANL BLD/T: NORMAL
FACTOR II, DNA ANALYSIS: NORMAL
FOLATE SERPL-MCNC: >20 NG/ML (ref 4.78–24.2)
VIT B12 BLD-MCNC: 630 PG/ML (ref 211–946)

## 2024-03-10 LAB
PROT S ACT/NOR PPP: 103 % (ref 63–140)
PROT S AG ACT/NOR PPP IA: 106 % (ref 60–150)
PROT S FREE AG ACT/NOR PPP IA: 97 % (ref 61–136)

## 2024-03-11 DIAGNOSIS — R76.8 POSITIVE ANA (ANTINUCLEAR ANTIBODY): Primary | ICD-10-CM

## 2024-03-11 DIAGNOSIS — R76.8 ANTI-RNP ANTIBODIES PRESENT: ICD-10-CM

## 2024-03-11 LAB
ALBUMIN SERPL ELPH-MCNC: 4 G/DL (ref 2.9–4.4)
ALBUMIN/GLOB SERPL: 1.2 {RATIO} (ref 0.7–1.7)
ALPHA1 GLOB SERPL ELPH-MCNC: 0.2 G/DL (ref 0–0.4)
ALPHA2 GLOB SERPL ELPH-MCNC: 0.7 G/DL (ref 0.4–1)
ANA SER QL IF: NEGATIVE
ANA SER QL: POSITIVE
B-GLOBULIN SERPL ELPH-MCNC: 1.1 G/DL (ref 0.7–1.3)
CENTROMERE B AB SER-ACNC: <0.2 AI (ref 0–0.9)
CHROMATIN AB SERPL-ACNC: <0.2 AI (ref 0–0.9)
DSDNA AB SER-ACNC: 7 IU/ML (ref 0–9)
ENA JO1 AB SER-ACNC: <0.2 AI (ref 0–0.9)
ENA RNP AB SER-ACNC: >8 AI (ref 0–0.9)
ENA SCL70 AB SER-ACNC: <0.2 AI (ref 0–0.9)
ENA SM AB SER-ACNC: <0.2 AI (ref 0–0.9)
ENA SS-A AB SER-ACNC: 0.5 AI (ref 0–0.9)
ENA SS-A AB SER-ACNC: 0.5 AI (ref 0–0.9)
ENA SS-B AB SER-ACNC: <0.2 AI (ref 0–0.9)
ENA SS-B AB SER-ACNC: <0.2 AI (ref 0–0.9)
GAMMA GLOB SERPL ELPH-MCNC: 1.4 G/DL (ref 0.4–1.8)
GLOBULIN SER-MCNC: 3.4 G/DL (ref 2.2–3.9)
IGA SERPL-MCNC: 202 MG/DL (ref 87–352)
IGG SERPL-MCNC: 1416 MG/DL (ref 586–1602)
IGM SERPL-MCNC: 272 MG/DL (ref 26–217)
INTERPRETATION SERPL IEP-IMP: ABNORMAL
LABORATORY COMMENT REPORT: ABNORMAL
LABORATORY COMMENT REPORT: NORMAL
Lab: ABNORMAL
M PROTEIN SERPL ELPH-MCNC: ABNORMAL G/DL
PROT SERPL-MCNC: 7.4 G/DL (ref 6–8.5)

## 2024-03-11 NOTE — PROGRESS NOTES
Please let Aye know that her sedimentation rate was elevated at 61. Sed rate is reflective of inflammation but is not specific to an area. Labs for Protein S, Factor II, Factor 5 Leiden, Vitamin B12 and folate, Rheumatoid factor, and C-reactive protein are all negative.

## 2024-03-11 NOTE — PROGRESS NOTES
Please let Aye know that her ANIBAL was positive and showing RNP antibodies which can be suggestive of mixed connective tissue diseases such as lupus.  I will send a referral to rheumatology for further testing.

## 2024-03-12 ENCOUNTER — TELEPHONE (OUTPATIENT)
Dept: NEUROLOGY | Facility: CLINIC | Age: 34
End: 2024-03-12
Payer: COMMERCIAL

## 2024-03-12 LAB
B BURGDOR IGG PATRN SER IB-IMP: NEGATIVE
B BURGDOR IGM PATRN SER IB-IMP: NEGATIVE
B BURGDOR18KD IGG SER QL IB: NORMAL
B BURGDOR23KD IGG SER QL IB: NORMAL
B BURGDOR23KD IGM SER QL IB: NORMAL
B BURGDOR28KD IGG SER QL IB: NORMAL
B BURGDOR30KD IGG SER QL IB: NORMAL
B BURGDOR39KD IGG SER QL IB: NORMAL
B BURGDOR39KD IGM SER QL IB: NORMAL
B BURGDOR41KD IGG SER QL IB: NORMAL
B BURGDOR41KD IGM SER QL IB: NORMAL
B BURGDOR45KD IGG SER QL IB: NORMAL
B BURGDOR58KD IGG SER QL IB: NORMAL
B BURGDOR66KD IGG SER QL IB: NORMAL
B BURGDOR93KD IGG SER QL IB: NORMAL
B19V IGG SER IA-ACNC: 0.4 INDEX (ref 0–0.8)
B19V IGM SER IA-ACNC: 0.1 INDEX (ref 0–0.8)

## 2024-03-12 NOTE — TELEPHONE ENCOUNTER
"Relay     \"***\"        {Reminder:00171}  {-Use plain, clear language that can be read verbatim:31106}  {-Relay should not be used to communicate critical labs, sensitive test results, and abnormal pathology results:29499}  {-When communication abnormal radiology and other diagnostic results, please include a follow-up plan:33104}  "

## 2024-03-12 NOTE — TELEPHONE ENCOUNTER
VM for Pt to let them know their ANIBAL was positive and showing RNP antibodies which can be suggestive of mixed connective tissue diseases such as lupus. I will send a referral to rheumatology for further testing.

## 2024-03-12 NOTE — TELEPHONE ENCOUNTER
----- Message from CORTNEY Valente sent at 3/11/2024  3:42 PM EDT -----  Please let Aye know that her ANIBAL was positive and showing RNP antibodies which can be suggestive of mixed connective tissue diseases such as lupus.  I will send a referral to rheumatology for further testing.

## 2024-03-15 LAB — IGE SERPL-ACNC: 17 IU/ML (ref 6–495)

## 2024-03-18 LAB
APTT HEX PL PPP: 7 SEC
APTT IMM NP PPP: NORMAL SEC
APTT PPP 1:1 SALINE: NORMAL SEC
APTT PPP: 28.8 SEC
B2 GLYCOPROT1 IGA SER-ACNC: <10 SAU
B2 GLYCOPROT1 IGG SER-ACNC: <10 SGU
B2 GLYCOPROT1 IGM SER-ACNC: <10 SMU
CARDIOLIPIN IGG SER IA-ACNC: <10 GPL
CARDIOLIPIN IGM SER IA-ACNC: <10 MPL
CONFIRM APTT: 0.6 SEC
CONFIRM DRVVT: NORMAL SEC
DRVVT SCREEN TO CONFIRM RATIO: NORMAL RATIO
INR PPP: 1 RATIO
LABORATORY COMMENT REPORT: NORMAL
PROTHROMBIN TIME: 10.2 SEC
SCREEN DRVVT: 40 SEC
THROMBIN TIME: 18.1 SEC

## 2024-04-11 ENCOUNTER — HOSPITAL ENCOUNTER (OUTPATIENT)
Dept: MRI IMAGING | Facility: HOSPITAL | Age: 34
Discharge: HOME | End: 2024-04-11
Payer: COMMERCIAL

## 2024-04-11 DIAGNOSIS — M31.6 TEMPORAL ARTERITIS: ICD-10-CM

## 2024-04-11 DIAGNOSIS — Z86.79: ICD-10-CM

## 2024-04-11 PROCEDURE — 0 GADOBENATE DIMEGLUMINE 529 MG/ML SOLUTION: Performed by: NURSE PRACTITIONER

## 2024-04-11 PROCEDURE — 70553 MRI BRAIN STEM W/O & W/DYE: CPT

## 2024-04-11 PROCEDURE — 70546 MR ANGIOGRAPH HEAD W/O&W/DYE: CPT

## 2024-04-11 PROCEDURE — A9577 INJ MULTIHANCE: HCPCS | Performed by: NURSE PRACTITIONER

## 2024-04-11 RX ADMIN — GADOBENATE DIMEGLUMINE 15 ML: 529 INJECTION, SOLUTION INTRAVENOUS at 09:44

## 2024-04-15 NOTE — PROGRESS NOTES
Would you please let Aye know that the MRI of her brain showed no evidence of aneurysm.  It does have 1 change that the radiologist read and I cannot see it so I want Dr. Wayne to look at it with me when he returns on Wednesday.  I will call her back after that to go over the rest of the scans.

## 2024-06-13 ENCOUNTER — OFFICE VISIT (OUTPATIENT)
Dept: NEUROLOGY | Facility: CLINIC | Age: 34
End: 2024-06-13
Payer: COMMERCIAL

## 2024-06-13 VITALS
DIASTOLIC BLOOD PRESSURE: 74 MMHG | OXYGEN SATURATION: 99 % | HEIGHT: 64 IN | SYSTOLIC BLOOD PRESSURE: 102 MMHG | HEART RATE: 63 BPM | BODY MASS INDEX: 31.79 KG/M2 | WEIGHT: 186.2 LBS

## 2024-06-13 DIAGNOSIS — Z86.79: ICD-10-CM

## 2024-06-13 DIAGNOSIS — M31.6 TEMPORAL ARTERITIS: Primary | ICD-10-CM

## 2024-06-13 PROCEDURE — 99213 OFFICE O/P EST LOW 20 MIN: CPT | Performed by: NURSE PRACTITIONER

## 2024-06-13 NOTE — PROGRESS NOTES
Neuro Office Visit      Encounter Date: 2024   Patient Name: Aye Nance  : 1990   MRN: [id number]   PCP:  Donnie Mariano DO     Chief Complaint:    Chief Complaint   Patient presents with    Possible Temporal arteritis     F/U       History of Present Illness: Aye Nance is a 33 y.o. female who is here today in Neurology for headache.  History of Present Illness    The patient reports overall good health.    MRI of the brain was reviewed with the patient.  She does have some very mild white matter and possibly a small Chiari malformation but has no symptoms from this.    Experiences some discomfort in the temples which are relieved with massage and water showers. However, she experienced neck tightness last night, accompanied by a popping sensation in her neck.     She continues to experience sporadic headaches and temple swelling, albeit less severe than previous instances.      Subjective      Past Medical History:   Past Medical History:   Diagnosis Date    Gestational diabetes on insulin in third trimester        Past Surgical History:   Past Surgical History:   Procedure Laterality Date     SECTION  2014     SECTION N/A 2023    Procedure:  SECTION REPEAT;  Surgeon: Tootie Maher MD;  Location: [facility] LABOR DELIVERY;  Service: Obstetrics/Gynecology;  Laterality: N/A;    WISDOM TOOTH EXTRACTION         Family History:   Family History   Problem Relation Age of Onset    Diabetes Mother     Cancer Father     Stroke Sister 42    Pancreatic cancer Maternal Grandmother     Stroke Maternal Grandfather     Stroke Paternal Grandmother     Breast cancer Neg Hx     Ovarian cancer Neg Hx     Colon cancer Neg Hx     Osteoporosis Neg Hx        Social History:   Social History     Socioeconomic History    Marital status: Single   Tobacco Use    Smoking status: Never    Smokeless tobacco: Never   Vaping Use    Vaping status: Never  Used   Substance and Sexual Activity    Alcohol use: Never    Drug use: Never    Sexual activity: Yes     Partners: Male     Birth control/protection: None       Medications:     Current Outpatient Medications:     Biotin w/ Vitamins C & E (HAIR SKIN & NAILS GUMMIES PO), Take  by mouth., Disp: , Rfl:     cholecalciferol 25 MCG (1000 UT) tablet, Take 1 tablet by mouth Daily., Disp: , Rfl:     docusate sodium (COLACE) 100 MG capsule, Take 1 capsule by mouth 2 (Two) Times a Day As Needed for Constipation., Disp: 60 capsule, Rfl: 1    ibuprofen (ADVIL,MOTRIN) 600 MG tablet, Take 1 tablet by mouth Every 6 (Six) Hours As Needed for Mild Pain., Disp: 60 tablet, Rfl: 1    prenatal vitamin (prenatal, CLASSIC, vitamin) tablet, Take  by mouth Daily., Disp: , Rfl:     Probiotic Product (PROBIOTIC DAILY PO), , Disp: , Rfl:     Allergies:   Allergies   Allergen Reactions    Levemir [Insulin Detemir] Itching     Itching and bruising at injection site.       PHQ-9 Total Score:     STEADI Fall Risk Assessment has not been completed.    Objective     Physical Exam:     Neurologic Exam     Mental Status   Oriented to person, place, and time.   Attention: normal.   Speech: speech is normal   Level of consciousness: alert  Knowledge: good.     Cranial Nerves     CN II   Visual fields full to confrontation.     CN III, IV, VI   Pupils are equal, round, and reactive to light.  Extraocular motions are normal.   CN III: no CN III palsy  CN VI: no CN VI palsy    CN V   Facial sensation intact.     CN VII   Facial expression full, symmetric.     CN VIII   CN VIII normal.     CN IX, X   CN IX normal.   CN X normal.     CN XI   CN XI normal.     CN XII   CN XII normal.     Motor Exam   Muscle bulk: normal  Overall muscle tone: normal    Strength   Right neck flexion: 5/5  Left neck flexion: 5/5  Right neck extension: 5/5  Left neck extension: 5/5  Right deltoid: 5/5  Left deltoid: 5/5  Right biceps: 5/5  Left biceps: 5/5  Right triceps:  "5/5  Left triceps: 5/5  Right wrist flexion: 5/5  Left wrist flexion: 5/5  Right wrist extension: 5/5  Left wrist extension: 5/5  Right interossei: 5/5  Left interossei: 5/5  Right abdominals: 5/5  Left abdominals: 5/5  Right iliopsoas: 5/5  Left iliopsoas: 5/5  Right quadriceps: 5/5  Left quadriceps: 5/5  Right hamstrin/5  Left hamstrin/5  Right glutei: 5/5  Left glutei: 5/5  Right anterior tibial: 5/5  Left anterior tibial: 5/5  Right posterior tibial: 5/5  Left posterior tibial: 5/5  Right peroneal: 5/5  Left peroneal: 5/5  Right gastroc: 5/5  Left gastroc: 5/5    Sensory Exam   Light touch normal.     Gait, Coordination, and Reflexes     Gait  Gait: normal    Coordination   Romberg: negative  Finger to nose coordination: normal  Heel to shin coordination: normal  Tandem walking coordination: normal    Tremor   Resting tremor: absent  Intention tremor: absent  Action tremor: absent    Reflexes   Right brachioradialis: 2+  Left brachioradialis: 2+  Right biceps: 2+  Left biceps: 2+  Right triceps: 2+  Left triceps: 2+  Right patellar: 2+  Left patellar: 2+  Right achilles: 2+  Left achilles: 2+  Right : 2+  Left : 2+       Vital Signs:   Vitals:    24 1102   BP: 102/74   Pulse: 63   SpO2: 99%   Weight: 84.5 kg (186 lb 3.2 oz)   Height: 162.6 cm (64\")     Body mass index is 31.96 kg/m².     Results:   Results  Imaging  MRI of the brain shows good brain volume, a little bit of sinus thickening in the nose, and tiny white matter in the ventricles. No evidence of aneurysms in the brain.     Imaging:   MRI Angiogram Head With & Without Contrast    Result Date: 2024  1.Faint T2/FLAIR signal hyperintensity seen bilaterally within the corticospinal tracts. This may be artifactual. Neurodegenerative processes such as ALS may have a similar appearance. Please correlate with patient's symptomatology, and consider short interval follow-up if clinically indicated. 2.Inferior cerebellar tonsillar " "ectopia measuring 5 to 6 mm. 3.No diffusion restriction is identified to suggest acute infarct. 4.No abnormal contrast enhancement is identified. 5.No significant vascular abnormality is identified. Electronically Signed: Mason Sosa MD  4/11/2024 10:23 AM EDT  Workstation ID: [id number]    MRI Brain With & Without Contrast    Result Date: 4/11/2024  1.Faint T2/FLAIR signal hyperintensity seen bilaterally within the corticospinal tracts. This may be artifactual. Neurodegenerative processes such as ALS may have a similar appearance. Please correlate with patient's symptomatology, and consider short interval follow-up if clinically indicated. 2.Inferior cerebellar tonsillar ectopia measuring 5 to 6 mm. 3.No diffusion restriction is identified to suggest acute infarct. 4.No abnormal contrast enhancement is identified. 5.No significant vascular abnormality is identified. Electronically Signed: Mason Sosa MD  4/11/2024 10:23 AM EDT  Workstation ID: [id number]       Labs:   No results found for: \"CMP\", \"PROTEIN\", \"ANTIMOGAB\", \"OKARVL3DCNB\", \"JCVRESULT\", \"QUANTTBGOLD\", \"CBCDIF\", \"IGGALBSER\"     Assessment / Plan      Assessment/Plan:   Diagnoses and all orders for this visit:    1. Possible Temporal arteritis (Primary)  Comments:  Negative for temporal arteritis  Continues to have some brow headache pain    2. Family History of Moyamoya syndrome in sister           Patient Education:     Reviewed medications, potential side effects and signs and symptoms to report. Discussed risk versus benefits of treatment plan with patient and/or family-including medications, labs and radiology that may be ordered. Addressed questions and concerns during visit. Patient and/or family verbalized understanding and agree with plan. Instructed to call the office with any questions and report to ER with any life-threatening symptoms.     Follow Up:   Return in about 6 months (around 12/13/2024).    I spent 15 minutes caring for Aye " on this date of service. This time includes time spent by me in the following activities: preparing for the visit, reviewing tests, obtaining and/or reviewing a separately obtained history, performing a medically appropriate examination and/or evaluation, counseling and educating the patient/family/caregiver, and documenting information in the medical record.        During this visit the following were done:  Labs Reviewed []    Labs Ordered []    Radiology Reports Reviewed [x]    Radiology Ordered []    PCP Records Reviewed []    Referring Provider Records Reviewed []    ER Records Reviewed []    Hospital Records Reviewed []    History Obtained From Family []    Radiology Images Reviewed [x]    Other Reviewed []    Records Requested []      Patient or patient representative verbalized consent for the use of Ambient Listening during the visit with  CORTNEY Valente for chart documentation. 6/13/2024  13:08 EDT    CORTNEY Valente   [facility]  [address] [address]  [phone]

## 2024-09-27 ENCOUNTER — FLU SHOT (OUTPATIENT)
Dept: INTERNAL MEDICINE | Facility: CLINIC | Age: 34
End: 2024-09-27
Payer: COMMERCIAL

## 2024-09-27 DIAGNOSIS — Z23 NEED FOR INFLUENZA VACCINATION: Primary | ICD-10-CM

## 2024-09-27 PROCEDURE — 90471 IMMUNIZATION ADMIN: CPT | Performed by: INTERNAL MEDICINE

## 2024-09-27 PROCEDURE — 90656 IIV3 VACC NO PRSV 0.5 ML IM: CPT | Performed by: INTERNAL MEDICINE

## 2024-11-14 ENCOUNTER — INITIAL PRENATAL (OUTPATIENT)
Dept: OBSTETRICS AND GYNECOLOGY | Facility: CLINIC | Age: 34
End: 2024-11-14
Payer: COMMERCIAL

## 2024-11-14 ENCOUNTER — LAB (OUTPATIENT)
Dept: LAB | Facility: HOSPITAL | Age: 34
End: 2024-11-14
Payer: COMMERCIAL

## 2024-11-14 VITALS — BODY MASS INDEX: 31.79 KG/M2 | DIASTOLIC BLOOD PRESSURE: 68 MMHG | SYSTOLIC BLOOD PRESSURE: 110 MMHG | WEIGHT: 185.2 LBS

## 2024-11-14 DIAGNOSIS — Z86.32 HISTORY OF GESTATIONAL DIABETES IN PRIOR PREGNANCY, CURRENTLY PREGNANT: ICD-10-CM

## 2024-11-14 DIAGNOSIS — O09.299 HISTORY OF GESTATIONAL DIABETES IN PRIOR PREGNANCY, CURRENTLY PREGNANT: ICD-10-CM

## 2024-11-14 DIAGNOSIS — O09.521 MULTIGRAVIDA OF ADVANCED MATERNAL AGE IN FIRST TRIMESTER: ICD-10-CM

## 2024-11-14 DIAGNOSIS — Z98.891 HISTORY OF CESAREAN DELIVERY: ICD-10-CM

## 2024-11-14 DIAGNOSIS — O09.91 HIGH-RISK PREGNANCY IN FIRST TRIMESTER: Primary | ICD-10-CM

## 2024-11-14 DIAGNOSIS — O09.91 HIGH-RISK PREGNANCY IN FIRST TRIMESTER: ICD-10-CM

## 2024-11-14 PROBLEM — N93.9 ABNORMAL UTERINE BLEEDING (AUB): Status: RESOLVED | Noted: 2024-01-11 | Resolved: 2024-11-14

## 2024-11-14 LAB
ABO GROUP BLD: NORMAL
AMPHET+METHAMPHET UR QL: NEGATIVE
AMPHETAMINES UR QL: NEGATIVE
BARBITURATES UR QL SCN: NEGATIVE
BASOPHILS # BLD AUTO: 0.02 10*3/MM3 (ref 0–0.2)
BASOPHILS NFR BLD AUTO: 0.3 % (ref 0–1.5)
BENZODIAZ UR QL SCN: NEGATIVE
BLD GP AB SCN SERPL QL: NEGATIVE
BUPRENORPHINE SERPL-MCNC: NEGATIVE NG/ML
C TRACH RRNA SPEC DONR QL NAA+PROBE: NEGATIVE
CANNABINOIDS SERPL QL: NEGATIVE
COCAINE UR QL: NEGATIVE
DEPRECATED RDW RBC AUTO: 42.5 FL (ref 37–54)
EOSINOPHIL # BLD AUTO: 0.02 10*3/MM3 (ref 0–0.4)
EOSINOPHIL NFR BLD AUTO: 0.3 % (ref 0.3–6.2)
ERYTHROCYTE [DISTWIDTH] IN BLOOD BY AUTOMATED COUNT: 15.6 % (ref 12.3–15.4)
FENTANYL UR-MCNC: NEGATIVE NG/ML
GP B STREP RRNA SPEC QL PROBE: NEGATIVE
GP B STREP RRNA SPEC QL PROBE: NORMAL
HCT VFR BLD AUTO: 33.9 % (ref 34–46.6)
HGB BLD-MCNC: 10.8 G/DL (ref 12–15.9)
IMM GRANULOCYTES # BLD AUTO: 0.02 10*3/MM3 (ref 0–0.05)
IMM GRANULOCYTES NFR BLD AUTO: 0.3 % (ref 0–0.5)
LYMPHOCYTES # BLD AUTO: 2.38 10*3/MM3 (ref 0.7–3.1)
LYMPHOCYTES NFR BLD AUTO: 36.1 % (ref 19.6–45.3)
MCH RBC QN AUTO: 24.4 PG (ref 26.6–33)
MCHC RBC AUTO-ENTMCNC: 31.9 G/DL (ref 31.5–35.7)
MCV RBC AUTO: 76.7 FL (ref 79–97)
METHADONE UR QL SCN: NEGATIVE
MONOCYTES # BLD AUTO: 0.49 10*3/MM3 (ref 0.1–0.9)
MONOCYTES NFR BLD AUTO: 7.4 % (ref 5–12)
N GONORRHOEA DNA SPEC QL NAA+PROBE: NEGATIVE
NEUTROPHILS NFR BLD AUTO: 3.67 10*3/MM3 (ref 1.7–7)
NEUTROPHILS NFR BLD AUTO: 55.6 % (ref 42.7–76)
OPIATES UR QL: NEGATIVE
OXYCODONE UR QL SCN: NEGATIVE
PCP UR QL SCN: NEGATIVE
PLATELET # BLD AUTO: 247 10*3/MM3 (ref 140–450)
PMV BLD AUTO: 12.4 FL (ref 6–12)
RBC # BLD AUTO: 4.42 10*6/MM3 (ref 3.77–5.28)
RH BLD: POSITIVE
TRICYCLICS UR QL SCN: NEGATIVE
WBC NRBC COR # BLD AUTO: 6.6 10*3/MM3 (ref 3.4–10.8)

## 2024-11-14 PROCEDURE — 80081 OBSTETRIC PANEL INC HIV TSTG: CPT

## 2024-11-14 PROCEDURE — 86901 BLOOD TYPING SEROLOGIC RH(D): CPT

## 2024-11-14 PROCEDURE — 80307 DRUG TEST PRSMV CHEM ANLYZR: CPT | Performed by: OBSTETRICS & GYNECOLOGY

## 2024-11-14 PROCEDURE — 87086 URINE CULTURE/COLONY COUNT: CPT | Performed by: OBSTETRICS & GYNECOLOGY

## 2024-11-14 PROCEDURE — 86850 RBC ANTIBODY SCREEN: CPT

## 2024-11-14 PROCEDURE — 86803 HEPATITIS C AB TEST: CPT

## 2024-11-14 PROCEDURE — 86900 BLOOD TYPING SEROLOGIC ABO: CPT

## 2024-11-14 NOTE — PROGRESS NOTES
Subjective   Chief Complaint   Patient presents with    Initial Prenatal Visit       Aye Nance is a 34 y.o. year old .  Patient's last menstrual period was 2024.  She presents to be seen to initiate prenatal care. Some nausea but no emesis. Declines meds. No vaginal bleeding.     Social History    Tobacco Use      Smoking status: Never      Smokeless tobacco: Never      The following portions of the patient's history were reviewed and updated as appropriate:vital signs, allergies, current medications, past medical history, past social history, past surgical history, and problem list.    Objective   /68   Wt 84 kg (185 lb 3.2 oz)   LMP 2024   BMI 31.79 kg/m²     General: well developed; well nourished  no acute distress  mentation appropriate   Skin: No suspicious lesions seen   Thyroid: normal to inspection and palpation   Heart:  Not performed.   Lungs: breathing is unlabored   Breasts:  Examined in supine position  Symmetric without masses or skin dimpling  Nipples normal without inversion, lesions or discharge  There are no palpable axillary nodes   Abdomen: soft, non-tender; no masses  no umbilical or inguinal hernias are present  no hepato-splenomegaly   Pelvis: Clinical staff was present for exam  External genitalia:  normal appearance of the external genitalia including Bartholin's and Van Wyck's glands.  :  urethral meatus normal;  Vaginal:  normal pink mucosa without prolapse or lesions.  Cervix:  normal appearance. closed       Lab Review   No data reviewed    Imaging   Pelvic ultrasound report    Assessment & Plan   ASSESSMENT  34 y.o. year old  at 10w0d confirmed by ultrasound today   Supervision of high risk pregnancy  Previous C/S with route of delivery to be determined  AMA - declines testing  History of GDM    PLAN  The problem list for pregnancy was initiated today  Tests ordered today:  Orders Placed This Encounter   Procedures    Chlamydia trachomatis,  Neisseria gonorrhoeae, Trichomonas vaginalis, PCR - Swab, Cervix     Standing Status:   Future     Standing Expiration Date:   2024     Order Specific Question:   Release to patient     Answer:   Routine Release [[id number]]    Urine Culture - Urine, Urine, Clean Catch     Standing Status:   Future     Standing Expiration Date:   2025     Order Specific Question:   Release to patient     Answer:   Routine Release [[id number]]    [facility]     Standing Status:   Future     Standing Expiration Date:   2025     Order Specific Question:   Reason for Exam:     Answer:   AMA, hx of GDM, hx of cs     Order Specific Question:   Release to patient     Answer:   Routine Release [[id number]]    OB Panel With HIV and RPR     Standing Status:   Future     Standing Expiration Date:   2025     Order Specific Question:   Release to patient     Answer:   Routine Release [[id number]]    Hepatitis C Antibody     Standing Status:   Future     Standing Expiration Date:   2025     Order Specific Question:   Release to patient     Answer:   Routine Release [[id number]]    Urine Drug Screen - Urine, Clean Catch     Please confirm all positive UDS     Standing Status:   Future     Standing Expiration Date:   2025     Order Specific Question:   Release to patient     Answer:   Routine Release [[id number]]    Glucose, Post 50 Gm Glucola     Standing Status:   Future     Standing Expiration Date:   2025     Order Specific Question:   Release to patient     Answer:   Routine Release [[id number]]     Testing for GC / Chlamydia / trichomonas was done today  Genetic testing reviewed: NIPT ([facility]) and they have considered the information and are not interested in having genetic testing performed.  Information reviewed: exercise in pregnancy, nutrition in pregnancy, weight gain in pregnancy, work and travel restrictions during pregnancy, list of OTC medications acceptable in  pregnancy, call coverage groups, and influenza and COVID-10 booster vaccinations in pregnancy     Total time spent today with Aye  was 30 minutes (level 4).  Off this time, > 50% was spent face-to-face time coordinating care, answering her questions and counseling regarding pathophysiology of her presenting problem along with plans for any diagnositc work-up and treatment.    Follow up: 5 week(s)       This note was electronically signed.    Tootie Maher MD  November 14, 2024

## 2024-11-15 LAB
HBV SURFACE AG SERPL QL IA: NORMAL
HCV AB SER QL: NORMAL
HIV 1+2 AB+HIV1 P24 AG SERPL QL IA: NORMAL
RPR SER QL: NORMAL

## 2024-11-16 LAB
BACTERIA SPEC AEROBE CULT: NORMAL
RUBV IGG SERPL IA-ACNC: 1.36 INDEX

## 2024-11-17 DIAGNOSIS — O99.019 MATERNAL ANEMIA IN PREGNANCY, ANTEPARTUM: Primary | ICD-10-CM

## 2024-11-17 RX ORDER — FERROUS SULFATE 325(65) MG
325 TABLET ORAL
Qty: 30 TABLET | Refills: 11 | Status: SHIPPED | OUTPATIENT
Start: 2024-11-17

## 2024-11-19 ENCOUNTER — DOCUMENTATION (OUTPATIENT)
Dept: OBSTETRICS AND GYNECOLOGY | Facility: CLINIC | Age: 34
End: 2024-11-19
Payer: COMMERCIAL

## 2024-12-09 DIAGNOSIS — O09.91 HIGH-RISK PREGNANCY IN FIRST TRIMESTER: Primary | ICD-10-CM

## 2024-12-09 DIAGNOSIS — O09.521 MULTIGRAVIDA OF ADVANCED MATERNAL AGE IN FIRST TRIMESTER: ICD-10-CM

## 2024-12-12 ENCOUNTER — LAB (OUTPATIENT)
Dept: LAB | Facility: HOSPITAL | Age: 34
End: 2024-12-12
Payer: COMMERCIAL

## 2024-12-12 DIAGNOSIS — O09.91 HIGH-RISK PREGNANCY, FIRST TRIMESTER: Primary | ICD-10-CM

## 2024-12-12 DIAGNOSIS — O09.521 MULTIGRAVIDA OF ADVANCED MATERNAL AGE IN FIRST TRIMESTER: ICD-10-CM

## 2024-12-12 DIAGNOSIS — O09.91 HIGH-RISK PREGNANCY IN FIRST TRIMESTER: ICD-10-CM

## 2024-12-12 DIAGNOSIS — O99.019 MATERNAL ANEMIA IN PREGNANCY, ANTEPARTUM: ICD-10-CM

## 2024-12-12 LAB
EXTERNAL NIPT: NORMAL
FERRITIN SERPL-MCNC: 12.9 NG/ML (ref 13–150)
FOLATE SERPL-MCNC: >20 NG/ML (ref 4.78–24.2)
GLUCOSE 1H P 100 G GLC PO SERPL-MCNC: 157 MG/DL (ref 65–139)
IRON 24H UR-MRATE: 57 MCG/DL (ref 37–145)
IRON SATN MFR SERPL: 13 % (ref 20–50)
TIBC SERPL-MCNC: 446 MCG/DL (ref 298–536)
TRANSFERRIN SERPL-MCNC: 299 MG/DL (ref 200–360)
VIT B12 BLD-MCNC: 439 PG/ML (ref 211–946)

## 2024-12-12 PROCEDURE — 83540 ASSAY OF IRON: CPT

## 2024-12-12 PROCEDURE — 82728 ASSAY OF FERRITIN: CPT

## 2024-12-12 PROCEDURE — 82950 GLUCOSE TEST: CPT

## 2024-12-12 PROCEDURE — 36415 COLL VENOUS BLD VENIPUNCTURE: CPT

## 2024-12-12 PROCEDURE — 84466 ASSAY OF TRANSFERRIN: CPT

## 2024-12-12 PROCEDURE — 82607 VITAMIN B-12: CPT

## 2024-12-12 PROCEDURE — 82746 ASSAY OF FOLIC ACID SERUM: CPT

## 2024-12-12 PROCEDURE — 82948 REAGENT STRIP/BLOOD GLUCOSE: CPT

## 2024-12-13 DIAGNOSIS — O99.810 ABNORMAL O'SULLIVAN GLUCOSE CHALLENGE TEST, ANTEPARTUM: Primary | ICD-10-CM

## 2024-12-17 ENCOUNTER — TELEPHONE (OUTPATIENT)
Dept: OBSTETRICS AND GYNECOLOGY | Facility: CLINIC | Age: 34
End: 2024-12-17
Payer: COMMERCIAL

## 2024-12-17 DIAGNOSIS — O99.810 ABNORMAL O'SULLIVAN GLUCOSE CHALLENGE TEST, ANTEPARTUM: Primary | ICD-10-CM

## 2024-12-17 RX ORDER — BLOOD-GLUCOSE METER
1 KIT MISCELLANEOUS AS NEEDED
Qty: 1 EACH | Refills: 0 | Status: SHIPPED | OUTPATIENT
Start: 2024-12-17

## 2024-12-17 RX ORDER — LANCETS 28 GAUGE
EACH MISCELLANEOUS
Qty: 120 EACH | Refills: 12 | Status: SHIPPED | OUTPATIENT
Start: 2024-12-17

## 2024-12-17 NOTE — TELEPHONE ENCOUNTER
----- Message from Nancy RODRIGUEZ sent at 12/16/2024 11:25 AM EST -----  Pt informed and stated understanding.     Pt states that she is unsure if she has ever had the screenings but does not believe that she has them.   Patient states that she had gestational diabetes with her previous pregnancy. She states that she does not have time to complete the test any time soon and would like to know if there is a need for having it done soon or if it can wait.

## 2024-12-17 NOTE — TELEPHONE ENCOUNTER
I would recommend she start checking her blood sugars now and we get her in with endocrinology if she does not want to do the 3 hour test.     Also, I was able to find in her chart she had a hemoglobin electrophoresis done in 2022 and it was normal.     Orders Placed This Encounter   Procedures    Ambulatory Referral to Diabetic Education     Referral Priority:   Routine     Referral Type:   Health Education     Referral Reason:   Specialty Services Required     Requested Specialty:   Dietitian     Number of Visits Requested:   1    Ambulatory Referral to Endocrinology     Referral Priority:   Routine     Referral Type:   Consultation     Referral Reason:   Specialty Services Required     Requested Specialty:   Endocrinology     Number of Visits Requested:   1     New Medications Ordered This Visit   Medications    glucose blood test strip     Sig: Please take your blood sugar fasting (goal <95) and 2 hours after each meal (goal <120) and record.     Dispense:  120 each     Refill:  12    Lancets (freestyle) lancets     Sig: Please take your blood sugar fasting (goal <95) and 2 hours after each meal (goal <120) and record.     Dispense:  120 each     Refill:  12    glucose monitor monitoring kit     Sig: Use 1 each As Needed (see below). Please check your sugar fasting (goal <95) and 2 hours after each meal (goal <120)     Dispense:  1 each     Refill:  0       Thanks, Tootie Maher MD

## 2024-12-18 ENCOUNTER — ROUTINE PRENATAL (OUTPATIENT)
Dept: OBSTETRICS AND GYNECOLOGY | Facility: CLINIC | Age: 34
End: 2024-12-18
Payer: COMMERCIAL

## 2024-12-18 VITALS — DIASTOLIC BLOOD PRESSURE: 70 MMHG | WEIGHT: 191 LBS | SYSTOLIC BLOOD PRESSURE: 118 MMHG | BODY MASS INDEX: 32.79 KG/M2

## 2024-12-18 DIAGNOSIS — O99.810 ABNORMAL O'SULLIVAN GLUCOSE CHALLENGE TEST, ANTEPARTUM: ICD-10-CM

## 2024-12-18 DIAGNOSIS — Z98.891 HISTORY OF CESAREAN DELIVERY: ICD-10-CM

## 2024-12-18 DIAGNOSIS — O09.92 HIGH-RISK PREGNANCY IN SECOND TRIMESTER: Primary | ICD-10-CM

## 2024-12-18 DIAGNOSIS — O99.019 MATERNAL ANEMIA IN PREGNANCY, ANTEPARTUM: ICD-10-CM

## 2024-12-18 DIAGNOSIS — O09.521 MULTIGRAVIDA OF ADVANCED MATERNAL AGE IN FIRST TRIMESTER: ICD-10-CM

## 2024-12-18 DIAGNOSIS — Z86.32 HISTORY OF GESTATIONAL DIABETES IN PRIOR PREGNANCY, CURRENTLY PREGNANT: ICD-10-CM

## 2024-12-18 DIAGNOSIS — O09.299 HISTORY OF GESTATIONAL DIABETES IN PRIOR PREGNANCY, CURRENTLY PREGNANT: ICD-10-CM

## 2024-12-18 NOTE — PROGRESS NOTES
Chief Complaint   Patient presents with    Routine Prenatal Visit       HPI  Aye is a  currently at 14w6d who today reports the following:  Nausea - improved as compared to the prior visit; Vaginal bleeding -  No; Heartburn - No.  She reports some difficulty in taking her iron and prenatal vitamins- they both still trigger nausea. Encouraged her to take them separately and at least try to take 1 each day at minimum.   She has been trying to get in with endocrinology RE: GDM- but she does have a glucometer and supplies at home and has been trying to make an effort to decrease carbohydrate intake and be more mindful of her diet.     Review of Systems   Constitutional: Negative.    Gastrointestinal: Negative.    Neurological: Negative.    Psychiatric/Behavioral: Negative.          Objective  /70   Wt 86.6 kg (191 lb)   LMP 2024   BMI 32.79 kg/m²     Physical Exam  Vitals and nursing note reviewed.   Psychiatric:         Mood and Affect: Mood normal.         Behavior: Behavior normal.         Thought Content: Thought content normal.         Judgment: Judgment normal.         Prenatal Labs  Lab Results   Component Value Date    HGB 10.8 (L) 2024    RUBELLAABIGG 1.36 2024    HEPBSAG Non-Reactive 2024    ABSCRN Negative 2024    SIR9RYJ3 Non-Reactive 2024    HEPCVIRUSABY Non-Reactive 2024     (H) 2024    GGTFASTING 107 (H) 2023    EJC4EJWQ 205 (H) 2023    TCU1ZZNU 148 2023    GKV9TNWC 112 2023    STREPGPB negative 2024    STREPGPB error 2024    URINECX >100,000 CFU/mL Mixed Mera Isolated 2024    CHLAMNAA negative 2024    NGONORRHON negative 2024       Assessment and Plan  Diagnoses and all orders for this visit:    1. High-risk pregnancy in second trimester (Primary)    2. History of  delivery    3. Multigravida of advanced maternal age in first trimester    4. History of gestational  diabetes in prior pregnancy, currently pregnant    5. Maternal anemia in pregnancy, antepartum    6. Abnormal O'Dumont glucose challenge test, antepartum        Continue with PNV's  Prenatal labs reviewed  No additional counseling given - she has no specific complaints or concerns  Tests scheduled today for her next visit U/S for anatomic screening    Return for Next scheduled follow up.    Shantel Sánchez, APRN  December 18, 2024

## 2024-12-20 ENCOUNTER — DOCUMENTATION (OUTPATIENT)
Dept: OBSTETRICS AND GYNECOLOGY | Facility: CLINIC | Age: 34
End: 2024-12-20
Payer: COMMERCIAL

## 2025-01-23 ENCOUNTER — ROUTINE PRENATAL (OUTPATIENT)
Dept: OBSTETRICS AND GYNECOLOGY | Facility: CLINIC | Age: 35
End: 2025-01-23
Payer: COMMERCIAL

## 2025-01-23 ENCOUNTER — OFFICE VISIT (OUTPATIENT)
Dept: OBSTETRICS AND GYNECOLOGY | Facility: HOSPITAL | Age: 35
End: 2025-01-23
Payer: COMMERCIAL

## 2025-01-23 ENCOUNTER — HOSPITAL ENCOUNTER (OUTPATIENT)
Dept: WOMENS IMAGING | Facility: HOSPITAL | Age: 35
Discharge: HOME | End: 2025-01-23
Admitting: OBSTETRICS & GYNECOLOGY
Payer: COMMERCIAL

## 2025-01-23 VITALS — SYSTOLIC BLOOD PRESSURE: 113 MMHG | BODY MASS INDEX: 32.1 KG/M2 | WEIGHT: 187 LBS | DIASTOLIC BLOOD PRESSURE: 63 MMHG

## 2025-01-23 VITALS — BODY MASS INDEX: 32.1 KG/M2 | WEIGHT: 187 LBS

## 2025-01-23 DIAGNOSIS — O09.529 ANTEPARTUM MULTIGRAVIDA OF ADVANCED MATERNAL AGE: Primary | ICD-10-CM

## 2025-01-23 DIAGNOSIS — O09.299 HISTORY OF GESTATIONAL DIABETES IN PRIOR PREGNANCY, CURRENTLY PREGNANT: ICD-10-CM

## 2025-01-23 DIAGNOSIS — O09.91 HIGH-RISK PREGNANCY IN FIRST TRIMESTER: ICD-10-CM

## 2025-01-23 DIAGNOSIS — O99.019 MATERNAL ANEMIA IN PREGNANCY, ANTEPARTUM: ICD-10-CM

## 2025-01-23 DIAGNOSIS — Z86.32 HISTORY OF GESTATIONAL DIABETES IN PRIOR PREGNANCY, CURRENTLY PREGNANT: ICD-10-CM

## 2025-01-23 DIAGNOSIS — O99.810 ABNORMAL O'SULLIVAN GLUCOSE CHALLENGE TEST, ANTEPARTUM: ICD-10-CM

## 2025-01-23 DIAGNOSIS — Z98.891 HISTORY OF CESAREAN DELIVERY: ICD-10-CM

## 2025-01-23 DIAGNOSIS — O09.521 MULTIGRAVIDA OF ADVANCED MATERNAL AGE IN FIRST TRIMESTER: ICD-10-CM

## 2025-01-23 DIAGNOSIS — O09.529 ANTEPARTUM MULTIGRAVIDA OF ADVANCED MATERNAL AGE: ICD-10-CM

## 2025-01-23 DIAGNOSIS — Z34.90 PREGNANCY, UNSPECIFIED GESTATIONAL AGE: ICD-10-CM

## 2025-01-23 DIAGNOSIS — O09.92 HIGH-RISK PREGNANCY IN SECOND TRIMESTER: Primary | ICD-10-CM

## 2025-01-23 PROCEDURE — 76811 OB US DETAILED SNGL FETUS: CPT

## 2025-01-23 NOTE — PROGRESS NOTES
Patient denies any leaking fluid, vaginal bleeding, or contractions.  NIPT low risk.  Patient reports next follow-up appointment with Dr. Maher's office is today.

## 2025-01-23 NOTE — LETTER
2025     Tootie Maher MD  [address] [facility]  [address] [place] [address]    Patient: Aye Nance   YOB: 1990   Date of Visit: 2025     Dear Tootie Maher MD:       Thank you for referring Aye Nance to me for evaluation. Below are the relevant portions of my assessment and plan of care.    If you have questions, please do not hesitate to call me. I look forward to following Aye along with you.         Sincerely,        Douglas A. Milligan, MD        CC: No Recipients    Milligan, Douglas A, MD  25 [phone]  Sign when Signing Visit  Documentation of the ultrasound findings, images, and interpretations will be available in the patient's Viewpoint report which is located in the imaging tab in chart review.    Maternal/Fetal Medicine Consult Note     Name: Aye Nance    : 1990     MRN: [id number]     Referring Provider: Tootie Maher MD    Chief Complaint  AMA; Hx GDM; Prev c/s x 2    Subjective     History of Present Illness:  Aye Nance is a 34 y.o.  20w0d who presents today for AMA    SHAAN: Estimated Date of Delivery: 25     ROS:   As noted in HPI.     Past Medical History:   Diagnosis Date   • Anemia    • Diabetes    • Gestational diabetes on insulin in third trimester       Past Surgical History:   Procedure Laterality Date   •  SECTION  2014   •  SECTION N/A 2023    Procedure:  SECTION REPEAT;  Surgeon: Tootie Maher MD;  Location: [facility] LABOR DELIVERY;  Service: Obstetrics/Gynecology;  Laterality: N/A;   • WISDOM TOOTH EXTRACTION        OB History          3    Para   2    Term   2       0    AB   0    Living   2         SAB   0    IAB   0    Ectopic   0    Molar   0    Multiple   0    Live Births   2          Obstetric Comments   Fob #1 - Pregnancy #1  Fob #2- Pregnancy #2 - NIPT Wnl male   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    Weight as of this encounter: 84.8 kg (187 lb).    Physical Exam    Ultrasound Impression:   See Viewpoint    Assessment and Plan     Aye Nance is a 34 y.o.  20w0d who presents today for AMA    Diagnoses and all orders for this visit:    1. Antepartum multigravida of advanced maternal age (Primary)  Assessment & Plan:  The patient will be 34 years old at the time of delivery.  She was quoted the following age-related risks at term:  Risk for Down syndrome 1 in 300, risk for any chromosomal abnormality 1 in 150.  Options in genetic testing and genetic screening were discussed with the patient.  I noted an option of genetic testing in the 2nd trimester of transabdominal amniocentesis for the determination of fetal chromosomal complement as well as amniotic fluid alpha-fetoprotein for the evaluation of a fetal open neural tube defect.  A procedure-related fetal loss rate of 1 in 500 would be quoted with midtrimester amniocentesis.  I also discussed the option of analysis of cell-free fetal DNA in maternal blood.  I noted this directed analysis measures the relative proportion of chromosomes with the detection rate of fetal Down syndrome quoted as 99% with a false positive rate of less than .1%.  Screening for other fetal aneuploidies is also possible but the detection rate is lower with cell-free fetal DNA technology.  I then discussed the clinical utility of ultrasound and/or biochemical screening for the detection of fetal aneuploidy as well as fetal open neural tube defects.  Further, I have reviewed her self-reported family history and made suggestions as appropriate based on my review.      Patient is already had a cell free DNA screen that returned as low risk.  We discussed that this " significantly lowers the risk of chromosomal abnormalities but does not eliminate risk.  Amniocentesis was again discussed.  The procedure was explained and the risks including risk of pregnancy loss were outlined.  After careful consideration the patient declines amniocentesis.    Orders:  -     [facility] [place]; Future    2. History of  delivery  -     [facility] [place]; Future    3. History of gestational diabetes in prior pregnancy, currently pregnant  -     [facility]; Future    4. Abnormal O'Dumont glucose challenge test, antepartum  Assessment & Plan:  Patient referred today for pregnancy complicated by advanced maternal age.  Additionally the patient had gestational diabetes and a previous pregnancy.  Her 1 hour glucose tolerance test was abnormal and she has a 3-hour test to diagnose diabetes scheduled.    Given the patient's history Cristin and her 1 hour screen most likely she is gestationally diabetic again as the patient is testing positive early in pregnancy we will treat this as though it is a type 2 diabetes..  We will rescan the patient again in 4 weeks time perform a fetal echocardiogram.    Orders:  -     [facility] [place]; Future    5. Pregnancy, unspecified gestational age  -     [facility]; Future         Follow Up  Return in about 4 weeks (around 2025).    I spent 25 minutes caring for the patient on the day of service. This included: obtaining or reviewing a separately obtained medical history, reviewing patient records, performing a medically appropriate exam and/or evaluation, counseling or educating the patient/family/caregiver, ordering medications, labs, and/or procedures and documenting such in the medical record. This does not include time spent on review and interpretation of other tests such as fetal ultrasound or the performance of other procedures such as amniocentesis or  CVS.      Douglas A. Milligan, MD  Maternal Fetal Medicine, [facility]    Diagnostic Center     2025

## 2025-01-23 NOTE — ASSESSMENT & PLAN NOTE
Patient referred today for pregnancy complicated by advanced maternal age.  Additionally the patient had gestational diabetes and a previous pregnancy.  Her 1 hour glucose tolerance test was abnormal and she has a 3-hour test to diagnose diabetes scheduled.    Given the patient's history Cristin and her 1 hour screen most likely she is gestationally diabetic again as the patient is testing positive early in pregnancy we will treat this as though it is a type 2 diabetes..  We will rescan the patient again in 4 weeks time perform a fetal echocardiogram.

## 2025-01-23 NOTE — PROGRESS NOTES
"Documentation of the ultrasound findings, images, and interpretations will be available in the patient's Viewpoint report which is located in the imaging tab in chart review.    Maternal/Fetal Medicine Consult Note     Name: Aye Nance    : 1990     MRN: [id number]     Referring Provider: Tootie Maher MD    Chief Complaint  AMA; Hx GDM; Prev c/s x 2    Subjective     History of Present Illness:  Aye Nance is a 34 y.o.  20w0d who presents today for AMA    SHAAN: Estimated Date of Delivery: 25     ROS:   As noted in HPI.     Past Medical History:   Diagnosis Date    Anemia     Diabetes     Gestational diabetes on insulin in third trimester       Past Surgical History:   Procedure Laterality Date     SECTION  2014     SECTION N/A 2023    Procedure:  SECTION REPEAT;  Surgeon: Tootie Maher MD;  Location: [facility] LABOR DELIVERY;  Service: Obstetrics/Gynecology;  Laterality: N/A;    WISDOM TOOTH EXTRACTION        OB History          3    Para   2    Term   2       0    AB   0    Living   2         SAB   0    IAB   0    Ectopic   0    Molar   0    Multiple   0    Live Births   2          Obstetric Comments   Fob #1 - Pregnancy #1  Fob #2- Pregnancy #2 - NIPT Wnl male   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    Weight as of this encounter: 84.8 kg (187 lb).    Physical Exam    Ultrasound Impression:   See Viewpoint    Assessment and Plan     Aye Nance is a 34 y.o.  20w0d who presents today for AMA    Diagnoses and all orders for this visit:    1. Antepartum multigravida of advanced maternal age (Primary)  Assessment & Plan:  The patient will be 34 years old at the time of delivery.  She " was quoted the following age-related risks at term:  Risk for Down syndrome 1 in 300, risk for any chromosomal abnormality 1 in 150.  Options in genetic testing and genetic screening were discussed with the patient.  I noted an option of genetic testing in the 2nd trimester of transabdominal amniocentesis for the determination of fetal chromosomal complement as well as amniotic fluid alpha-fetoprotein for the evaluation of a fetal open neural tube defect.  A procedure-related fetal loss rate of 1 in 500 would be quoted with midtrimester amniocentesis.  I also discussed the option of analysis of cell-free fetal DNA in maternal blood.  I noted this directed analysis measures the relative proportion of chromosomes with the detection rate of fetal Down syndrome quoted as 99% with a false positive rate of less than .1%.  Screening for other fetal aneuploidies is also possible but the detection rate is lower with cell-free fetal DNA technology.  I then discussed the clinical utility of ultrasound and/or biochemical screening for the detection of fetal aneuploidy as well as fetal open neural tube defects.  Further, I have reviewed her self-reported family history and made suggestions as appropriate based on my review.      Patient is already had a cell free DNA screen that returned as low risk.  We discussed that this significantly lowers the risk of chromosomal abnormalities but does not eliminate risk.  Amniocentesis was again discussed.  The procedure was explained and the risks including risk of pregnancy loss were outlined.  After careful consideration the patient declines amniocentesis.    Orders:  -     [facility]; Future    2. History of  delivery  -     [facility]; Future    3. History of gestational diabetes in prior pregnancy, currently pregnant  -     [facility]; Future    4. Abnormal [facility] glucose challenge test,  antepartum  Assessment & Plan:  Patient referred today for pregnancy complicated by advanced maternal age.  Additionally the patient had gestational diabetes and a previous pregnancy.  Her 1 hour glucose tolerance test was abnormal and she has a 3-hour test to diagnose diabetes scheduled.    Given the patient's history Cristin and her 1 hour screen most likely she is gestationally diabetic again as the patient is testing positive early in pregnancy we will treat this as though it is a type 2 diabetes..  We will rescan the patient again in 4 weeks time perform a fetal echocardiogram.    Orders:  -     [facility]; Future    5. Pregnancy, unspecified gestational age  -     [facility]; Future         Follow Up  Return in about 4 weeks (around 2025).    I spent 25 minutes caring for the patient on the day of service. This included: obtaining or reviewing a separately obtained medical history, reviewing patient records, performing a medically appropriate exam and/or evaluation, counseling or educating the patient/family/caregiver, ordering medications, labs, and/or procedures and documenting such in the medical record. This does not include time spent on review and interpretation of other tests such as fetal ultrasound or the performance of other procedures such as amniocentesis or CVS.      Douglas A. Milligan, MD  Maternal Fetal Medicine, [facility]     2025

## 2025-01-23 NOTE — PROGRESS NOTES
Chief Complaint   Patient presents with    Routine Prenatal Visit     US done today at [facility]       HPI: Aye is a  currently at 20w0d who today reports the following:  Contractions - No; Leaking - No; Vaginal bleeding -  No; Heartburn - No.    She states she does not want to do the 3 hour GTT even though she failed her early 1 hour GCT. She also declines diabetic education referral since she has done this before.     ROS:  GI: Nausea - No ; Constipation - No; Diarrhea - No    Neuro: Headache - No ; Visual change - No      EXAM:  Vitals: See prenatal flowsheet   Abdomen: See prenatal flowsheet   Urine glucose/protein: See prenatal flowsheet   Pelvic: See prenatal flowsheet     Lab Results   Component Value Date    ABO O 2024    RH Positive 2024    ABSCRN Negative 2024       MDM:  Impression: Supervision of high risk pregnancy  Previous C/S with planned repeat C/S  AMA - cfDNA normal  Anemia in pregnancy  Elevated 1 hour GCT - declines 3 hour GTT - most likely presumed T2DM   Tests done today: U/S for anatomic screening - anatomy completely seen today   Topics discussed: Continue with PNV's  Prenatal labs reviewed  Scheduled repeat c/s. She is unsure of bilateral salpingectomy at this time.   Given she declines 3 hour GTT recommend checking blood sugars and brining those to her next visit. She states she already has the supplies at home for this. Will my chart message patient and recommend she start 81 mg ASA for preeclampsia prevention.    Tests scheduled today for her next visit:   None

## 2025-01-23 NOTE — ASSESSMENT & PLAN NOTE
The patient will be 34 years old at the time of delivery.  She was quoted the following age-related risks at term:  Risk for Down syndrome 1 in 300, risk for any chromosomal abnormality 1 in 150.  Options in genetic testing and genetic screening were discussed with the patient.  I noted an option of genetic testing in the 2nd trimester of transabdominal amniocentesis for the determination of fetal chromosomal complement as well as amniotic fluid alpha-fetoprotein for the evaluation of a fetal open neural tube defect.  A procedure-related fetal loss rate of 1 in 500 would be quoted with midtrimester amniocentesis.  I also discussed the option of analysis of cell-free fetal DNA in maternal blood.  I noted this directed analysis measures the relative proportion of chromosomes with the detection rate of fetal Down syndrome quoted as 99% with a false positive rate of less than .1%.  Screening for other fetal aneuploidies is also possible but the detection rate is lower with cell-free fetal DNA technology.  I then discussed the clinical utility of ultrasound and/or biochemical screening for the detection of fetal aneuploidy as well as fetal open neural tube defects.  Further, I have reviewed her self-reported family history and made suggestions as appropriate based on my review.      Patient is already had a cell free DNA screen that returned as low risk.  We discussed that this significantly lowers the risk of chromosomal abnormalities but does not eliminate risk.  Amniocentesis was again discussed.  The procedure was explained and the risks including risk of pregnancy loss were outlined.  After careful consideration the patient declines amniocentesis.

## 2025-01-27 ENCOUNTER — TELEPHONE (OUTPATIENT)
Dept: OBSTETRICS AND GYNECOLOGY | Facility: CLINIC | Age: 35
End: 2025-01-27
Payer: COMMERCIAL

## 2025-01-27 NOTE — TELEPHONE ENCOUNTER
I sent this message to patient via my chart after her recent visit but she did not read it. Can you make sure it gets communicated to her?    Thanks, Tootie Maher MD          I do recommend starting a 81 mg aspirin for preeclampsia prevention given the elevated glucose test and age > 35 years old. Please let me know if you have any questions or concerns.      Thanks, Dr. Maher

## 2025-02-17 ENCOUNTER — TELEPHONE (OUTPATIENT)
Dept: OBSTETRICS AND GYNECOLOGY | Facility: CLINIC | Age: 35
End: 2025-02-17
Payer: COMMERCIAL

## 2025-02-17 NOTE — TELEPHONE ENCOUNTER
.    Caller: Aye Nance    Relationship to patient: Self    Best call back number: [phone]    Chief complaint: WANTS TIME CLOSER TO [facility] APPT @2:45    Type of visit: OB FOLLOW-UP     Requested date: SAME DATE, DIFF TIME 2/20/25     If rescheduling, when is the original appointment: 2/20/25     Additional notes:PT WOULD LIKE APPT TIME CLOSER TO HER [facility] APPT TIME

## 2025-02-18 NOTE — TELEPHONE ENCOUNTER
Spoke with pt and advised her that we didn't need to see her, but Dr. Maher would like for her to send her the glucose readings through [employer].  Pt verbalized understanding and had no questions at this time.

## 2025-02-20 ENCOUNTER — HOSPITAL ENCOUNTER (OUTPATIENT)
Dept: WOMENS IMAGING | Facility: HOSPITAL | Age: 35
Discharge: HOME | End: 2025-02-20
Admitting: OBSTETRICS & GYNECOLOGY
Payer: COMMERCIAL

## 2025-02-20 ENCOUNTER — OFFICE VISIT (OUTPATIENT)
Dept: OBSTETRICS AND GYNECOLOGY | Facility: HOSPITAL | Age: 35
End: 2025-02-20
Payer: COMMERCIAL

## 2025-02-20 VITALS — SYSTOLIC BLOOD PRESSURE: 110 MMHG | DIASTOLIC BLOOD PRESSURE: 62 MMHG | WEIGHT: 192.6 LBS | BODY MASS INDEX: 33.06 KG/M2

## 2025-02-20 DIAGNOSIS — Z34.90 PREGNANCY, UNSPECIFIED GESTATIONAL AGE: ICD-10-CM

## 2025-02-20 DIAGNOSIS — Z86.32 HISTORY OF GESTATIONAL DIABETES IN PRIOR PREGNANCY, CURRENTLY PREGNANT: Primary | ICD-10-CM

## 2025-02-20 DIAGNOSIS — O99.810 ABNORMAL O'SULLIVAN GLUCOSE CHALLENGE TEST, ANTEPARTUM: ICD-10-CM

## 2025-02-20 DIAGNOSIS — Z86.32 HISTORY OF GESTATIONAL DIABETES IN PRIOR PREGNANCY, CURRENTLY PREGNANT: ICD-10-CM

## 2025-02-20 DIAGNOSIS — O09.299 HISTORY OF GESTATIONAL DIABETES IN PRIOR PREGNANCY, CURRENTLY PREGNANT: Primary | ICD-10-CM

## 2025-02-20 DIAGNOSIS — O09.529 ANTEPARTUM MULTIGRAVIDA OF ADVANCED MATERNAL AGE: ICD-10-CM

## 2025-02-20 DIAGNOSIS — O09.299 HISTORY OF GESTATIONAL DIABETES IN PRIOR PREGNANCY, CURRENTLY PREGNANT: ICD-10-CM

## 2025-02-20 DIAGNOSIS — Z98.891 HISTORY OF CESAREAN DELIVERY: ICD-10-CM

## 2025-02-20 PROCEDURE — 76825 ECHO EXAM OF FETAL HEART: CPT

## 2025-02-20 PROCEDURE — 93325 DOPPLER ECHO COLOR FLOW MAPG: CPT

## 2025-02-20 PROCEDURE — 76816 OB US FOLLOW-UP PER FETUS: CPT

## 2025-02-20 NOTE — PROGRESS NOTES
Patient denies any leaking fluid, vaginal bleeding, or contractions.  NIPT low risk.  Patient reports next follow-up appointment with Dr. Maher's office needs to be scheduled as she cancelled her appointment this morning due to her afternoon appointment here.

## 2025-02-25 NOTE — PROGRESS NOTES
Patient seen in  Diagnostic Center today for fetal ultrasound.    Please see ultrasound report under imaging tab of patient chart in Epic (Viewpoint report).    Keisha White MD

## 2025-03-20 ENCOUNTER — HOSPITAL ENCOUNTER (OUTPATIENT)
Dept: WOMENS IMAGING | Facility: HOSPITAL | Age: 35
Discharge: HOME | End: 2025-03-20
Admitting: OBSTETRICS & GYNECOLOGY
Payer: COMMERCIAL

## 2025-03-20 ENCOUNTER — APPOINTMENT (OUTPATIENT)
Dept: WOMENS IMAGING | Facility: HOSPITAL | Age: 35
End: 2025-03-20
Payer: COMMERCIAL

## 2025-03-20 ENCOUNTER — OFFICE VISIT (OUTPATIENT)
Dept: OBSTETRICS AND GYNECOLOGY | Facility: HOSPITAL | Age: 35
End: 2025-03-20
Payer: COMMERCIAL

## 2025-03-20 VITALS — BODY MASS INDEX: 32.68 KG/M2 | WEIGHT: 190.4 LBS | DIASTOLIC BLOOD PRESSURE: 58 MMHG | SYSTOLIC BLOOD PRESSURE: 114 MMHG

## 2025-03-20 DIAGNOSIS — O09.299 HISTORY OF GESTATIONAL DIABETES IN PRIOR PREGNANCY, CURRENTLY PREGNANT: ICD-10-CM

## 2025-03-20 DIAGNOSIS — Z86.32 HISTORY OF GESTATIONAL DIABETES IN PRIOR PREGNANCY, CURRENTLY PREGNANT: ICD-10-CM

## 2025-03-20 DIAGNOSIS — O09.529 ANTEPARTUM MULTIGRAVIDA OF ADVANCED MATERNAL AGE: ICD-10-CM

## 2025-03-20 DIAGNOSIS — O99.810 ABNORMAL O'SULLIVAN GLUCOSE CHALLENGE TEST, ANTEPARTUM: Primary | ICD-10-CM

## 2025-03-20 PROCEDURE — 76816 OB US FOLLOW-UP PER FETUS: CPT

## 2025-03-20 RX ORDER — ASPIRIN 81 MG/1
81 TABLET, CHEWABLE ORAL DAILY
COMMUNITY

## 2025-03-20 NOTE — PROGRESS NOTES
Patient denies any leaking fluid, vaginal bleeding, or contractions.  NIPT negative.  Patient reports next follow-up appointment with Dr. Maher's office is tomorrow.

## 2025-03-21 ENCOUNTER — ROUTINE PRENATAL (OUTPATIENT)
Dept: OBSTETRICS AND GYNECOLOGY | Facility: CLINIC | Age: 35
End: 2025-03-21
Payer: COMMERCIAL

## 2025-03-21 VITALS — DIASTOLIC BLOOD PRESSURE: 70 MMHG | BODY MASS INDEX: 32.79 KG/M2 | WEIGHT: 191 LBS | SYSTOLIC BLOOD PRESSURE: 110 MMHG

## 2025-03-21 DIAGNOSIS — O09.299 HISTORY OF GESTATIONAL DIABETES IN PRIOR PREGNANCY, CURRENTLY PREGNANT: ICD-10-CM

## 2025-03-21 DIAGNOSIS — O99.019 MATERNAL ANEMIA IN PREGNANCY, ANTEPARTUM: ICD-10-CM

## 2025-03-21 DIAGNOSIS — O09.93 HIGH-RISK PREGNANCY IN THIRD TRIMESTER: Primary | ICD-10-CM

## 2025-03-21 DIAGNOSIS — Z86.32 HISTORY OF GESTATIONAL DIABETES IN PRIOR PREGNANCY, CURRENTLY PREGNANT: ICD-10-CM

## 2025-03-21 DIAGNOSIS — O99.810 ABNORMAL O'SULLIVAN GLUCOSE CHALLENGE TEST, ANTEPARTUM: ICD-10-CM

## 2025-03-21 DIAGNOSIS — O09.529 ANTEPARTUM MULTIGRAVIDA OF ADVANCED MATERNAL AGE: ICD-10-CM

## 2025-03-21 DIAGNOSIS — Z98.891 HISTORY OF CESAREAN DELIVERY: ICD-10-CM

## 2025-03-21 LAB
GLUCOSE UR STRIP-MCNC: ABNORMAL MG/DL
PROT UR STRIP-MCNC: NEGATIVE MG/DL

## 2025-03-21 PROCEDURE — 0502F SUBSEQUENT PRENATAL CARE: CPT | Performed by: OBSTETRICS & GYNECOLOGY

## 2025-03-21 NOTE — PROGRESS NOTES
Chief Complaint   Patient presents with    Routine Prenatal Visit     No c/c       HPI: Aye is a  currently at 28w1d who today reports the following:  Contractions - No; Leaking - No; Vaginal bleeding -  No; Heartburn - No.    She has glucose values with her today but has only been checking them fasting. The were elevated in the 100-110s but with diet change and more exercise her more recent values have been 90s to low 100s. She was on insulin during her last pregnancy. She had an elevated 1 hour GCT this pregnancy and declined to do the 3 hour GTT.     ROS:  GI: Nausea - No ; Constipation - No; Diarrhea - No    Neuro: Headache - No ; Visual change - No      EXAM:  Vitals: See prenatal flowsheet   Abdomen: See prenatal flowsheet   Urine glucose/protein: See prenatal flowsheet   Pelvic: See prenatal flowsheet     Lab Results   Component Value Date    ABO O 2024    RH Positive 2024    ABSCRN Negative 2024       MDM:  Impression: Supervision of high risk pregnancy  Previous C/S with planned repeat C/S at 39+ weeks (she is considering a bilateral salpingectomy but is unsure at this time)  AMA - cfDNA normal  Anemia in pregnancy  Elevated 1 hour GCT - declines 3 hour GTT - most likely presumed T2DM - limited fasting values for review today    Tests done today: Urine dip for protein and glucose  U/S done at [facility] for follow up done yesterday - report not available for review but per patient normal.    Topics discussed: Continue with PNV's  Prenatal labs reviewed   labor signs and symptoms  Symptoms of preeclampsia  Reviewed EFW q4 weeks.   Recommend she take her blood sugar fasting and 2 hours after each meal over the next ~ 5 days and then message me back with values and if still having elevated values will need to get her in with MFM again to potentially start insulin. She verbalized understanding regarding plan of care.    Tests scheduled today for her next visit:   Tdap

## 2025-04-02 ENCOUNTER — TELEPHONE (OUTPATIENT)
Dept: OBSTETRICS AND GYNECOLOGY | Facility: HOSPITAL | Age: 35
End: 2025-04-02

## 2025-04-02 ENCOUNTER — MEDICATION THERAPY MANAGEMENT (OUTPATIENT)
Dept: OBSTETRICS AND GYNECOLOGY | Facility: HOSPITAL | Age: 35
End: 2025-04-02

## 2025-04-02 DIAGNOSIS — O09.299 HISTORY OF GESTATIONAL DIABETES IN PRIOR PREGNANCY, CURRENTLY PREGNANT: Primary | ICD-10-CM

## 2025-04-02 DIAGNOSIS — Z86.32 HISTORY OF GESTATIONAL DIABETES IN PRIOR PREGNANCY, CURRENTLY PREGNANT: Primary | ICD-10-CM

## 2025-04-02 RX ORDER — PEN NEEDLE, DIABETIC 30 GX3/16"
1 NEEDLE, DISPOSABLE MISCELLANEOUS 4 TIMES DAILY
Qty: 100 EACH | Refills: 2 | Status: SHIPPED | OUTPATIENT
Start: 2025-04-02

## 2025-04-02 RX ORDER — INSULIN GLARGINE 100 [IU]/ML
10 INJECTION, SOLUTION SUBCUTANEOUS NIGHTLY
Qty: 3 ML | Refills: 2 | Status: SHIPPED | OUTPATIENT
Start: 2025-04-02

## 2025-04-02 RX ORDER — ACYCLOVIR 400 MG/1
1 TABLET ORAL
Qty: 3 EACH | Refills: 6 | Status: SHIPPED | OUTPATIENT
Start: 2025-04-02

## 2025-04-02 NOTE — TELEPHONE ENCOUNTER
"Spoke with patient over the phone.  Informed patient that Dr. Maher had referred patient to us regarding her GDM.  Dr. Villaseñor is recommending she start on medication.  Explained she could take Metformin or insulin.  Explained pros and cons of each.  Patient states she is not taking any pills and would prefer the insulin.  Patient states \"I have an allergy to Levemir and I was able to take Basaglar last time without difficulty\"  Reassured patient would not order Levemir.  Patient is also interested in a Dexcom if her insurance will pay for it.  Educated patient that a prior authorization would most likely be required and the pharmacy would let us know once it is approved if there is any copay then she would have the option of using the Dexcom or continue using a glucometer.  Patient is in agreement.  Confirmed pharmacy with patient as [facility] on [address].  Carmela Petty RN  " Ivermectin Counseling:  Patient instructed to take medication on an empty stomach with a full glass of water.  Patient informed of potential adverse effects including but not limited to nausea, diarrhea, dizziness, itching, and swelling of the extremities or lymph nodes.  The patient verbalized understanding of the proper use and possible adverse effects of ivermectin.  All of the patient's questions and concerns were addressed.

## 2025-04-17 ENCOUNTER — ROUTINE PRENATAL (OUTPATIENT)
Dept: OBSTETRICS AND GYNECOLOGY | Facility: CLINIC | Age: 35
End: 2025-04-17
Payer: COMMERCIAL

## 2025-04-17 VITALS — DIASTOLIC BLOOD PRESSURE: 60 MMHG | WEIGHT: 188 LBS | BODY MASS INDEX: 32.27 KG/M2 | SYSTOLIC BLOOD PRESSURE: 100 MMHG

## 2025-04-17 DIAGNOSIS — O09.299 HISTORY OF GESTATIONAL DIABETES IN PRIOR PREGNANCY, CURRENTLY PREGNANT: ICD-10-CM

## 2025-04-17 DIAGNOSIS — Z98.891 HISTORY OF CESAREAN DELIVERY: ICD-10-CM

## 2025-04-17 DIAGNOSIS — O24.419 GESTATIONAL DIABETES MELLITUS, CLASS A2: ICD-10-CM

## 2025-04-17 DIAGNOSIS — O99.810 ABNORMAL O'SULLIVAN GLUCOSE CHALLENGE TEST, ANTEPARTUM: ICD-10-CM

## 2025-04-17 DIAGNOSIS — Z86.32 HISTORY OF GESTATIONAL DIABETES IN PRIOR PREGNANCY, CURRENTLY PREGNANT: ICD-10-CM

## 2025-04-17 DIAGNOSIS — O99.019 MATERNAL ANEMIA IN PREGNANCY, ANTEPARTUM: ICD-10-CM

## 2025-04-17 DIAGNOSIS — Z3A.32 32 WEEKS GESTATION OF PREGNANCY: ICD-10-CM

## 2025-04-17 DIAGNOSIS — O09.93 HIGH-RISK PREGNANCY IN THIRD TRIMESTER: Primary | ICD-10-CM

## 2025-04-17 DIAGNOSIS — O09.529 ANTEPARTUM MULTIGRAVIDA OF ADVANCED MATERNAL AGE: ICD-10-CM

## 2025-04-17 LAB
GLUCOSE UR STRIP-MCNC: NEGATIVE MG/DL
PROT UR STRIP-MCNC: ABNORMAL MG/DL

## 2025-04-17 NOTE — PROGRESS NOTES
Chief Complaint   Patient presents with    Routine Prenatal Visit     No c/c       HPI: Aye is a  currently at 32w0d who today reports the following:  Contractions - No; Leaking - No; Vaginal bleeding -  No; Swelling of extremities - No.    She reports she was checking blood sugars for a while but they were moving and she misplaced her glucometer.  She reports that she has not heard about her Dexcom and was unaware of needing to be on insulin.  She reports she had a fasting of 114 this morning but prior to this she had not checked her blood sugar in over a week.    ROS:  GI: Nausea - No; Constipation - No; Diarrhea - No    Neuro: Headache - No; Visual change - No      EXAM:  Vitals: See prenatal flowsheet   Abdomen: See prenatal flowsheet   Urine glucose/protein: See prenatal flowsheet   Pelvic: See prenatal flowsheet   MDM:   Impression: Supervision of high risk pregnancy  Previous C/S with planned repeat C/S at 39+ weeks (she is considering a bilateral salpingectomy but is unsure at this time)  AMA - cfDNA normal  Anemia in pregnancy  Elevated 1 hour GCT - declines 3 hour GTT - most likely presumed T2DM - limited values to review - has scheduled appointment with PD[facility] tomorrow    Tests done today: Urine dip for protein and glucose   Topics discussed: Continue with PNV's  Prenatal labs reviewed   labor signs and symptoms  Symptoms of preeclampsia  Reviewed to go  supplies and insulin from [facility] today.  She verbalized understanding.  Recommend that she keep scheduled follow-up with [facility] tomorrow and that we will most likely have to do at the very least weekly  testing starting now.  Reviewed keeping scheduled repeat  at 39 weeks.  Reviewed this may need to Depp based off of insulin needs and glucose values.   Tests scheduled today for her next visit:   U/S for [facility] follow up tomorrow for EFW and BPP and management of DM

## 2025-04-17 NOTE — Clinical Note
Just fyi she has not been checking sugars and did not  insulin as prescribed.  I told her to go to [facility] today to  everything and keep her scheduled follow-up with you all tomorrow.  Thanks for all of your help  Thanks, Tootie Nino

## 2025-04-18 ENCOUNTER — HOSPITAL ENCOUNTER (OUTPATIENT)
Dept: WOMENS IMAGING | Facility: HOSPITAL | Age: 35
Discharge: HOME | End: 2025-04-18
Admitting: OBSTETRICS & GYNECOLOGY
Payer: COMMERCIAL

## 2025-04-18 ENCOUNTER — OFFICE VISIT (OUTPATIENT)
Dept: OBSTETRICS AND GYNECOLOGY | Facility: HOSPITAL | Age: 35
End: 2025-04-18
Payer: COMMERCIAL

## 2025-04-18 VITALS — DIASTOLIC BLOOD PRESSURE: 54 MMHG | WEIGHT: 189 LBS | SYSTOLIC BLOOD PRESSURE: 101 MMHG | BODY MASS INDEX: 32.44 KG/M2

## 2025-04-18 DIAGNOSIS — Z3A.32 32 WEEKS GESTATION OF PREGNANCY: ICD-10-CM

## 2025-04-18 DIAGNOSIS — O24.419 GESTATIONAL DIABETES MELLITUS (GDM), ANTEPARTUM, GESTATIONAL DIABETES METHOD OF CONTROL UNSPECIFIED: ICD-10-CM

## 2025-04-18 DIAGNOSIS — O09.529 ANTEPARTUM MULTIGRAVIDA OF ADVANCED MATERNAL AGE: Primary | ICD-10-CM

## 2025-04-18 DIAGNOSIS — O99.810 ABNORMAL O'SULLIVAN GLUCOSE CHALLENGE TEST, ANTEPARTUM: ICD-10-CM

## 2025-04-18 DIAGNOSIS — O09.529 ANTEPARTUM MULTIGRAVIDA OF ADVANCED MATERNAL AGE: ICD-10-CM

## 2025-04-18 DIAGNOSIS — Z98.891 HISTORY OF CESAREAN DELIVERY: ICD-10-CM

## 2025-04-18 PROCEDURE — 76819 FETAL BIOPHYS PROFIL W/O NST: CPT

## 2025-04-18 PROCEDURE — 76816 OB US FOLLOW-UP PER FETUS: CPT

## 2025-04-18 NOTE — PROGRESS NOTES
Patient denies any leaking of fluid or vaginal bleeding. She reports brenda sterling contractions.  NIPT low risk.  Patient reports next follow-up appointment with Dr. Maher's office is 4/25.

## 2025-04-21 ENCOUNTER — TELEPHONE (OUTPATIENT)
Dept: OBSTETRICS AND GYNECOLOGY | Facility: HOSPITAL | Age: 35
End: 2025-04-21
Payer: COMMERCIAL

## 2025-04-21 NOTE — TELEPHONE ENCOUNTER
Attempted to reach patient by phone.  Left message stating a detailed "[employer] message is being sent and to please respond to that.  If she has questions, please call the office at [phone].  Carmela Petty RN

## 2025-04-23 NOTE — PROGRESS NOTES
Patient seen in Maternal Fetal Medicine clinic today. Please see full note in under imaging tab of patient chart in Epic (Viewpoint report).    Humaira Villaseñor MD

## 2025-04-25 ENCOUNTER — ROUTINE PRENATAL (OUTPATIENT)
Dept: OBSTETRICS AND GYNECOLOGY | Facility: CLINIC | Age: 35
End: 2025-04-25
Payer: COMMERCIAL

## 2025-04-25 VITALS — BODY MASS INDEX: 32.51 KG/M2 | SYSTOLIC BLOOD PRESSURE: 100 MMHG | DIASTOLIC BLOOD PRESSURE: 60 MMHG | WEIGHT: 189.4 LBS

## 2025-04-25 DIAGNOSIS — Z3A.33 33 WEEKS GESTATION OF PREGNANCY: Primary | ICD-10-CM

## 2025-04-25 DIAGNOSIS — O09.93 HIGH-RISK PREGNANCY IN THIRD TRIMESTER: ICD-10-CM

## 2025-04-25 DIAGNOSIS — O09.529 ANTEPARTUM MULTIGRAVIDA OF ADVANCED MATERNAL AGE: ICD-10-CM

## 2025-04-25 DIAGNOSIS — Z98.891 HISTORY OF CESAREAN DELIVERY: ICD-10-CM

## 2025-04-25 DIAGNOSIS — O24.419 GESTATIONAL DIABETES MELLITUS (GDM), ANTEPARTUM, GESTATIONAL DIABETES METHOD OF CONTROL UNSPECIFIED: ICD-10-CM

## 2025-04-25 LAB
COLOR UR: YELLOW
GLUCOSE UR STRIP-MCNC: NEGATIVE MG/DL
PROT UR STRIP-MCNC: ABNORMAL MG/DL

## 2025-04-25 NOTE — PROGRESS NOTES
Chief Complaint   Patient presents with    Routine Prenatal Visit     NST       HPI: Aye is a  currently at 33w1d who today reports the following: She reports good fetal movement. She has started night time insulin which has improved her fasting sugars. Today her fasting was 92.  Reports her fastings have all been in range. She states she has difficulty making herself eat at times, has not had anything to eat or drink this morning.   Contractions -  reports some brenda sterling on occasion ; Leaking - No; Vaginal bleeding -  No; Swelling of extremities - No.    ROS:  GI: Nausea - No; Constipation - No; Diarrhea - No    Neuro: Headache - No; Visual change - No      EXAM:  Vitals: See prenatal flowsheet   Abdomen: See prenatal flowsheet   Urine glucose/protein: See prenatal flowsheet   Pelvic: See prenatal flowsheet   MDM:   Impression: Supervision of high risk pregnancy  Insulin-dependent gestational diabetic  favor pre-existing type 2 diabetes  AMA - cfDNA normal   Tests done today: Urine dip for protein and glucose  BPP -   NST - nonreactive   Topics discussed: Continue with PNV's  Prenatal labs reviewed   labor signs and symptoms  Kick counts reviewed  Continue current blood sugar and insulin management for [facility] recommendations  Discussed need for adequate hydration and need to avoid prolonged fasting  Discussed recommendation for twice weekly  testing.  She states she is unable to have testing twice a week but is willing to come once a week.   Tests scheduled today for her next visit:   BPP   Non Stress Test      Patient: Aye Nance  : 1990  MRN: [id number]  CSN: [id number]  Date: 2025    Estimated Date of Delivery: 25  Gestational Age: 33w1d    Indication for NST AMA  diabetes mellitus       Total Time on NST > 20 minutes       Interpretation    Baseline  beats per minute    Non reactive    Decelerations Variable: mild       Additional Comments  none       This note has been electronically signed.    Cyndy Ojeda CNM  4/25/2025  11:27 EDT

## 2025-04-28 ENCOUNTER — TELEPHONE (OUTPATIENT)
Dept: OBSTETRICS AND GYNECOLOGY | Facility: HOSPITAL | Age: 35
End: 2025-04-28
Payer: COMMERCIAL

## 2025-04-28 NOTE — TELEPHONE ENCOUNTER
Attempted to reach patient by phone.  Message left requesting patient send in a blood sugar log.  Carmela Petty RN

## 2025-04-30 ENCOUNTER — ROUTINE PRENATAL (OUTPATIENT)
Dept: OBSTETRICS AND GYNECOLOGY | Facility: CLINIC | Age: 35
End: 2025-04-30
Payer: COMMERCIAL

## 2025-04-30 VITALS — WEIGHT: 189 LBS | DIASTOLIC BLOOD PRESSURE: 80 MMHG | SYSTOLIC BLOOD PRESSURE: 110 MMHG | BODY MASS INDEX: 32.44 KG/M2

## 2025-04-30 DIAGNOSIS — O24.419 GESTATIONAL DIABETES MELLITUS, CLASS A2: ICD-10-CM

## 2025-04-30 DIAGNOSIS — O28.8 NON-REACTIVE NST (NON-STRESS TEST): ICD-10-CM

## 2025-04-30 DIAGNOSIS — O09.529 ANTEPARTUM MULTIGRAVIDA OF ADVANCED MATERNAL AGE: ICD-10-CM

## 2025-04-30 DIAGNOSIS — O99.019 MATERNAL ANEMIA IN PREGNANCY, ANTEPARTUM: ICD-10-CM

## 2025-04-30 DIAGNOSIS — Z98.891 HISTORY OF CESAREAN DELIVERY: ICD-10-CM

## 2025-04-30 DIAGNOSIS — O09.93 HIGH-RISK PREGNANCY IN THIRD TRIMESTER: Primary | ICD-10-CM

## 2025-04-30 LAB
GLUCOSE UR STRIP-MCNC: ABNORMAL MG/DL
PROT UR STRIP-MCNC: NEGATIVE MG/DL

## 2025-04-30 NOTE — PROGRESS NOTES
Chief Complaint   Patient presents with    Routine Prenatal Visit     NST started at 0908       HPI: Aye is a  currently at 33w6d who today reports the following:  Contractions - No; Leaking - No; Vaginal bleeding -  No; Swelling of extremities - No.    Her fasting blood sugar was normal this morning. She does admit it has been hard to check her blood sugars consistently due to moving, work and family commitments but she is trying her best. She is currently using 10 units of long acting insulin at night and she is sending values to PD[facility] and Dr. Villaseñor is managing.     ROS:  GI: Nausea - No; Constipation - No; Diarrhea - No    Neuro: Headache - No; Visual change - No      EXAM:  Vitals: See prenatal flowsheet   Abdomen: See prenatal flowsheet   Urine glucose/protein: See prenatal flowsheet   Pelvic: See prenatal flowsheet   MDM:   Impression: Supervision of high risk pregnancy  GDMA2 - favor pre-existing type 2 diabetes  AMA - cfDNA normal  History of  section with planned repeat  section   Maternal anemia   Non-reactive NST   Tests done today: Urine dip for protein and glucose  NST - nonreactive   Topics discussed: Continue with PNV's  Prenatal labs reviewed   labor signs and symptoms  Symptoms of preeclampsia  Continue insulin management per PDC recs.   Continue weekly  testing.   Reviewed the importance of checking blood sugar values especially fasting values.    Tests scheduled today for her next visit:   NST     Non Stress Test      Patient: Aye Nance  : 1990  MRN: [id number]  CSN: [id number]  Date: 2025    Estimated Date of Delivery: 25  Gestational Age: 33w6d    Indication for NST gestational diabetes mellitus on insulin   Total time > 40 min                   Interpretation    Baseline  beats per minute   Accelerations  15 x 15 present x1, many 10 x 10    Decelerations Absent       Additional Comments Non-reactove        Recommendations for f/u See above - BPP       This note has been electronically signed.    Tootie Maher MD

## 2025-05-08 ENCOUNTER — MEDICATION THERAPY MANAGEMENT (OUTPATIENT)
Dept: OBSTETRICS AND GYNECOLOGY | Facility: HOSPITAL | Age: 35
End: 2025-05-08
Payer: COMMERCIAL

## 2025-05-08 DIAGNOSIS — Z86.32 HISTORY OF GESTATIONAL DIABETES IN PRIOR PREGNANCY, CURRENTLY PREGNANT: ICD-10-CM

## 2025-05-08 DIAGNOSIS — O09.299 HISTORY OF GESTATIONAL DIABETES IN PRIOR PREGNANCY, CURRENTLY PREGNANT: ICD-10-CM

## 2025-05-08 RX ORDER — INSULIN GLARGINE 100 [IU]/ML
12 INJECTION, SOLUTION SUBCUTANEOUS NIGHTLY
Qty: 3 ML | Refills: 2 | Status: SHIPPED | OUTPATIENT
Start: 2025-05-08

## 2025-05-09 ENCOUNTER — ROUTINE PRENATAL (OUTPATIENT)
Dept: OBSTETRICS AND GYNECOLOGY | Facility: CLINIC | Age: 35
End: 2025-05-09
Payer: COMMERCIAL

## 2025-05-09 VITALS — SYSTOLIC BLOOD PRESSURE: 110 MMHG | BODY MASS INDEX: 32.44 KG/M2 | WEIGHT: 189 LBS | DIASTOLIC BLOOD PRESSURE: 60 MMHG

## 2025-05-09 DIAGNOSIS — O09.93 HIGH-RISK PREGNANCY IN THIRD TRIMESTER: Primary | ICD-10-CM

## 2025-05-09 LAB
GLUCOSE UR STRIP-MCNC: ABNORMAL MG/DL
PROT UR STRIP-MCNC: NEGATIVE MG/DL

## 2025-05-09 NOTE — PROGRESS NOTES
Chief Complaint   Patient presents with    Routine Prenatal Visit     BPP done today        HPI: Aye is a  currently at 35w1d who today reports the following:  Contractions - No; Leaking - No; Vaginal bleeding -  No; Swelling of extremities - No.    Fasting 99 this morning. She had spaghetti last night.   She is supposed to increase her insulin to 12 units tonight.     ROS:  GI: Nausea - No; Constipation - No; Diarrhea - No    Neuro: Headache - No; Visual change - No      EXAM:  Vitals: See prenatal flowsheet   Abdomen: See prenatal flowsheet   Urine glucose/protein: See prenatal flowsheet   Pelvic: See prenatal flowsheet   MDM:   Impression: Supervision of high risk pregnancy  GDMA2 - favor pre-existing type 2 diabetes  AMA - cfDNA normal  History of  section with planned repeat  section   Maternal anemia    Tests done today: Urine dip for protein and glucose  BPP -    Topics discussed: Continue with PNV's  Prenatal labs reviewed   labor signs and symptoms  Symptoms of preeclampsia  Continue insulin per MFM recommendations   Continue weekly  testing    Tests scheduled today for her next visit:   GBS testing  NST

## 2025-05-14 ENCOUNTER — TELEPHONE (OUTPATIENT)
Dept: OBSTETRICS AND GYNECOLOGY | Facility: HOSPITAL | Age: 35
End: 2025-05-14
Payer: COMMERCIAL

## 2025-05-14 NOTE — TELEPHONE ENCOUNTER
Attempted to reach patient by phone.  Left message requesting patient send in a blood sugar log today.  Carmela Petty RN

## 2025-05-15 ENCOUNTER — ROUTINE PRENATAL (OUTPATIENT)
Dept: OBSTETRICS AND GYNECOLOGY | Facility: CLINIC | Age: 35
End: 2025-05-15
Payer: COMMERCIAL

## 2025-05-15 VITALS — DIASTOLIC BLOOD PRESSURE: 68 MMHG | BODY MASS INDEX: 32.79 KG/M2 | SYSTOLIC BLOOD PRESSURE: 100 MMHG | WEIGHT: 191 LBS

## 2025-05-15 DIAGNOSIS — O09.529 ANTEPARTUM MULTIGRAVIDA OF ADVANCED MATERNAL AGE: ICD-10-CM

## 2025-05-15 DIAGNOSIS — O99.019 MATERNAL ANEMIA IN PREGNANCY, ANTEPARTUM: ICD-10-CM

## 2025-05-15 DIAGNOSIS — O99.810 ABNORMAL O'SULLIVAN GLUCOSE CHALLENGE TEST, ANTEPARTUM: ICD-10-CM

## 2025-05-15 DIAGNOSIS — O23.43 URINARY TRACT INFECTION IN MOTHER DURING THIRD TRIMESTER OF PREGNANCY: ICD-10-CM

## 2025-05-15 DIAGNOSIS — O09.93 HIGH-RISK PREGNANCY IN THIRD TRIMESTER: Primary | ICD-10-CM

## 2025-05-15 DIAGNOSIS — O24.419 GESTATIONAL DIABETES MELLITUS, CLASS A2: ICD-10-CM

## 2025-05-15 DIAGNOSIS — Z36.85 ANTENATAL SCREENING FOR STREPTOCOCCUS B: ICD-10-CM

## 2025-05-15 DIAGNOSIS — Z98.891 HISTORY OF CESAREAN DELIVERY: ICD-10-CM

## 2025-05-15 LAB
BACTERIA UR QL AUTO: ABNORMAL /HPF
BILIRUB UR QL STRIP: NEGATIVE
CLARITY UR: ABNORMAL
CLARITY, POC: ABNORMAL
COLOR UR: ABNORMAL
COLOR UR: YELLOW
GLUCOSE UR STRIP-MCNC: NEGATIVE MG/DL
GLUCOSE UR STRIP-MCNC: NEGATIVE MG/DL
HGB UR QL STRIP.AUTO: NEGATIVE
HOLD SPECIMEN: NORMAL
HYALINE CASTS UR QL AUTO: ABNORMAL /LPF
KETONES UR QL STRIP: NEGATIVE
LEUKOCYTE EST, POC: ABNORMAL
LEUKOCYTE ESTERASE UR QL STRIP.AUTO: ABNORMAL
NITRITE UR QL STRIP: NEGATIVE
NITRITE UR-MCNC: NEGATIVE MG/ML
PH UR STRIP.AUTO: 6.5 [PH] (ref 5–8)
PROT UR QL STRIP: NEGATIVE
PROT UR STRIP-MCNC: ABNORMAL MG/DL
RBC # UR STRIP: ABNORMAL /HPF
REF LAB TEST METHOD: ABNORMAL
SP GR UR STRIP: 1.01 (ref 1–1.03)
SQUAMOUS #/AREA URNS HPF: ABNORMAL /HPF
UROBILINOGEN UR QL STRIP: ABNORMAL
WBC # UR STRIP: ABNORMAL /HPF

## 2025-05-15 PROCEDURE — 87086 URINE CULTURE/COLONY COUNT: CPT | Performed by: OBSTETRICS & GYNECOLOGY

## 2025-05-15 PROCEDURE — 81001 URINALYSIS AUTO W/SCOPE: CPT | Performed by: OBSTETRICS & GYNECOLOGY

## 2025-05-15 NOTE — PROGRESS NOTES
Chief Complaint   Patient presents with    Routine Prenatal Visit     NST started at 0801       HPI: Aye is a  currently at 36w0d who today reports the following:  Contractions - No; Leaking - No; Vaginal bleeding -  No; Swelling of extremities - No.    She is on long acting insulin 12 units at night. Fasting glucose this AM was 82.     ROS:  GI: Nausea - No; Constipation - No; Diarrhea - No    Neuro: Headache - No; Visual change - No      EXAM:  Vitals: See prenatal flowsheet   Abdomen: See prenatal flowsheet   Urine glucose/protein: See prenatal flowsheet   Pelvic: See prenatal flowsheet   MDM:   Impression: Supervision of high risk pregnancy  GDMA2 - favor pre-existing type 2 diabetes  AMA - cfDNA normal  History of  section with planned repeat  section   Maternal anemia    Tests done today: Urine dip for protein and glucose  GBS testing    Topics discussed: Continue with PNV's  Prenatal labs reviewed   labor signs and symptoms  Symptoms of preeclampsia  Continue insulin per MFM recs  Add on BPP u/s today    Tests scheduled today for her next visit:   NST     Orders Placed This Encounter   Procedures    Strep B Screen - Swab, Vaginal/Rectum     MDL test # 127     Standing Status:   Future     Expected Date:   5/15/2025     Expiration Date:   6/15/2025     Release to patient:   Routine Release [[id number]]    US Fetal Biophysical Profile;Without Non-Stress Testing     Standing Status:   Future     Expected Date:   2025     Expiration Date:   5/15/2026     Reason for Exam::   GDMA2, non reactive NST     Release to patient:   Routine Release [[id number]]     Non Stress Test      Patient: Aye Nance  : 1990  MRN: [id number]  CSN: [id number]  Date: 5/15/2025    Estimated Date of Delivery: 25  Gestational Age: 36w0d    Indication for NST gestational diabetes mellitus A2  Total time > 30 minutes                    Interpretation    Baseline  beats  per minute   Accelerations  One 15 x 15    Decelerations Absent       Additional Comments Non reactive NST       Recommendations for f/u BPP today        This note has been electronically signed.    Tootie Maher MD

## 2025-05-17 LAB — BACTERIA SPEC AEROBE CULT: NO GROWTH

## 2025-05-22 ENCOUNTER — OFFICE VISIT (OUTPATIENT)
Dept: OBSTETRICS AND GYNECOLOGY | Facility: HOSPITAL | Age: 35
End: 2025-05-22
Payer: COMMERCIAL

## 2025-05-22 ENCOUNTER — TELEPHONE (OUTPATIENT)
Dept: OBSTETRICS AND GYNECOLOGY | Facility: CLINIC | Age: 35
End: 2025-05-22

## 2025-05-22 ENCOUNTER — HOSPITAL ENCOUNTER (OUTPATIENT)
Dept: WOMENS IMAGING | Facility: HOSPITAL | Age: 35
Discharge: HOME | End: 2025-05-22
Admitting: OBSTETRICS & GYNECOLOGY
Payer: COMMERCIAL

## 2025-05-22 VITALS — DIASTOLIC BLOOD PRESSURE: 63 MMHG | WEIGHT: 194 LBS | BODY MASS INDEX: 33.3 KG/M2 | SYSTOLIC BLOOD PRESSURE: 106 MMHG

## 2025-05-22 DIAGNOSIS — O24.419 GESTATIONAL DIABETES MELLITUS, CLASS A2: Primary | ICD-10-CM

## 2025-05-22 DIAGNOSIS — Z98.891 HISTORY OF CESAREAN DELIVERY: ICD-10-CM

## 2025-05-22 DIAGNOSIS — Z3A.32 32 WEEKS GESTATION OF PREGNANCY: ICD-10-CM

## 2025-05-22 DIAGNOSIS — O09.529 ANTEPARTUM MULTIGRAVIDA OF ADVANCED MATERNAL AGE: ICD-10-CM

## 2025-05-22 DIAGNOSIS — O24.419 GESTATIONAL DIABETES MELLITUS (GDM), ANTEPARTUM, GESTATIONAL DIABETES METHOD OF CONTROL UNSPECIFIED: ICD-10-CM

## 2025-05-22 PROBLEM — O36.5930 IUGR (INTRAUTERINE GROWTH RETARDATION) AFFECTING MOTHER, THIRD TRIMESTER, NOT APPLICABLE OR UNSPECIFIED FETUS: Status: ACTIVE | Noted: 2025-05-22

## 2025-05-22 PROCEDURE — 76820 UMBILICAL ARTERY ECHO: CPT

## 2025-05-22 PROCEDURE — 76816 OB US FOLLOW-UP PER FETUS: CPT

## 2025-05-22 PROCEDURE — 76819 FETAL BIOPHYS PROFIL W/O NST: CPT

## 2025-05-22 NOTE — TELEPHONE ENCOUNTER
Please inform patient based on her gestational diabetes and the size of baby PDC recommended twice weekly visits. She may not want to proceed with this but wanted to make sure this was communicated to patient.   Thanks, Tootie Maher MD

## 2025-05-22 NOTE — PROGRESS NOTES
Maternal/Fetal Medicine Consult Note   Date: 2025  Name: Aye Nance    : 1990     MRN: [id number]     Referring Provider: Tootie Maher MD    Chief Complaint  AMA; GDM; Hx c/s    Subjective     History of Present Illness:  Aye Nance is a 34 y.o.  37w0d who presents today for gestational diabetes    SHAAN: Estimated Date of Delivery: 25     ROS:   Otherwise Noted in HPI      Current Outpatient Medications:     aspirin 81 MG chewable tablet, Chew 1 tablet Daily., Disp: , Rfl:     docusate sodium (COLACE) 100 MG capsule, Take 1 capsule by mouth 2 (Two) Times a Day As Needed for Constipation., Disp: 60 capsule, Rfl: 1    ferrous sulfate 325 (65 FE) MG tablet, Take 1 tablet by mouth Daily With Breakfast. (Patient taking differently: Take 1 tablet by mouth. Every other day), Disp: 30 tablet, Rfl: 11    glucose blood test strip, Please take your blood sugar fasting (goal <95) and 2 hours after each meal (goal <120) and record., Disp: 120 each, Rfl: 12    glucose monitor monitoring kit, Use 1 each As Needed (see below). Please check your sugar fasting (goal <95) and 2 hours after each meal (goal <120), Disp: 1 each, Rfl: 0    Insulin Glargine (BASAGLAR KWIKPEN) 100 UNIT/ML injection pen, Inject 12 Units under the skin into the appropriate area as directed Every Night., Disp: 3 mL, Rfl: 2    Insulin Pen Needle (Pen Needles) 31G X 5 MM misc, Use 1 each 4 (Four) Times a Day., Disp: 100 each, Rfl: 2    Lancets (freestyle) lancets, Please take your blood sugar fasting (goal <95) and 2 hours after each meal (goal <120) and record., Disp: 120 each, Rfl: 12    prenatal vitamin (prenatal, CLASSIC, vitamin) tablet, Take  by mouth Daily., Disp: , Rfl:     Probiotic Product (PROBIOTIC DAILY PO), , Disp: , Rfl:     Continuous Glucose Sensor (Dexcom G7 Sensor) misc, Use 1 each Every 10 (Ten) Days. (Patient not taking: Reported on 2025), Disp: 3 each, Rfl: 6    sertraline (ZOLOFT)  "50 MG tablet, Take 1 tablet by mouth Daily. (Patient not taking: Reported on 2025), Disp: , Rfl:     Objective     Vital Signs  /63   Wt 88 kg (194 lb)   LMP 2024   Estimated body mass index is 33.3 kg/m² as calculated from the following:    Height as of 24: 162.6 cm (64\").    Weight as of this encounter: 88 kg (194 lb).    Ultrasound Impression:   See Viewpoint     Assessment and Plan     Aye Nance is a 34 y.o.  37w0d who presents today for gestational diabetes    Diagnoses and all orders for this visit:    1. Gestational diabetes mellitus, class A2 (Primary)  Assessment & Plan:  Patient presents for follow-up growth ultrasound secondary to gestational diabetes.  Patient currently on 12 units of Lantus.  Patient did not send glucose log today for review.  We discussed importance of glucose control and asked patient to send as soon as possible.  Today's growth ultrasound shows overall growth at the 16th percentile though AC is slightly smaller at the 8th percentile.  STANLEY, BPP, umbilical artery Dopplers are normal.  Would recommend continue twice-weekly  testing until timing of delivery.  If  testing becomes nonreassuring would recommend proceeding with delivery.           Follow Up  No follow-ups on file.    I spent 10 minutes caring for the patient on the day of service. This included: obtaining or reviewing a separately obtained medical history, reviewing patient records, performing a medically appropriate exam and/or evaluation, counseling or educating the patient/family/caregiver, ordering medications, labs, and/or procedures and documenting such in the medical record. This does not include time spent on review and interpretation of other tests such as fetal ultrasound or the performance of other procedures such as amniocentesis or CVS.      Roge Galvan MD, FACOG  Maternal Fetal Medicine, [facility]    [facility]   "

## 2025-05-22 NOTE — PROGRESS NOTES
Patient denies any leaking of fluid or vaginal bleeding. Patient states has brenda sterling contractions, relieved with rest.   NIPT low risk.  Patient reports next follow-up appointment with Dr. Maher's office is 5/29.

## 2025-05-22 NOTE — ASSESSMENT & PLAN NOTE
Patient presents for follow-up growth ultrasound secondary to gestational diabetes.  Patient currently on 12 units of Lantus.  Patient did not send glucose log today for review.  We discussed importance of glucose control and asked patient to send as soon as possible.  Today's growth ultrasound shows overall growth at the 16th percentile though AC is slightly smaller at the 8th percentile.  STANLEY, BPP, umbilical artery Dopplers are normal.  Would recommend continue twice-weekly  testing until timing of delivery.  If  testing becomes nonreassuring would recommend proceeding with delivery.

## 2025-05-22 NOTE — LETTER
May 22, 2025     Tootie Maher MD  [address] [facility]  [address] [place] [address]    Patient: Aye Nance   YOB: 1990   Date of Visit: 2025       Dear Tootie Maher MD,    Thank you for referring Aye Nance to me for evaluation. Below is a copy of my consult note.    If you have questions, please do not hesitate to call me. I look forward to following Aye along with you.         Sincerely,        Roge Galvan MD        CC: No Recipients    Patient denies any leaking of fluid or vaginal bleeding. Patient states has brenda sterling contractions, relieved with rest.   NIPT low risk.  Patient reports next follow-up appointment with Dr. Maher's office is .          Maternal/Fetal Medicine Consult Note   Date: 2025  Name: Aye Nance    : 1990     MRN: [id number]     Referring Provider: Tootie Maher MD    Chief Complaint  AMA; GDM; Hx c/s    Subjective     History of Present Illness:  Aye Nance is a 34 y.o.  37w0d who presents today for gestational diabetes    SHAAN: Estimated Date of Delivery: 25     ROS:   Otherwise Noted in HPI      Current Outpatient Medications:   •  aspirin 81 MG chewable tablet, Chew 1 tablet Daily., Disp: , Rfl:   •  docusate sodium (COLACE) 100 MG capsule, Take 1 capsule by mouth 2 (Two) Times a Day As Needed for Constipation., Disp: 60 capsule, Rfl: 1  •  ferrous sulfate 325 (65 FE) MG tablet, Take 1 tablet by mouth Daily With Breakfast. (Patient taking differently: Take 1 tablet by mouth. Every other day), Disp: 30 tablet, Rfl: 11  •  glucose blood test strip, Please take your blood sugar fasting (goal <95) and 2 hours after each meal (goal <120) and record., Disp: 120 each, Rfl: 12  •  glucose monitor monitoring kit, Use 1 each As Needed (see below). Please check your sugar fasting (goal <95) and 2 hours after each meal (goal <120), Disp: 1 each, Rfl: 0  •  Insulin Glargine (BASAGLAR  "KWIKPEN) 100 UNIT/ML injection pen, Inject 12 Units under the skin into the appropriate area as directed Every Night., Disp: 3 mL, Rfl: 2  •  Insulin Pen Needle (Pen Needles) 31G X 5 MM misc, Use 1 each 4 (Four) Times a Day., Disp: 100 each, Rfl: 2  •  Lancets (freestyle) lancets, Please take your blood sugar fasting (goal <95) and 2 hours after each meal (goal <120) and record., Disp: 120 each, Rfl: 12  •  prenatal vitamin (prenatal, CLASSIC, vitamin) tablet, Take  by mouth Daily., Disp: , Rfl:   •  Probiotic Product (PROBIOTIC DAILY PO), , Disp: , Rfl:   •  Continuous Glucose Sensor (Dexcom G7 Sensor) misc, Use 1 each Every 10 (Ten) Days. (Patient not taking: Reported on 2025), Disp: 3 each, Rfl: 6  •  sertraline (ZOLOFT) 50 MG tablet, Take 1 tablet by mouth Daily. (Patient not taking: Reported on 2025), Disp: , Rfl:     Objective     Vital Signs  /63   Wt 88 kg (194 lb)   LMP 2024   Estimated body mass index is 33.3 kg/m² as calculated from the following:    Height as of 24: 162.6 cm (64\").    Weight as of this encounter: 88 kg (194 lb).    Ultrasound Impression:   See Viewpoint     Assessment and Plan     Aye Nance is a 34 y.o.  37w0d who presents today for gestational diabetes    Diagnoses and all orders for this visit:    1. Gestational diabetes mellitus, class A2 (Primary)  Assessment & Plan:  Patient presents for follow-up growth ultrasound secondary to gestational diabetes.  Patient currently on 12 units of Lantus.  Patient did not send glucose log today for review.  We discussed importance of glucose control and asked patient to send as soon as possible.  Today's growth ultrasound shows overall growth at the 16th percentile though AC is slightly smaller at the 8th percentile.  STANLEY, BPP, umbilical artery Dopplers are normal.  Would recommend continue twice-weekly  testing until timing of delivery.  If  testing becomes nonreassuring would " recommend proceeding with delivery.           Follow Up  No follow-ups on file.    I spent 10 minutes caring for the patient on the day of service. This included: obtaining or reviewing a separately obtained medical history, reviewing patient records, performing a medically appropriate exam and/or evaluation, counseling or educating the patient/family/caregiver, ordering medications, labs, and/or procedures and documenting such in the medical record. This does not include time spent on review and interpretation of other tests such as fetal ultrasound or the performance of other procedures such as amniocentesis or CVS.      Roge Galvan MD, FACOG  Maternal Fetal Medicine, [facility] [employer]

## 2025-05-23 NOTE — TELEPHONE ENCOUNTER
Pt informed and stated understanding.  Pt stated that she did not want to do twice weekly testing.

## 2025-05-27 ENCOUNTER — TELEPHONE (OUTPATIENT)
Dept: OBSTETRICS AND GYNECOLOGY | Facility: HOSPITAL | Age: 35
End: 2025-05-27
Payer: COMMERCIAL

## 2025-05-27 NOTE — TELEPHONE ENCOUNTER
Attempted to reach patient by phone.  Message left requesting patient send in blood sugar log today.  Carmela Petty RN

## 2025-05-29 ENCOUNTER — ROUTINE PRENATAL (OUTPATIENT)
Dept: OBSTETRICS AND GYNECOLOGY | Facility: CLINIC | Age: 35
End: 2025-05-29
Payer: COMMERCIAL

## 2025-05-29 ENCOUNTER — HOSPITAL ENCOUNTER (OUTPATIENT)
Facility: HOSPITAL | Age: 35
Discharge: HOME | End: 2025-05-29
Attending: OBSTETRICS & GYNECOLOGY | Admitting: OBSTETRICS & GYNECOLOGY
Payer: COMMERCIAL

## 2025-05-29 VITALS — WEIGHT: 193.4 LBS | BODY MASS INDEX: 33.2 KG/M2 | SYSTOLIC BLOOD PRESSURE: 110 MMHG | DIASTOLIC BLOOD PRESSURE: 66 MMHG

## 2025-05-29 VITALS
HEIGHT: 64 IN | TEMPERATURE: 97.8 F | DIASTOLIC BLOOD PRESSURE: 72 MMHG | HEART RATE: 63 BPM | BODY MASS INDEX: 32.95 KG/M2 | WEIGHT: 193 LBS | SYSTOLIC BLOOD PRESSURE: 108 MMHG | OXYGEN SATURATION: 98 % | RESPIRATION RATE: 16 BRPM

## 2025-05-29 DIAGNOSIS — O09.529 ANTEPARTUM MULTIGRAVIDA OF ADVANCED MATERNAL AGE: ICD-10-CM

## 2025-05-29 DIAGNOSIS — O09.299 HISTORY OF GESTATIONAL DIABETES IN PRIOR PREGNANCY, CURRENTLY PREGNANT: ICD-10-CM

## 2025-05-29 DIAGNOSIS — Z86.32 HISTORY OF GESTATIONAL DIABETES IN PRIOR PREGNANCY, CURRENTLY PREGNANT: ICD-10-CM

## 2025-05-29 DIAGNOSIS — O99.019 MATERNAL ANEMIA IN PREGNANCY, ANTEPARTUM: ICD-10-CM

## 2025-05-29 DIAGNOSIS — Z98.891 HISTORY OF CESAREAN DELIVERY: ICD-10-CM

## 2025-05-29 DIAGNOSIS — O24.419 GESTATIONAL DIABETES MELLITUS, CLASS A2: ICD-10-CM

## 2025-05-29 DIAGNOSIS — O09.93 HIGH-RISK PREGNANCY IN THIRD TRIMESTER: Primary | ICD-10-CM

## 2025-05-29 DIAGNOSIS — O36.5930 IUGR (INTRAUTERINE GROWTH RETARDATION) AFFECTING MOTHER, THIRD TRIMESTER, NOT APPLICABLE OR UNSPECIFIED FETUS: ICD-10-CM

## 2025-05-29 LAB
GLUCOSE UR STRIP-MCNC: ABNORMAL MG/DL
PROT UR STRIP-MCNC: ABNORMAL MG/DL

## 2025-05-29 PROCEDURE — G0463 HOSPITAL OUTPT CLINIC VISIT: HCPCS

## 2025-05-29 NOTE — PROGRESS NOTES
Patient seen after biophysical profile ultrasound.  Results were 8 out of 8.  Amniotic fluid index is 5.4 cm.  Reviewed with patient and partner given the minimal variability seen on initial nonstress test and the amniotic fluid volume of 5.4 cm in the setting of being term and gestational diabetes on insulin and AC at the 8th percentile I recommend proceeding with delivery.  They recommend further testing and if reassuring delaying delivery.  Recommend at least 1 hour of monitoring downstairs and if reassuring we will have her return tomorrow for STANLEY and BPP ultrasound.    Tootie Maher MD

## 2025-05-29 NOTE — DISCHARGE INSTRUCTIONS
Return to Dr. Maher's office tomorrow as instructed by her.      Do fetal movement counts daily and call your doctor immediately if you suspect any decreased fetal movement.    Also, call you doctor immediately for any questions, concerns or problems at all.

## 2025-05-29 NOTE — PROGRESS NOTES
Chief Complaint   Patient presents with    Routine Prenatal Visit     NST started at 8:04 am.        HPI: Aye is a  currently at 38w0d who today reports the following:  Contractions - No; Leaking - No; Vaginal bleeding -  No; Swelling of extremities - No.    She reports glucose values have been appropriate.   Her fasting was in the 80s this morning   She has been doing her 12 units of insulin at night.     ROS:  GI: Nausea - No; Constipation - No; Diarrhea - No    Neuro: Headache - No; Visual change - No      EXAM:  Vitals: See prenatal flowsheet   Abdomen: See prenatal flowsheet   Urine glucose/protein: See prenatal flowsheet   Pelvic: See prenatal flowsheet   MDM:   Impression: Supervision of high risk pregnancy  GDMA2 - favor pre-existing type 2 diabetes  IUGR by AC 8%  AMA - cfDNA normal  History of  section with planned repeat  section   Maternal anemia    Tests done today: Urine dip for protein and glucose  NST - reactive   Topics discussed: Continue with PNV's  Prenatal labs reviewed  Add on BPP u/s due to initial decreased variability that improved after being on the monitor over 1 hour. Reviewed tracing with MFM.    Tests scheduled today for her next visit:   U/S for BPP     Non Stress Test      Patient: Aye Nance  : 1990  MRN: [id number]  CSN: [id number]  Date: 2025    Estimated Date of Delivery: 25  Gestational Age: 38w0d    Indication for NST GDMA2, AC 8%  Total time > 1 hour                    Interpretation    Baseline  beats per minute   Accelerations  Present   Decelerations Absent    Initially minimal variability with improvement to moderate over 30 minutes      Additional Comments See above       Recommendations for f/u See above        This note has been electronically signed.    Tootie Maher MD

## 2025-05-29 NOTE — H&P
Non Stress Test    [facility]  Patient: Aye Nance  : 1990  MRN: [id number]  CSN: [id number]  Date: 2025    Estimated Date of Delivery: 25  Gestational Age: 38w0d    Indication for fetal monitoring  Extended monitoring from office - see office procedure notes   Total time > 1 hour                    Interpretation    Baseline  beats per minute  Moderate variability    Accelerations  Present    Decelerations Absent       Additional Comments Reactive and reassuring        Recommendations for f/u See below       We have discussed in the office given gestational diabetes on insulin and AC at 8 percentile with findings of the office recommended delivery today.  The patient and her partner preferred to have additional fetal monitoring and continued testing and trying to keep originally scheduled  section.  Reviewed monitoring on labor and delivery appears reassuring.  Reviewed fluid is technically normal but right at the cutoff.  We reviewed risk of stillbirth given findings today and her comorbidities.  Patient and partner desire to keep originally scheduled  section time.  Will have patient return tomorrow in the office for a biophysical profile ultrasound with STANLEY measurement.  She will also keep her scheduled follow-ups next week.  Patient and partner verbalized understanding and they know they can reach our office and the on-call provider.    Tootie Maher MD

## 2025-05-30 ENCOUNTER — ROUTINE PRENATAL (OUTPATIENT)
Dept: OBSTETRICS AND GYNECOLOGY | Facility: CLINIC | Age: 35
End: 2025-05-30
Payer: COMMERCIAL

## 2025-05-30 VITALS — WEIGHT: 196 LBS | DIASTOLIC BLOOD PRESSURE: 60 MMHG | SYSTOLIC BLOOD PRESSURE: 110 MMHG | BODY MASS INDEX: 33.64 KG/M2

## 2025-05-30 DIAGNOSIS — O24.419 GESTATIONAL DIABETES MELLITUS, CLASS A2: ICD-10-CM

## 2025-05-30 DIAGNOSIS — O09.529 ANTEPARTUM MULTIGRAVIDA OF ADVANCED MATERNAL AGE: ICD-10-CM

## 2025-05-30 DIAGNOSIS — O09.93 HIGH-RISK PREGNANCY IN THIRD TRIMESTER: Primary | ICD-10-CM

## 2025-05-30 DIAGNOSIS — O36.5930 IUGR (INTRAUTERINE GROWTH RETARDATION) AFFECTING MOTHER, THIRD TRIMESTER, NOT APPLICABLE OR UNSPECIFIED FETUS: ICD-10-CM

## 2025-05-30 LAB
GLUCOSE UR STRIP-MCNC: NEGATIVE MG/DL
PROT UR STRIP-MCNC: ABNORMAL MG/DL

## 2025-05-30 NOTE — PROGRESS NOTES
Chief Complaint   Patient presents with    Routine Prenatal Visit     Bpp        HPI: Aye is a  currently at 38w1d who today reports the following:  Contractions - No; Leaking - No; Vaginal bleeding -  No; Swelling of extremities - No.  She reports good fetal movement. She was seen yesterday and NST was non-reactive. Follow up BPP , borderline STANLEY. Initially Dr Maher recommended delivery, but patient desired further testing and potentially delaying delivery- after reassuring monitoring in labor and delivery, patient reports today for follow up BPP and STANLEY. BPP and STANLEY today reassuring.   ROS:  GI: Nausea - No; Constipation - No; Diarrhea - No    Neuro: Headache - No; Visual change - No    Respiratory: Cough - No; SOB - No; fever - No     EXAM:  Vitals: See prenatal flowsheet   Abdomen: See prenatal flowsheet   Urine glucose/protein: See prenatal flowsheet   Pelvic: See prenatal flowsheet   MDM:   Impression: Supervision of high risk pregnancy  DM - GDMA2  IUGR (AC < 10%)  Previous C/S with planned repeat C/S  AMA - cfDNA normal  Anemia in pregnancy   Tests done today: Urine dip for protein and glucose  BPP -    Topics discussed: Prenatal labs reviewed  Continue with Rx prenatal vitamins.  No additional counseling given - she has no specific complaints or concerns   Tests scheduled today for her next visit:   NST

## 2025-06-02 ENCOUNTER — PREP FOR SURGERY (OUTPATIENT)
Dept: OTHER | Facility: HOSPITAL | Age: 35
End: 2025-06-02
Payer: COMMERCIAL

## 2025-06-02 ENCOUNTER — ROUTINE PRENATAL (OUTPATIENT)
Dept: OBSTETRICS AND GYNECOLOGY | Facility: CLINIC | Age: 35
End: 2025-06-02
Payer: COMMERCIAL

## 2025-06-02 VITALS — DIASTOLIC BLOOD PRESSURE: 70 MMHG | SYSTOLIC BLOOD PRESSURE: 110 MMHG

## 2025-06-02 DIAGNOSIS — O09.93 HIGH-RISK PREGNANCY IN THIRD TRIMESTER: Primary | ICD-10-CM

## 2025-06-02 DIAGNOSIS — O24.419 GESTATIONAL DIABETES MELLITUS, CLASS A2: ICD-10-CM

## 2025-06-02 DIAGNOSIS — O99.019 MATERNAL ANEMIA IN PREGNANCY, ANTEPARTUM: ICD-10-CM

## 2025-06-02 DIAGNOSIS — O09.299 HISTORY OF GESTATIONAL DIABETES IN PRIOR PREGNANCY, CURRENTLY PREGNANT: ICD-10-CM

## 2025-06-02 DIAGNOSIS — O36.5930 IUGR (INTRAUTERINE GROWTH RETARDATION) AFFECTING MOTHER, THIRD TRIMESTER, NOT APPLICABLE OR UNSPECIFIED FETUS: ICD-10-CM

## 2025-06-02 DIAGNOSIS — O09.529 ANTEPARTUM MULTIGRAVIDA OF ADVANCED MATERNAL AGE: ICD-10-CM

## 2025-06-02 DIAGNOSIS — Z98.891 HISTORY OF CESAREAN DELIVERY: Primary | ICD-10-CM

## 2025-06-02 DIAGNOSIS — O99.810 ABNORMAL O'SULLIVAN GLUCOSE CHALLENGE TEST, ANTEPARTUM: ICD-10-CM

## 2025-06-02 DIAGNOSIS — Z98.891 HISTORY OF CESAREAN DELIVERY: ICD-10-CM

## 2025-06-02 DIAGNOSIS — Z86.32 HISTORY OF GESTATIONAL DIABETES IN PRIOR PREGNANCY, CURRENTLY PREGNANT: ICD-10-CM

## 2025-06-02 PROBLEM — O34.219 PREVIOUS CESAREAN DELIVERY AFFECTING PREGNANCY: Status: ACTIVE | Noted: 2025-06-02

## 2025-06-02 PROCEDURE — 0502F SUBSEQUENT PRENATAL CARE: CPT | Performed by: OBSTETRICS & GYNECOLOGY

## 2025-06-02 RX ORDER — OXYTOCIN/0.9 % SODIUM CHLORIDE 30/500 ML
650 PLASTIC BAG, INJECTION (ML) INTRAVENOUS ONCE
Status: CANCELLED | OUTPATIENT
Start: 2025-06-02 | End: 2025-06-02

## 2025-06-02 RX ORDER — SODIUM CHLORIDE 0.9 % (FLUSH) 0.9 %
10 SYRINGE (ML) INJECTION EVERY 12 HOURS SCHEDULED
Status: CANCELLED | OUTPATIENT
Start: 2025-06-02

## 2025-06-02 RX ORDER — PROMETHAZINE HYDROCHLORIDE 12.5 MG/1
12.5 TABLET ORAL EVERY 6 HOURS PRN
Status: CANCELLED | OUTPATIENT
Start: 2025-06-02

## 2025-06-02 RX ORDER — SODIUM CHLORIDE, SODIUM LACTATE, POTASSIUM CHLORIDE, CALCIUM CHLORIDE 600; 310; 30; 20 MG/100ML; MG/100ML; MG/100ML; MG/100ML
125 INJECTION, SOLUTION INTRAVENOUS CONTINUOUS
Status: CANCELLED | OUTPATIENT
Start: 2025-06-02 | End: 2025-06-03

## 2025-06-02 RX ORDER — BUPIVACAINE HCL/0.9 % NACL/PF 0.1 %
2 PLASTIC BAG, INJECTION (ML) EPIDURAL ONCE
Status: CANCELLED | OUTPATIENT
Start: 2025-06-02 | End: 2025-06-02

## 2025-06-02 RX ORDER — SODIUM CHLORIDE 9 MG/ML
40 INJECTION, SOLUTION INTRAVENOUS AS NEEDED
Status: CANCELLED | OUTPATIENT
Start: 2025-06-02

## 2025-06-02 RX ORDER — ACETAMINOPHEN 500 MG
1000 TABLET ORAL ONCE
Status: CANCELLED | OUTPATIENT
Start: 2025-06-02 | End: 2025-06-02

## 2025-06-02 RX ORDER — CARBOPROST TROMETHAMINE 250 UG/ML
250 INJECTION, SOLUTION INTRAMUSCULAR AS NEEDED
Status: CANCELLED | OUTPATIENT
Start: 2025-06-02

## 2025-06-02 RX ORDER — OXYTOCIN/0.9 % SODIUM CHLORIDE 30/500 ML
85 PLASTIC BAG, INJECTION (ML) INTRAVENOUS ONCE
Status: CANCELLED | OUTPATIENT
Start: 2025-06-02 | End: 2025-06-02

## 2025-06-02 RX ORDER — KETOROLAC TROMETHAMINE 30 MG/ML
30 INJECTION, SOLUTION INTRAMUSCULAR; INTRAVENOUS ONCE
Status: CANCELLED | OUTPATIENT
Start: 2025-06-02 | End: 2025-06-02

## 2025-06-02 RX ORDER — SODIUM CHLORIDE 0.9 % (FLUSH) 0.9 %
10 SYRINGE (ML) INJECTION AS NEEDED
Status: CANCELLED | OUTPATIENT
Start: 2025-06-02

## 2025-06-02 RX ORDER — LIDOCAINE HYDROCHLORIDE 10 MG/ML
0.5 INJECTION, SOLUTION EPIDURAL; INFILTRATION; INTRACAUDAL; PERINEURAL ONCE AS NEEDED
Status: CANCELLED | OUTPATIENT
Start: 2025-06-02

## 2025-06-02 RX ORDER — PROMETHAZINE HYDROCHLORIDE 12.5 MG/1
12.5 SUPPOSITORY RECTAL EVERY 6 HOURS PRN
Status: CANCELLED | OUTPATIENT
Start: 2025-06-02

## 2025-06-02 RX ORDER — MISOPROSTOL 200 UG/1
800 TABLET ORAL AS NEEDED
Status: CANCELLED | OUTPATIENT
Start: 2025-06-02

## 2025-06-02 RX ORDER — METHYLERGONOVINE MALEATE 0.2 MG/ML
200 INJECTION INTRAVENOUS ONCE AS NEEDED
Status: CANCELLED | OUTPATIENT
Start: 2025-06-02

## 2025-06-02 RX ORDER — CITRIC ACID/SODIUM CITRATE 334-500MG
30 SOLUTION, ORAL ORAL ONCE
Status: CANCELLED | OUTPATIENT
Start: 2025-06-02 | End: 2025-06-02

## 2025-06-02 NOTE — H&P (VIEW-ONLY)
Aye Lambert  : 1990  MRN: [id number]  CSN: [id number]    History and Physical  CC: scheduled  section     Subjective   Aye Lambert is a 34 y.o. year old  with an Estimated Date of Delivery: 25 scheduled for  delivery due to previous C/S - declines .  Her placental location is posterior.  She is not planning for sterilization at the time of the .    Prenatal care has been with Dr. Maher.  It has been complicated by IUGR by AC 8%, GDM - A2 (her diabetes has been well controlled), AMA (genetic screening was normal), anemia, and previous C/S - (desires repeat ).    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 [address]      Past Medical History:   Diagnosis Date    Anemia     Diabetes     Gestational diabetes on insulin in third trimester      Past Surgical History:   Procedure Laterality Date     SECTION  2014     SECTION N/A 2023    Procedure:  SECTION REPEAT;  Surgeon: Tootie Maher MD;  Location: [facility] LABOR DELIVERY;  Service: Obstetrics/Gynecology;  Laterality: N/A;    WISDOM TOOTH EXTRACTION         Current Outpatient Medications:     aspirin 81 MG chewable tablet, Chew 1 tablet Daily., Disp: , Rfl:     Continuous Glucose Sensor (Dexcom G7 Sensor) misc, Use 1 each Every 10 (Ten) Days., Disp: 3 each, Rfl: 6    docusate sodium (COLACE) 100 MG capsule,  Take 1 capsule by mouth 2 (Two) Times a Day As Needed for Constipation., Disp: 60 capsule, Rfl: 1    ferrous sulfate 325 (65 FE) MG tablet, Take 1 tablet by mouth Daily With Breakfast. (Patient taking differently: Take 1 tablet by mouth. Every other day), Disp: 30 tablet, Rfl: 11    glucose blood test strip, Please take your blood sugar fasting (goal <95) and 2 hours after each meal (goal <120) and record., Disp: 120 each, Rfl: 12    glucose monitor monitoring kit, Use 1 each As Needed (see below). Please check your sugar fasting (goal <95) and 2 hours after each meal (goal <120), Disp: 1 each, Rfl: 0    Insulin Glargine (BASAGLAR KWIKPEN) 100 UNIT/ML injection pen, Inject 12 Units under the skin into the appropriate area as directed Every Night., Disp: 3 mL, Rfl: 2    Insulin Pen Needle (Pen Needles) 31G X 5 MM misc, Use 1 each 4 (Four) Times a Day., Disp: 100 each, Rfl: 2    Lancets (freestyle) lancets, Please take your blood sugar fasting (goal <95) and 2 hours after each meal (goal <120) and record., Disp: 120 each, Rfl: 12    prenatal vitamin (prenatal, CLASSIC, vitamin) tablet, Take  by mouth Daily., Disp: , Rfl:     Probiotic Product (PROBIOTIC DAILY PO), , Disp: , Rfl:     sertraline (ZOLOFT) 50 MG tablet, Take 1 tablet by mouth Daily., Disp: , Rfl:     Allergies   Allergen Reactions    Levemir [Insulin Detemir] Itching     Itching and bruising at injection site.       Social History    Tobacco Use      Smoking status: Never      Smokeless tobacco: Never    Review of Systems   Constitutional:  Negative for chills and fever.   HENT:  Negative for congestion and sore throat.    Respiratory:  Negative for shortness of breath and wheezing.    Cardiovascular:  Negative for chest pain and palpitations.   Gastrointestinal:  Negative for abdominal pain, nausea and vomiting.   Genitourinary:  Negative for frequency, vaginal bleeding and vaginal pain.   Musculoskeletal:  Negative for back pain and myalgias.    Neurological:  Negative for dizziness, light-headedness and headaches.   Psychiatric/Behavioral:  Negative for confusion. The patient is not nervous/anxious.          Objective   LMP 2024   General: well developed; well nourished  no acute distress  mentation appropriate   Heart: Not performed.   Lungs: breathing is unlabored   Abdomen: soft, non-tender; no masses  no umbilical or inguinal hernias are present  no hepato-splenomegaly     Prenatal Labs  Lab Results   Component Value Date    HGB 10.8 (L) 2024    RUBELLAABIGG 1.36 2024    HEPBSAG Non-Reactive 2024    ABSCRN Negative 2024    GIT7KZV1 Non-Reactive 2024    HEPCVIRUSABY Non-Reactive 2024     (H) 2024    GGTFASTING 107 (H) 2023    WPR1QAFU 205 (H) 2023    WZA7OHGY 148 2023    KRD8WRTA 112 2023    STREPGPB negative 05/15/2025    URINECX No growth 05/15/2025    CHLAMNAA negative 2024    NGONORRHON negative 2024       Recent Labs  Lab Results   Component Value Date    HGB 10.8 (L) 2024    HCT 33.9 (L) 2024    WBC 6.60 2024     2024           Assessment   IUP with an Estimated Date of Delivery: 25  Planned  section for previous C/S - declines   GDMA2  IUGR by AC 8%  AMA     Plan   Repeat   ABx and DVT prophylaxis    Tootie Maher MD  2025

## 2025-06-02 NOTE — H&P
Aye Lambert  : 1990  MRN: [id number]  CSN: [id number]    History and Physical  CC: scheduled  section     Subjective   Aye Lambert is a 34 y.o. year old  with an Estimated Date of Delivery: 25 scheduled for  delivery due to previous C/S - declines .  Her placental location is posterior.  She is not planning for sterilization at the time of the .    Prenatal care has been with Dr. Maher.  It has been complicated by IUGR by AC 8%, GDM - A2 (her diabetes has been well controlled), AMA (genetic screening was normal), anemia, and previous C/S - (desires repeat ).    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 [address]      Past Medical History:   Diagnosis Date    Anemia     Diabetes     Gestational diabetes on insulin in third trimester      Past Surgical History:   Procedure Laterality Date     SECTION  2014     SECTION N/A 2023    Procedure:  SECTION REPEAT;  Surgeon: Tootie Maher MD;  Location: [facility] LABOR DELIVERY;  Service: Obstetrics/Gynecology;  Laterality: N/A;    WISDOM TOOTH EXTRACTION         Current Outpatient Medications:     aspirin 81 MG chewable tablet, Chew 1 tablet Daily., Disp: , Rfl:     Continuous Glucose Sensor (Dexcom G7 Sensor) misc, Use 1 each Every 10 (Ten) Days., Disp: 3 each, Rfl: 6    docusate sodium (COLACE) 100 MG capsule,  Take 1 capsule by mouth 2 (Two) Times a Day As Needed for Constipation., Disp: 60 capsule, Rfl: 1    ferrous sulfate 325 (65 FE) MG tablet, Take 1 tablet by mouth Daily With Breakfast. (Patient taking differently: Take 1 tablet by mouth. Every other day), Disp: 30 tablet, Rfl: 11    glucose blood test strip, Please take your blood sugar fasting (goal <95) and 2 hours after each meal (goal <120) and record., Disp: 120 each, Rfl: 12    glucose monitor monitoring kit, Use 1 each As Needed (see below). Please check your sugar fasting (goal <95) and 2 hours after each meal (goal <120), Disp: 1 each, Rfl: 0    Insulin Glargine (BASAGLAR KWIKPEN) 100 UNIT/ML injection pen, Inject 12 Units under the skin into the appropriate area as directed Every Night., Disp: 3 mL, Rfl: 2    Insulin Pen Needle (Pen Needles) 31G X 5 MM misc, Use 1 each 4 (Four) Times a Day., Disp: 100 each, Rfl: 2    Lancets (freestyle) lancets, Please take your blood sugar fasting (goal <95) and 2 hours after each meal (goal <120) and record., Disp: 120 each, Rfl: 12    prenatal vitamin (prenatal, CLASSIC, vitamin) tablet, Take  by mouth Daily., Disp: , Rfl:     Probiotic Product (PROBIOTIC DAILY PO), , Disp: , Rfl:     sertraline (ZOLOFT) 50 MG tablet, Take 1 tablet by mouth Daily., Disp: , Rfl:     Allergies   Allergen Reactions    Levemir [Insulin Detemir] Itching     Itching and bruising at injection site.       Social History    Tobacco Use      Smoking status: Never      Smokeless tobacco: Never    Review of Systems   Constitutional:  Negative for chills and fever.   HENT:  Negative for congestion and sore throat.    Respiratory:  Negative for shortness of breath and wheezing.    Cardiovascular:  Negative for chest pain and palpitations.   Gastrointestinal:  Negative for abdominal pain, nausea and vomiting.   Genitourinary:  Negative for frequency, vaginal bleeding and vaginal pain.   Musculoskeletal:  Negative for back pain and myalgias.    Neurological:  Negative for dizziness, light-headedness and headaches.   Psychiatric/Behavioral:  Negative for confusion. The patient is not nervous/anxious.          Objective   LMP 2024   General: well developed; well nourished  no acute distress  mentation appropriate   Heart: Not performed.   Lungs: breathing is unlabored   Abdomen: soft, non-tender; no masses  no umbilical or inguinal hernias are present  no hepato-splenomegaly     Prenatal Labs  Lab Results   Component Value Date    HGB 10.8 (L) 2024    RUBELLAABIGG 1.36 2024    HEPBSAG Non-Reactive 2024    ABSCRN Negative 2024    LZI0YHI5 Non-Reactive 2024    HEPCVIRUSABY Non-Reactive 2024     (H) 2024    GGTFASTING 107 (H) 2023    DXQ5TXUS 205 (H) 2023    VGS2JOKY 148 2023    JDF7IDMH 112 2023    STREPGPB negative 05/15/2025    URINECX No growth 05/15/2025    CHLAMNAA negative 2024    NGONORRHON negative 2024       Recent Labs  Lab Results   Component Value Date    HGB 10.8 (L) 2024    HCT 33.9 (L) 2024    WBC 6.60 2024     2024           Assessment   IUP with an Estimated Date of Delivery: 25  Planned  section for previous C/S - declines   GDMA2  IUGR by AC 8%  AMA     Plan   Repeat   ABx and DVT prophylaxis    Tootie Maher MD  2025

## 2025-06-02 NOTE — PROGRESS NOTES
Chief Complaint   Patient presents with    Routine Prenatal Visit     BPP done today        HPI: Aye is a  currently at 38w4d who today reports the following:  Contractions - No; Leaking - No; Vaginal bleeding -  No; Swelling of extremities - No.    ROS:  GI: Nausea - No; Constipation - No; Diarrhea - No    Neuro: Headache - No; Visual change - No      EXAM:  Vitals: See prenatal flowsheet   Abdomen: See prenatal flowsheet   Urine glucose/protein: See prenatal flowsheet   Pelvic: See prenatal flowsheet   MDM:   Impression: Supervision of high risk pregnancy  DM - GDMA2  IUGR (AC < 10%)  Previous C/S with planned repeat C/S  AMA - cfDNA normal  Anemia in pregnancy   Tests done today: Urine dip for protein and glucose  BPP -    Topics discussed: Continue with PNV's  Prenatal labs reviewed  ERAS - acetaminophen and Gatorade/G2  No shaving in advanced of scheduled    labor signs and symptoms  Symptoms of preeclampsia  Reviewed NPO after MN night prior to c/s and to take 1/2 of her normal insulin dose.    Tests scheduled today for her next visit:   RIRI tomorrow

## 2025-06-03 ENCOUNTER — PRE-ADMISSION TESTING (OUTPATIENT)
Dept: PREADMISSION TESTING | Facility: HOSPITAL | Age: 35
End: 2025-06-03
Payer: COMMERCIAL

## 2025-06-03 VITALS — WEIGHT: 194.34 LBS | HEIGHT: 63 IN | BODY MASS INDEX: 34.43 KG/M2

## 2025-06-03 DIAGNOSIS — Z98.891 HISTORY OF CESAREAN DELIVERY: ICD-10-CM

## 2025-06-03 LAB
ABO GROUP BLD: NORMAL
BLD GP AB SCN SERPL QL: NEGATIVE
DEPRECATED RDW RBC AUTO: 44.4 FL (ref 37–54)
ERYTHROCYTE [DISTWIDTH] IN BLOOD BY AUTOMATED COUNT: 15.9 % (ref 12.3–15.4)
HCT VFR BLD AUTO: 31.7 % (ref 34–46.6)
HGB BLD-MCNC: 10 G/DL (ref 12–15.9)
MCH RBC QN AUTO: 24.9 PG (ref 26.6–33)
MCHC RBC AUTO-ENTMCNC: 31.5 G/DL (ref 31.5–35.7)
MCV RBC AUTO: 78.9 FL (ref 79–97)
PLATELET # BLD AUTO: 192 10*3/MM3 (ref 140–450)
PMV BLD AUTO: 13.1 FL (ref 6–12)
RBC # BLD AUTO: 4.02 10*6/MM3 (ref 3.77–5.28)
RH BLD: POSITIVE
T&S EXPIRATION DATE: NORMAL
TREPONEMA PALLIDUM IGG+IGM AB [PRESENCE] IN SERUM OR PLASMA BY IMMUNOASSAY: NORMAL
WBC NRBC COR # BLD AUTO: 7.68 10*3/MM3 (ref 3.4–10.8)

## 2025-06-03 PROCEDURE — 86901 BLOOD TYPING SEROLOGIC RH(D): CPT

## 2025-06-03 PROCEDURE — 36415 COLL VENOUS BLD VENIPUNCTURE: CPT

## 2025-06-03 PROCEDURE — 85027 COMPLETE CBC AUTOMATED: CPT

## 2025-06-03 PROCEDURE — 86850 RBC ANTIBODY SCREEN: CPT

## 2025-06-03 PROCEDURE — 86900 BLOOD TYPING SEROLOGIC ABO: CPT

## 2025-06-03 PROCEDURE — 86780 TREPONEMA PALLIDUM: CPT | Performed by: OBSTETRICS & GYNECOLOGY

## 2025-06-03 NOTE — PAT
An arrival time for procedure was not provided during PAT visit. If patient had any questions or concerns about their arrival time, they were instructed to contact their surgeon/physician.  Additionally, if the patient referred to an arrival time that was acquired from their my chart account, patient was encouraged to verify that time with their surgeon/physician. Arrival times are NOT provided in Pre Admission Testing Department.    Patient viewed general PAT education video as instructed in their preoperative information received from their surgeon.  Patient stated the general PAT education video was viewed in its entirety and survey completed.  Copies of PAT general education handouts (Incentive Spirometry, Meds to Beds Program, Patient Belongings, Pre-op skin preparation instructions, Blood Glucose testing, Visitor policy, Surgery FAQ, Code H) distributed to patient if not printed. Education related to the PAT pass and skin preparation for surgery (if applicable) completed in PAT as a reinforcement to PAT education video. Patient instructed to return PAT pass provided today as well as completed skin preparation sheet (if applicable) on the day of procedure.     Additionally if patient had not viewed video yet but intended to view it at home or in our waiting area, then referred them to the handout with QR code/link provided during PAT visit.  Instructed patient to complete survey after viewing the video in its entirety.  Encouraged patient/family to read PAT general education handouts thoroughly and notify PAT staff with any questions or concerns. Patient verbalized understanding of all information and priority content.    Patient denies any current skin issues.     Patient to apply Chlorhexadine wipes  to surgical area (as instructed) the night before procedure and the AM of procedure. Wipes provided.    Instructed patient to take two Tylenol extra strength (total of 1000 mg) the night before  surgery.    Patient instructed to drink 20 ounces of Gatorade or Gatorlyte (if diabetic) and it needs to be completed 1 hour (for Main OR patients) or 2 hours (scheduled  section & [facility] patients) before given arrival time for procedure (NO RED Gatorade and NO Gatorade Zero).    Patient verbalized understanding.    Blood bank bracelet applied to patient during Pre Admission Testing visit.  Patient instructed not to remove from arm until after procedure and they are discharged from the hospital.  Explained to patient that they may shower and get the bracelet wet, but not to immerse under water for longer periods (bathing, swimming, hand dishwashing, etc).  Patient verbalized understanding.

## 2025-06-03 NOTE — DISCHARGE INSTRUCTIONS
What to know before your arrive:    -Do not eat, drink or chew gum beginning 8 hours before your scheduled arrival time to the hospital.  Except please drink 20 ounces of Gatorade and complete two hours before your given arrival time to the hospital.  If you drink too close to surgery time, your sugery may  be delayed or cancelled.  Please complete as instructed.     -Do not shave any part of your body including abdomen or pelvic are for two days before your procedure.  -If you are taking a scheduled medication (insulin, blood pressure medicine,antibiotics) please consult with your physician whether to take on the day of surgery.  -Remove all jewelry including rings, wedding bands, and piercing before coming to the hospital.  -Leave important valuables at home.  -Do not wear dark fingernail polish.  -Please take two Tylenol 500 mg tablets the evening before surgery.  -Bring the following with you to the hospital:    -Picture ID and insurance, Medicare or Medicaid cards    -Co-pay/deductible required by insurance (Cash, Check, Credit Card)    -Copy of living will or power  document (if applicable)    -CPAP mask and tubing, not machine (if applicable)    -Skin prep instructions sheet    What to know the day of procedure:    -Park in the [facility], take elevator for first floor, exit to the right and  proceed through the doors to outside, follow the covered sidewalk to the entrance of the [facility], follow the [facility] and [facility], enter the [facility] to your right BEFORE entering the 1720 lobby.  Take the elevators to the 3rd floor ([address] [facility]).  -Leave unnecessary items in your vehicle, including your suitcase.  Your support  person or a family member can get it for you after your procedure.   -Check in at the reception desk in the lobby of the 3rd floor ([address] [place]).   -One person may accompany you to the pre-op/recovery area.  Please have  other family members wait in the  waiting room.   -An anesthesiologist will meet with your prior to your procedure.   -After anesthesia has been initiated, one person may accompany you in the  operating room.   -No video cameras are permitted in the operating room; only still cameras,  Please.      What to expect while you are in recovery:     -One person may stay with you while you are in recovery.   -If the baby is stable, he/she may visit to initiate breastfeeding & [facility].      CHLORHEXIDINE GLUCONATE WIPES AND INSTRUCTIONS GIVEN TO PATIENT

## 2025-06-05 ENCOUNTER — ANESTHESIA EVENT (OUTPATIENT)
Dept: LABOR AND DELIVERY | Facility: HOSPITAL | Age: 35
End: 2025-06-05
Payer: COMMERCIAL

## 2025-06-05 ENCOUNTER — ANESTHESIA (OUTPATIENT)
Dept: LABOR AND DELIVERY | Facility: HOSPITAL | Age: 35
End: 2025-06-05
Payer: COMMERCIAL

## 2025-06-05 ENCOUNTER — HOSPITAL ENCOUNTER (INPATIENT)
Facility: HOSPITAL | Age: 35
LOS: 3 days | Discharge: HOME | End: 2025-06-08
Attending: OBSTETRICS & GYNECOLOGY | Admitting: OBSTETRICS & GYNECOLOGY
Payer: COMMERCIAL

## 2025-06-05 DIAGNOSIS — Z98.891 HISTORY OF CESAREAN DELIVERY: ICD-10-CM

## 2025-06-05 DIAGNOSIS — O09.92 HIGH-RISK PREGNANCY IN SECOND TRIMESTER: ICD-10-CM

## 2025-06-05 PROBLEM — O34.219 HISTORY OF CESAREAN SECTION COMPLICATING PREGNANCY: Status: ACTIVE | Noted: 2025-06-05

## 2025-06-05 LAB
ATMOSPHERIC PRESS: ABNORMAL MM[HG]
ATMOSPHERIC PRESS: ABNORMAL MM[HG]
BASE EXCESS BLDCOA CALC-SCNC: -6.4 MMOL/L (ref 0–2)
BASE EXCESS BLDCOV CALC-SCNC: -3.8 MMOL/L (ref 0–2)
BDY SITE: ABNORMAL
BDY SITE: ABNORMAL
BODY TEMPERATURE: 37
BODY TEMPERATURE: 37
CO2 BLDA-SCNC: 21.1 MMOL/L (ref 22–33)
CO2 BLDA-SCNC: 22.9 MMOL/L (ref 22–33)
DEPRECATED RDW RBC AUTO: 46.5 FL (ref 37–54)
EPAP: 0
EPAP: 0
ERYTHROCYTE [DISTWIDTH] IN BLOOD BY AUTOMATED COUNT: 16.1 % (ref 12.3–15.4)
HCO3 BLDCOA-SCNC: 19.8 MMOL/L (ref 16.9–20.5)
HCO3 BLDCOV-SCNC: 21.7 MMOL/L (ref 18.6–21.4)
HCT VFR BLD AUTO: 31.5 % (ref 34–46.6)
HGB BLD-MCNC: 9.9 G/DL (ref 12–15.9)
HGB BLDA-MCNC: 15.4 G/DL (ref 14–18)
HGB BLDA-MCNC: 16.2 G/DL (ref 14–18)
INHALED O2 CONCENTRATION: 21 %
INHALED O2 CONCENTRATION: 21 %
IPAP: 0
IPAP: 0
MCH RBC QN AUTO: 25.3 PG (ref 26.6–33)
MCHC RBC AUTO-ENTMCNC: 31.4 G/DL (ref 31.5–35.7)
MCV RBC AUTO: 80.6 FL (ref 79–97)
MODALITY: ABNORMAL
MODALITY: ABNORMAL
PAW @ PEAK INSP FLOW SETTING VENT: 0 CMH2O
PAW @ PEAK INSP FLOW SETTING VENT: 0 CMH2O
PCO2 BLDCOA: 41 MMHG (ref 43.3–54.9)
PCO2 BLDCOV: 40 MM HG (ref 28–40)
PH BLDCOA: 7.29 PH UNITS (ref 7.22–7.3)
PH BLDCOV: 7.34 PH UNITS (ref 7.31–7.37)
PLATELET # BLD AUTO: 203 10*3/MM3 (ref 140–450)
PMV BLD AUTO: 13.3 FL (ref 6–12)
PO2 BLDCOA: 39.2 MMHG (ref 11.5–43.3)
PO2 BLDCOV: 37.8 MM HG (ref 21–31)
RBC # BLD AUTO: 3.91 10*6/MM3 (ref 3.77–5.28)
SAO2 % BLDCOA: ABNORMAL %
TOTAL RATE: 0 BREATHS/MINUTE
TOTAL RATE: 0 BREATHS/MINUTE
VENTILATOR MODE: ABNORMAL
VENTILATOR MODE: ABNORMAL
WBC NRBC COR # BLD AUTO: 12.42 10*3/MM3 (ref 3.4–10.8)

## 2025-06-05 PROCEDURE — 25810000003 LACTATED RINGERS PER 1000 ML: Performed by: OBSTETRICS & GYNECOLOGY

## 2025-06-05 PROCEDURE — 88302 TISSUE EXAM BY PATHOLOGIST: CPT | Performed by: OBSTETRICS & GYNECOLOGY

## 2025-06-05 PROCEDURE — 59025 FETAL NON-STRESS TEST: CPT

## 2025-06-05 PROCEDURE — 82805 BLOOD GASES W/O2 SATURATION: CPT

## 2025-06-05 PROCEDURE — 25010000002 FENTANYL CITRATE (PF) 100 MCG/2ML SOLUTION: Performed by: NURSE ANESTHETIST, CERTIFIED REGISTERED

## 2025-06-05 PROCEDURE — 25010000002 BUPIVACAINE IN DEXTROSE 0.75-8.25 % SOLUTION: Performed by: NURSE ANESTHETIST, CERTIFIED REGISTERED

## 2025-06-05 PROCEDURE — 25010000002 METOCLOPRAMIDE PER 10 MG: Performed by: NURSE ANESTHETIST, CERTIFIED REGISTERED

## 2025-06-05 PROCEDURE — 59514 CESAREAN DELIVERY ONLY: CPT | Performed by: OBSTETRICS & GYNECOLOGY

## 2025-06-05 PROCEDURE — 59515 CESAREAN DELIVERY: CPT | Performed by: OBSTETRICS & GYNECOLOGY

## 2025-06-05 PROCEDURE — 25010000002 KETOROLAC TROMETHAMINE PER 15 MG: Performed by: OBSTETRICS & GYNECOLOGY

## 2025-06-05 PROCEDURE — 0DNW0ZZ RELEASE PERITONEUM, OPEN APPROACH: ICD-10-PCS | Performed by: OBSTETRICS & GYNECOLOGY

## 2025-06-05 PROCEDURE — 0UT70ZZ RESECTION OF BILATERAL FALLOPIAN TUBES, OPEN APPROACH: ICD-10-PCS | Performed by: OBSTETRICS & GYNECOLOGY

## 2025-06-05 PROCEDURE — 25010000002 FAMOTIDINE (PF) 20 MG/2ML SOLUTION: Performed by: NURSE ANESTHETIST, CERTIFIED REGISTERED

## 2025-06-05 PROCEDURE — 58611 LIGATE OVIDUCT(S) ADD-ON: CPT | Performed by: OBSTETRICS & GYNECOLOGY

## 2025-06-05 PROCEDURE — 25010000002 ONDANSETRON PER 1 MG: Performed by: NURSE ANESTHETIST, CERTIFIED REGISTERED

## 2025-06-05 PROCEDURE — 25010000002 CEFAZOLIN PER 500 MG: Performed by: OBSTETRICS & GYNECOLOGY

## 2025-06-05 PROCEDURE — 51703 INSERT BLADDER CATH COMPLEX: CPT

## 2025-06-05 PROCEDURE — 25010000002 MORPHINE PER 10 MG: Performed by: NURSE ANESTHETIST, CERTIFIED REGISTERED

## 2025-06-05 PROCEDURE — 63710000001 PROMETHAZINE PER 12.5 MG: Performed by: OBSTETRICS & GYNECOLOGY

## 2025-06-05 PROCEDURE — 25010000002 MIDAZOLAM PER 1 MG: Performed by: NURSE ANESTHETIST, CERTIFIED REGISTERED

## 2025-06-05 PROCEDURE — 85027 COMPLETE CBC AUTOMATED: CPT | Performed by: OBSTETRICS & GYNECOLOGY

## 2025-06-05 PROCEDURE — 25010000002 METHYLERGONOVINE MALEATE PER 0.2 MG: Performed by: NURSE ANESTHETIST, CERTIFIED REGISTERED

## 2025-06-05 DEVICE — FLOSEAL HEMOSTATIC MATRIX, 10ML
Type: IMPLANTABLE DEVICE | Site: UTERUS | Status: FUNCTIONAL
Brand: FLOSEAL HEMOSTATIC MATRIX

## 2025-06-05 DEVICE — HEMOST ABS SURGICEL SNOW 4X4IN: Type: IMPLANTABLE DEVICE | Site: UTERUS | Status: FUNCTIONAL

## 2025-06-05 RX ORDER — FAMOTIDINE 10 MG/ML
INJECTION, SOLUTION INTRAVENOUS AS NEEDED
Status: DISCONTINUED | OUTPATIENT
Start: 2025-06-05 | End: 2025-06-05 | Stop reason: SURG

## 2025-06-05 RX ORDER — ACETAMINOPHEN 325 MG/1
650 TABLET ORAL EVERY 6 HOURS
Status: DISCONTINUED | OUTPATIENT
Start: 2025-06-06 | End: 2025-06-05

## 2025-06-05 RX ORDER — NALOXONE HCL 0.4 MG/ML
0.4 VIAL (ML) INJECTION
Status: DISCONTINUED | OUTPATIENT
Start: 2025-06-05 | End: 2025-06-06

## 2025-06-05 RX ORDER — PRENATAL VIT/IRON FUM/FOLIC AC 27MG-0.8MG
1 TABLET ORAL DAILY
Status: DISCONTINUED | OUTPATIENT
Start: 2025-06-05 | End: 2025-06-08 | Stop reason: HOSPADM

## 2025-06-05 RX ORDER — SODIUM CHLORIDE 0.9 % (FLUSH) 0.9 %
10 SYRINGE (ML) INJECTION EVERY 12 HOURS SCHEDULED
Status: DISCONTINUED | OUTPATIENT
Start: 2025-06-05 | End: 2025-06-06

## 2025-06-05 RX ORDER — MISOPROSTOL 200 UG/1
600 TABLET ORAL AS NEEDED
Status: DISCONTINUED | OUTPATIENT
Start: 2025-06-05 | End: 2025-06-06

## 2025-06-05 RX ORDER — METHYLERGONOVINE MALEATE 0.2 MG/ML
INJECTION INTRAVENOUS
Status: COMPLETED
Start: 2025-06-05 | End: 2025-06-05

## 2025-06-05 RX ORDER — OXYTOCIN/0.9 % SODIUM CHLORIDE 30/500 ML
650 PLASTIC BAG, INJECTION (ML) INTRAVENOUS ONCE
Status: DISCONTINUED | OUTPATIENT
Start: 2025-06-05 | End: 2025-06-06

## 2025-06-05 RX ORDER — IBUPROFEN 600 MG/1
600 TABLET, FILM COATED ORAL EVERY 6 HOURS
Status: DISCONTINUED | OUTPATIENT
Start: 2025-06-07 | End: 2025-06-08 | Stop reason: HOSPADM

## 2025-06-05 RX ORDER — PROMETHAZINE HYDROCHLORIDE 12.5 MG/1
12.5 TABLET ORAL EVERY 6 HOURS PRN
Status: DISCONTINUED | OUTPATIENT
Start: 2025-06-05 | End: 2025-06-05

## 2025-06-05 RX ORDER — ONDANSETRON 2 MG/ML
4 INJECTION INTRAMUSCULAR; INTRAVENOUS ONCE AS NEEDED
Status: DISCONTINUED | OUTPATIENT
Start: 2025-06-05 | End: 2025-06-06

## 2025-06-05 RX ORDER — SODIUM CHLORIDE 0.9 % (FLUSH) 0.9 %
10 SYRINGE (ML) INJECTION AS NEEDED
Status: DISCONTINUED | OUTPATIENT
Start: 2025-06-05 | End: 2025-06-06

## 2025-06-05 RX ORDER — BUPIVACAINE HYDROCHLORIDE 7.5 MG/ML
INJECTION, SOLUTION INTRASPINAL AS NEEDED
Status: DISCONTINUED | OUTPATIENT
Start: 2025-06-05 | End: 2025-06-05 | Stop reason: SURG

## 2025-06-05 RX ORDER — SODIUM CHLORIDE, SODIUM LACTATE, POTASSIUM CHLORIDE, CALCIUM CHLORIDE 600; 310; 30; 20 MG/100ML; MG/100ML; MG/100ML; MG/100ML
125 INJECTION, SOLUTION INTRAVENOUS CONTINUOUS
Status: DISCONTINUED | OUTPATIENT
Start: 2025-06-05 | End: 2025-06-06

## 2025-06-05 RX ORDER — OXYCODONE HYDROCHLORIDE 5 MG/1
5 TABLET ORAL EVERY 4 HOURS PRN
Status: DISCONTINUED | OUTPATIENT
Start: 2025-06-05 | End: 2025-06-08 | Stop reason: HOSPADM

## 2025-06-05 RX ORDER — OXYCODONE HYDROCHLORIDE 10 MG/1
10 TABLET ORAL EVERY 4 HOURS PRN
Status: DISCONTINUED | OUTPATIENT
Start: 2025-06-05 | End: 2025-06-08 | Stop reason: HOSPADM

## 2025-06-05 RX ORDER — DIPHENHYDRAMINE HYDROCHLORIDE 50 MG/ML
25 INJECTION, SOLUTION INTRAMUSCULAR; INTRAVENOUS EVERY 4 HOURS PRN
Status: DISCONTINUED | OUTPATIENT
Start: 2025-06-05 | End: 2025-06-06

## 2025-06-05 RX ORDER — ACETAMINOPHEN 500 MG
1000 TABLET ORAL EVERY 6 HOURS
Status: DISCONTINUED | OUTPATIENT
Start: 2025-06-05 | End: 2025-06-05

## 2025-06-05 RX ORDER — FENTANYL CITRATE 50 UG/ML
INJECTION, SOLUTION INTRAMUSCULAR; INTRAVENOUS AS NEEDED
Status: DISCONTINUED | OUTPATIENT
Start: 2025-06-05 | End: 2025-06-05 | Stop reason: SURG

## 2025-06-05 RX ORDER — CITRIC ACID/SODIUM CITRATE 334-500MG
30 SOLUTION, ORAL ORAL ONCE
Status: COMPLETED | OUTPATIENT
Start: 2025-06-05 | End: 2025-06-05

## 2025-06-05 RX ORDER — ONDANSETRON 4 MG/1
4 TABLET, ORALLY DISINTEGRATING ORAL EVERY 8 HOURS PRN
Status: DISCONTINUED | OUTPATIENT
Start: 2025-06-05 | End: 2025-06-08 | Stop reason: HOSPADM

## 2025-06-05 RX ORDER — LIDOCAINE HYDROCHLORIDE 10 MG/ML
0.5 INJECTION, SOLUTION EPIDURAL; INFILTRATION; INTRACAUDAL; PERINEURAL ONCE AS NEEDED
Status: DISCONTINUED | OUTPATIENT
Start: 2025-06-05 | End: 2025-06-06

## 2025-06-05 RX ORDER — OXYTOCIN/0.9 % SODIUM CHLORIDE 30/500 ML
125 PLASTIC BAG, INJECTION (ML) INTRAVENOUS ONCE AS NEEDED
Status: DISCONTINUED | OUTPATIENT
Start: 2025-06-05 | End: 2025-06-06

## 2025-06-05 RX ORDER — KETOROLAC TROMETHAMINE 15 MG/ML
15 INJECTION, SOLUTION INTRAMUSCULAR; INTRAVENOUS EVERY 6 HOURS
Status: COMPLETED | OUTPATIENT
Start: 2025-06-06 | End: 2025-06-06

## 2025-06-05 RX ORDER — ONDANSETRON 2 MG/ML
INJECTION INTRAMUSCULAR; INTRAVENOUS AS NEEDED
Status: DISCONTINUED | OUTPATIENT
Start: 2025-06-05 | End: 2025-06-05 | Stop reason: SURG

## 2025-06-05 RX ORDER — KETOROLAC TROMETHAMINE 30 MG/ML
30 INJECTION, SOLUTION INTRAMUSCULAR; INTRAVENOUS ONCE
Status: COMPLETED | OUTPATIENT
Start: 2025-06-05 | End: 2025-06-05

## 2025-06-05 RX ORDER — CALCIUM CARBONATE 500 MG/1
1 TABLET, CHEWABLE ORAL EVERY 4 HOURS PRN
Status: DISCONTINUED | OUTPATIENT
Start: 2025-06-05 | End: 2025-06-08 | Stop reason: HOSPADM

## 2025-06-05 RX ORDER — METHYLERGONOVINE MALEATE 0.2 MG/1
200 TABLET ORAL EVERY 4 HOURS
Status: DISCONTINUED | OUTPATIENT
Start: 2025-06-05 | End: 2025-06-06

## 2025-06-05 RX ORDER — DOCUSATE SODIUM 100 MG/1
100 CAPSULE, LIQUID FILLED ORAL 2 TIMES DAILY PRN
Status: DISCONTINUED | OUTPATIENT
Start: 2025-06-05 | End: 2025-06-08 | Stop reason: HOSPADM

## 2025-06-05 RX ORDER — HYDROCORTISONE 25 MG/G
1 CREAM TOPICAL AS NEEDED
Status: DISCONTINUED | OUTPATIENT
Start: 2025-06-05 | End: 2025-06-08 | Stop reason: HOSPADM

## 2025-06-05 RX ORDER — PROMETHAZINE HYDROCHLORIDE 12.5 MG/1
12.5 SUPPOSITORY RECTAL EVERY 6 HOURS PRN
Status: DISCONTINUED | OUTPATIENT
Start: 2025-06-05 | End: 2025-06-06

## 2025-06-05 RX ORDER — OXYTOCIN/0.9 % SODIUM CHLORIDE 30/500 ML
85 PLASTIC BAG, INJECTION (ML) INTRAVENOUS ONCE
Status: DISCONTINUED | OUTPATIENT
Start: 2025-06-05 | End: 2025-06-06

## 2025-06-05 RX ORDER — DIPHENHYDRAMINE HYDROCHLORIDE 50 MG/ML
25 INJECTION, SOLUTION INTRAMUSCULAR; INTRAVENOUS ONCE AS NEEDED
Status: DISCONTINUED | OUTPATIENT
Start: 2025-06-05 | End: 2025-06-06

## 2025-06-05 RX ORDER — MORPHINE SULFATE 0.5 MG/ML
INJECTION, SOLUTION EPIDURAL; INTRATHECAL; INTRAVENOUS AS NEEDED
Status: DISCONTINUED | OUTPATIENT
Start: 2025-06-05 | End: 2025-06-05 | Stop reason: SURG

## 2025-06-05 RX ORDER — MISOPROSTOL 200 UG/1
800 TABLET ORAL AS NEEDED
Status: DISCONTINUED | OUTPATIENT
Start: 2025-06-05 | End: 2025-06-05

## 2025-06-05 RX ORDER — DIPHENHYDRAMINE HCL 25 MG
25 CAPSULE ORAL EVERY 4 HOURS PRN
Status: DISCONTINUED | OUTPATIENT
Start: 2025-06-05 | End: 2025-06-06

## 2025-06-05 RX ORDER — METOCLOPRAMIDE HYDROCHLORIDE 5 MG/ML
INJECTION INTRAMUSCULAR; INTRAVENOUS AS NEEDED
Status: DISCONTINUED | OUTPATIENT
Start: 2025-06-05 | End: 2025-06-05 | Stop reason: SURG

## 2025-06-05 RX ORDER — TRANEXAMIC ACID 10 MG/ML
INJECTION, SOLUTION INTRAVENOUS AS NEEDED
Status: DISCONTINUED | OUTPATIENT
Start: 2025-06-05 | End: 2025-06-05 | Stop reason: SURG

## 2025-06-05 RX ORDER — METHYLERGONOVINE MALEATE 0.2 MG/ML
INJECTION INTRAVENOUS AS NEEDED
Status: DISCONTINUED | OUTPATIENT
Start: 2025-06-05 | End: 2025-06-05 | Stop reason: SURG

## 2025-06-05 RX ORDER — SODIUM CHLORIDE 9 MG/ML
40 INJECTION, SOLUTION INTRAVENOUS AS NEEDED
Status: DISCONTINUED | OUTPATIENT
Start: 2025-06-05 | End: 2025-06-06

## 2025-06-05 RX ORDER — ACETAMINOPHEN 325 MG/1
650 TABLET ORAL EVERY 6 HOURS
Status: DISCONTINUED | OUTPATIENT
Start: 2025-06-06 | End: 2025-06-08 | Stop reason: HOSPADM

## 2025-06-05 RX ORDER — MIDAZOLAM HYDROCHLORIDE 1 MG/ML
INJECTION, SOLUTION INTRAMUSCULAR; INTRAVENOUS AS NEEDED
Status: DISCONTINUED | OUTPATIENT
Start: 2025-06-05 | End: 2025-06-05 | Stop reason: SURG

## 2025-06-05 RX ORDER — PROMETHAZINE HYDROCHLORIDE 12.5 MG/1
12.5 TABLET ORAL EVERY 4 HOURS PRN
Status: DISCONTINUED | OUTPATIENT
Start: 2025-06-05 | End: 2025-06-08 | Stop reason: HOSPADM

## 2025-06-05 RX ORDER — METHYLERGONOVINE MALEATE 0.2 MG/ML
200 INJECTION INTRAVENOUS ONCE AS NEEDED
Status: DISCONTINUED | OUTPATIENT
Start: 2025-06-05 | End: 2025-06-05

## 2025-06-05 RX ORDER — SIMETHICONE 80 MG
80 TABLET,CHEWABLE ORAL 4 TIMES DAILY PRN
Status: DISCONTINUED | OUTPATIENT
Start: 2025-06-05 | End: 2025-06-08 | Stop reason: HOSPADM

## 2025-06-05 RX ORDER — CARBOPROST TROMETHAMINE 250 UG/ML
250 INJECTION, SOLUTION INTRAMUSCULAR AS NEEDED
Status: DISCONTINUED | OUTPATIENT
Start: 2025-06-05 | End: 2025-06-06

## 2025-06-05 RX ORDER — METHYLERGONOVINE MALEATE 0.2 MG/ML
200 INJECTION INTRAVENOUS AS NEEDED
Status: DISCONTINUED | OUTPATIENT
Start: 2025-06-05 | End: 2025-06-06

## 2025-06-05 RX ORDER — ACETAMINOPHEN 500 MG
1000 TABLET ORAL EVERY 6 HOURS
Status: COMPLETED | OUTPATIENT
Start: 2025-06-05 | End: 2025-06-06

## 2025-06-05 RX ORDER — ALUMINA, MAGNESIA, AND SIMETHICONE 2400; 2400; 240 MG/30ML; MG/30ML; MG/30ML
15 SUSPENSION ORAL EVERY 4 HOURS PRN
Status: DISCONTINUED | OUTPATIENT
Start: 2025-06-05 | End: 2025-06-08 | Stop reason: HOSPADM

## 2025-06-05 RX ORDER — ACETAMINOPHEN 500 MG
1000 TABLET ORAL ONCE
Status: COMPLETED | OUTPATIENT
Start: 2025-06-05 | End: 2025-06-05

## 2025-06-05 RX ADMIN — FAMOTIDINE 20 MG: 10 INJECTION, SOLUTION INTRAVENOUS at 12:26

## 2025-06-05 RX ADMIN — METOCLOPRAMIDE 10 MG: 5 INJECTION, SOLUTION INTRAMUSCULAR; INTRAVENOUS at 12:40

## 2025-06-05 RX ADMIN — METHYLERGONOVINE MALEATE 200 MCG: 0.2 TABLET ORAL at 20:00

## 2025-06-05 RX ADMIN — SODIUM CITRATE AND CITRIC ACID MONOHYDRATE 30 ML: 500; 334 SOLUTION ORAL at 12:20

## 2025-06-05 RX ADMIN — PROMETHAZINE HYDROCHLORIDE 12.5 MG: 12.5 TABLET ORAL at 15:28

## 2025-06-05 RX ADMIN — SODIUM CHLORIDE, POTASSIUM CHLORIDE, SODIUM LACTATE AND CALCIUM CHLORIDE: 600; 310; 30; 20 INJECTION, SOLUTION INTRAVENOUS at 12:54

## 2025-06-05 RX ADMIN — ACETAMINOPHEN 1000 MG: 500 TABLET ORAL at 11:27

## 2025-06-05 RX ADMIN — TRANEXAMIC ACID 1000 MG: 10 INJECTION, SOLUTION INTRAVENOUS at 13:07

## 2025-06-05 RX ADMIN — ACETAMINOPHEN 1000 MG: 500 TABLET ORAL at 20:01

## 2025-06-05 RX ADMIN — SODIUM CHLORIDE, POTASSIUM CHLORIDE, SODIUM LACTATE AND CALCIUM CHLORIDE 125 ML/HR: 600; 310; 30; 20 INJECTION, SOLUTION INTRAVENOUS at 11:22

## 2025-06-05 RX ADMIN — METHYLERGONOVINE MALEATE 200 MCG: 0.2 INJECTION, SOLUTION INTRAMUSCULAR; INTRAVENOUS at 13:46

## 2025-06-05 RX ADMIN — BUPIVACAINE HYDROCHLORIDE IN DEXTROSE 1.4 ML: 7.5 INJECTION, SOLUTION SUBARACHNOID at 12:32

## 2025-06-05 RX ADMIN — MIDAZOLAM HYDROCHLORIDE 1 MG: 1 INJECTION, SOLUTION INTRAMUSCULAR; INTRAVENOUS at 12:26

## 2025-06-05 RX ADMIN — SODIUM CHLORIDE, POTASSIUM CHLORIDE, SODIUM LACTATE AND CALCIUM CHLORIDE 125 ML/HR: 600; 310; 30; 20 INJECTION, SOLUTION INTRAVENOUS at 12:20

## 2025-06-05 RX ADMIN — ONDANSETRON 4 MG: 2 INJECTION INTRAMUSCULAR; INTRAVENOUS at 12:26

## 2025-06-05 RX ADMIN — MORPHINE SULFATE 150 MCG: 0.5 INJECTION, SOLUTION EPIDURAL; INTRATHECAL; INTRAVENOUS at 12:32

## 2025-06-05 RX ADMIN — SODIUM CHLORIDE 2 G: 900 INJECTION INTRAVENOUS at 12:20

## 2025-06-05 RX ADMIN — FENTANYL CITRATE 20 MCG: 50 INJECTION, SOLUTION INTRAMUSCULAR; INTRAVENOUS at 12:32

## 2025-06-05 RX ADMIN — KETOROLAC TROMETHAMINE 30 MG: 30 INJECTION, SOLUTION INTRAMUSCULAR; INTRAVENOUS at 15:28

## 2025-06-05 NOTE — INTERVAL H&P NOTE
H&P updated. The patient was examined and patient desires bilateral salpingectomy a the time of her  section today. We reviewed permanency of this procedure and sterilization and she verbalizes understanding and agrees. Plan to proceed with repeat  section and bilateral salpingectomy. Consents updated and initialed and signed.   Tootie Maher MD

## 2025-06-05 NOTE — ANESTHESIA PREPROCEDURE EVALUATION
Anesthesia Evaluation     Patient summary reviewed and Nursing notes reviewed   NPO Solid Status: > 8 hours  NPO Liquid Status: > 2 hours           Airway   Mallampati: II  TM distance: >3 FB  Neck ROM: full  Dental      Pulmonary - negative pulmonary ROS   Cardiovascular - negative cardio ROS        Neuro/Psych- negative ROS  GI/Hepatic/Renal/Endo    (+) diabetes mellitus gestational using insulin    Musculoskeletal (-) negative ROS    Abdominal    Substance History - negative use     OB/GYN    (+) Pregnant        Other - negative ROS                   Anesthesia Plan    ASA 3     ITN and spinal       Anesthetic plan, risks, benefits, and alternatives have been provided, discussed and informed consent has been obtained with: patient.    Use of blood products discussed with patient .    Plan discussed with attending.    CODE STATUS:

## 2025-06-05 NOTE — ANESTHESIA PROCEDURE NOTES
Spinal Block      Patient reassessed immediately prior to procedure    Patient location during procedure: OR  Indication:at surgeon's request  Performed By  CRNA/AFTAB: Sandra Downey CRNA  Preanesthetic Checklist  Completed: patient identified, IV checked, site marked, risks and benefits discussed, surgical consent, monitors and equipment checked, pre-op evaluation and timeout performed  Spinal Block Prep:  Patient Position:sitting  Sterile Tech:cap, gloves, mask and sterile barriers  Prep:Betadine  Patient Monitoring:blood pressure monitoring, continuous pulse oximetry and EKG    Spinal Block Procedure  Approach:midline  Guidance:palpation technique  Location:L3-L4  Needle Type:Alpa  Needle Gauge:25 G  Placement of Spinal needle event:cerebrospinal fluid aspirated  Paresthesia: no  Fluid Appearance:clear     Post Assessment  Patient Tolerance:patient tolerated the procedure well with no apparent complications  Complications no

## 2025-06-05 NOTE — OP NOTE
PATRICIO Margot Nance  : 1990  MRN: [id number]  CSN: [id number]     Section Operative Note    Pre-Operative Dx:   Intrauterine pregnancy at 39w0d weeks   IUGR by AC  GDMA2  AMA  History of  section       Postoperative dx:    Intrauterine pregnancy at 39w0d weeks   Same + delivered  Extensive adhesion of rectus muscle and peritoneum to anterior uterus involving extensive lysis of adhesions        Procedure: Repeat  (LTCS)  - 2 layer closure with bilateral total salpingectomy and lysis of adhesions        Surgeon: Tootie Maher MD   Assistant: Isaac Lundberg MD   was responsible for performing the following activities: Retraction, Suction, Irrigation, Suturing, Closing, and Delivery of Fetus and their skilled assistance was necessary for the success of this case.         Anesthesia: Spinal       EBL: 1399 mls.   IV Fluids: 1600 mls.   UOP: 200 mls.     Antibiotics: cefazolin 2 gms     Infant:           Gender: male infant    Weight: 3013 g (6 lb 10.3 oz)    Apgars: 7  @ 1 minute / 9  @ 5 minutes     Procedural findings: Rectus and fascial adhesion to anterior uterus.  Otherwise normal-appearing bilateral ovaries.  Right fallopian tube was slightly tortuous and there is probably a small area in the midportion that was not entirely removed during the sterilization procedure.    Procedure Details:   After the patient was adequately anesthetized, she was sterilely prepped and draped in the dorsal supine left lateral tilt position. A Pfannenstiel incision was created sharply with the knife. It was carried down to the fascia with the Bovie.  The fascia was cut transversely with the knife and extended in a curvilinear fashion with scissors. The fascia was freed from its midline insertion superiorly and inferiorly. Rectus muscles were  in the midline.  There was extensive adhesions of the rectus muscle to the anterior uterus.  The peritoneum was sharply entered but  there was still an extensive adhesion of the rectus muscle to the anterior uterus.  This was carefully taken down with the Bovie and eventually the thicker part of the adhesion was crossclamped with 2 hemostats and scored in the midline.  The lower uterine segment was scored transversely with the knife. Clear amniotic fluid was seen. The infant's was in vertex presentation.  The head was delivered atraumatically. The mouth and nose were bulb suctioned.  The cord was clamped and the infant was handed to the delivery team which was in attendance. The placenta was spontaneously extracted. The uterus was exteriorized and wiped free of debris and clot. The uterine incision was closed with #1 chromic in a continuous running locking fashion.      The tubes were traced to their fimbriated end.  Enseal device was used to transect each tube near the cornua and at the distal end near the fimbria ovarica.  Both tubes were removed in their entirety with minimal blood loss.    Most of the anterior uterus was brought edges with intermittent areas of bleeding.  Rest #1 chromic figure-of-eight sutures were used on the anterior uterus to create hemostasis.  Floseal with pressure held for 5 minutes x 2 different instances was used to achieve hemostasis in addition to use of Bovie cautery.  Through the procedure the bladder was well away from the surgical sites.  The urine remained clear throughout the entirety of the procedure.     The uterus was returned to the abdomen. The paracolic gutters were cleared of debris and clot.  Surgicel foam x 2 was applied over the uterus hysterotomy site.  The fascia was closed with 0 PDS sewing in a running unlocked fashion. The subcutaneous tissue was copiously irrigated. Xochilt's fascia was closed with 3-0 plain gut and the skin was closed with 4-0 monocryl subcuticularly.  Steri-Strips with Mastisol were applied.  All counts were correct.         Complications:   None      Disposition:   Mother to  Mother Baby/Postpartum  in stable condition currently.   Baby to NBN  in stable condition currently.       Tootie Maher MD  6/5/2025  18:38 EDT

## 2025-06-05 NOTE — ANESTHESIA POSTPROCEDURE EVALUATION
Patient: Aye Nance    Procedure Summary       Date: 25 Room / Location:  [facility] DELIVERY   [facility]    Anesthesia Start: 1225 Anesthesia Stop: 1406    Procedure:  SECTION REPEAT (Abdomen) Diagnosis:       History of  delivery      High-risk pregnancy in second trimester      (History of  delivery [Z98.891])      (High-risk pregnancy in second trimester [O09.92])    Surgeons: Tootie Maher MD Provider: Preet Martínez DO    Anesthesia Type: ITN, spinal ASA Status: 3            Anesthesia Type: ITN, spinal    Vitals  Vitals Value Taken Time   BP 95/67 25 14:03   Temp 97.6    Pulse 56 25 14:05   Resp 16    SpO2 100 % 25 14:05   Vitals shown include unfiled device data.        Post Anesthesia Care and Evaluation    Patient location during evaluation: bedside  Patient participation: complete - patient participated  Level of consciousness: awake and alert  Pain management: adequate    Airway patency: patent  Anesthetic complications: No anesthetic complications    Cardiovascular status: acceptable  Respiratory status: acceptable  Hydration status: acceptable

## 2025-06-06 LAB
BASOPHILS # BLD AUTO: 0.02 10*3/MM3 (ref 0–0.2)
BASOPHILS NFR BLD AUTO: 0.2 % (ref 0–1.5)
DEPRECATED RDW RBC AUTO: 45.4 FL (ref 37–54)
EOSINOPHIL # BLD AUTO: 0.01 10*3/MM3 (ref 0–0.4)
EOSINOPHIL NFR BLD AUTO: 0.1 % (ref 0.3–6.2)
ERYTHROCYTE [DISTWIDTH] IN BLOOD BY AUTOMATED COUNT: 15.9 % (ref 12.3–15.4)
HCT VFR BLD AUTO: 28.8 % (ref 34–46.6)
HGB BLD-MCNC: 9 G/DL (ref 12–15.9)
IMM GRANULOCYTES # BLD AUTO: 0.04 10*3/MM3 (ref 0–0.05)
IMM GRANULOCYTES NFR BLD AUTO: 0.4 % (ref 0–0.5)
LYMPHOCYTES # BLD AUTO: 1.69 10*3/MM3 (ref 0.7–3.1)
LYMPHOCYTES NFR BLD AUTO: 17 % (ref 19.6–45.3)
MCH RBC QN AUTO: 25 PG (ref 26.6–33)
MCHC RBC AUTO-ENTMCNC: 31.3 G/DL (ref 31.5–35.7)
MCV RBC AUTO: 80 FL (ref 79–97)
MONOCYTES # BLD AUTO: 0.65 10*3/MM3 (ref 0.1–0.9)
MONOCYTES NFR BLD AUTO: 6.5 % (ref 5–12)
NEUTROPHILS NFR BLD AUTO: 7.54 10*3/MM3 (ref 1.7–7)
NEUTROPHILS NFR BLD AUTO: 75.8 % (ref 42.7–76)
NRBC BLD AUTO-RTO: 0 /100 WBC (ref 0–0.2)
PLATELET # BLD AUTO: 178 10*3/MM3 (ref 140–450)
PMV BLD AUTO: 13.6 FL (ref 6–12)
RBC # BLD AUTO: 3.6 10*6/MM3 (ref 3.77–5.28)
WBC NRBC COR # BLD AUTO: 9.95 10*3/MM3 (ref 3.4–10.8)

## 2025-06-06 PROCEDURE — 85025 COMPLETE CBC W/AUTO DIFF WBC: CPT | Performed by: OBSTETRICS & GYNECOLOGY

## 2025-06-06 PROCEDURE — 25010000002 KETOROLAC TROMETHAMINE PER 15 MG: Performed by: OBSTETRICS & GYNECOLOGY

## 2025-06-06 PROCEDURE — 25810000003 LACTATED RINGERS SOLUTION: Performed by: OBSTETRICS & GYNECOLOGY

## 2025-06-06 PROCEDURE — 25810000003 LACTATED RINGERS PER 1000 ML: Performed by: OBSTETRICS & GYNECOLOGY

## 2025-06-06 RX ADMIN — ACETAMINOPHEN 650 MG: 325 TABLET ORAL at 20:20

## 2025-06-06 RX ADMIN — ACETAMINOPHEN 1000 MG: 500 TABLET ORAL at 08:59

## 2025-06-06 RX ADMIN — SODIUM CHLORIDE, POTASSIUM CHLORIDE, SODIUM LACTATE AND CALCIUM CHLORIDE 125 ML/HR: 600; 310; 30; 20 INJECTION, SOLUTION INTRAVENOUS at 01:07

## 2025-06-06 RX ADMIN — SODIUM CHLORIDE, POTASSIUM CHLORIDE, SODIUM LACTATE AND CALCIUM CHLORIDE 500 ML: 600; 310; 30; 20 INJECTION, SOLUTION INTRAVENOUS at 08:05

## 2025-06-06 RX ADMIN — METHYLERGONOVINE MALEATE 200 MCG: 0.2 TABLET ORAL at 00:00

## 2025-06-06 RX ADMIN — KETOROLAC TROMETHAMINE 15 MG: 15 INJECTION, SOLUTION INTRAMUSCULAR; INTRAVENOUS at 00:00

## 2025-06-06 RX ADMIN — KETOROLAC TROMETHAMINE 15 MG: 15 INJECTION, SOLUTION INTRAMUSCULAR; INTRAVENOUS at 06:17

## 2025-06-06 RX ADMIN — KETOROLAC TROMETHAMINE 15 MG: 15 INJECTION, SOLUTION INTRAMUSCULAR; INTRAVENOUS at 18:30

## 2025-06-06 RX ADMIN — KETOROLAC TROMETHAMINE 15 MG: 15 INJECTION, SOLUTION INTRAMUSCULAR; INTRAVENOUS at 11:59

## 2025-06-06 RX ADMIN — ACETAMINOPHEN 1000 MG: 500 TABLET ORAL at 15:10

## 2025-06-06 RX ADMIN — ACETAMINOPHEN 1000 MG: 500 TABLET ORAL at 03:47

## 2025-06-06 RX ADMIN — METHYLERGONOVINE MALEATE 200 MCG: 0.2 TABLET ORAL at 03:47

## 2025-06-06 NOTE — PROGRESS NOTES
PATRICIO Frost  Aye Nance  : 1990  MRN: [id number]  CSN: [id number]    Hospital Day: 2    Post-operative Day #1  Subjective       CC: hospital follow-up    Her pain is well controlled. Vaginal bleeding is normal in amount. She is ambulating without difficulty. Her baby is doing well.     Objective     Min/max vitals past 24 hours:   Temp  Min: 97.4 °F (36.3 °C)  Max: 99.1 °F (37.3 °C)  BP  Min: 94/51  Max: 116/72  Pulse  Min: 49  Max: 79  Resp  Min: 16  Max: 16        General: well developed; well nourished  no acute distress   Abdomen: soft, non-tender; no masses  no umbilical or inguinal hernias are present  no hepato-splenomegaly  incision is clean, dry, intact, and without drainage   Pelvic: Not performed   Ext: Calves NT     Last 3 values   Results from last 7 days   Lab Units 25  0455 25  1927 25  1500   WBC 10*3/mm3 9.95 12.42* 7.68   HEMOGLOBIN g/dL 9.0* 9.9* 10.0*   HEMATOCRIT % 28.8* 31.5* 31.7*   PLATELETS 10*3/mm3 178 203 192     Lab Results   Component Value Date    RH Positive 2025    HEPBSAG Non-Reactive 2024     Results from last 7 days   Lab Units 25  1500   TREPONEMA PALLIDUM AB TOTAL  Non-Reactive            Assessment   POD #1 S/P Repeat  (LTCS) with bilateral total salpingectomy and extensive COCO  Postpartum anemia     Plan   Continue routine post-operative care  Circumcision was discussed.  It was explained to Aye that the procedure is elective.  There are probably no long-term medical benefits for circumcision.  Studies have suggested in the first year of life, there may be a reduction in urinary tract infections in a circumcised male.  This difference disappears after the first year of life.  Some have suggested that circumcision reduces nerve endings and the tip of the penis which could have an impact to him as he ages.  There are small risks of the procedure including taking off to much or too little skin from the foreskin.   He will be anesthetized and most of the time this is successful.  It cannot be guaranteed that the child will feel no pain.  After hearing this information, she wishes to have her son circumsized.      Alex Cortez MD  6/6/2025  05:55 EDT

## 2025-06-06 NOTE — ANESTHESIA POSTPROCEDURE EVALUATION
Patient: Aye Nance    Procedure Summary       Date: 25 Room / Location: [facility] LABOR DELIVERY   [facility]    Anesthesia Start: 1225 Anesthesia Stop: 1406    Procedures:        SECTION REPEAT (Abdomen)      SALPINGECTOMY (Bilateral: Abdomen) Diagnosis:       History of  delivery      High-risk pregnancy in second trimester      (History of  delivery [Z98.891])      (High-risk pregnancy in second trimester [O09.92])    Surgeons: Tootie Maher MD Provider: Preet Martínez DO    Anesthesia Type: ITN, spinal ASA Status: 3            Anesthesia Type: ITN, spinal    Vitals  Vitals Value Taken Time   /55 25 11:10   Temp 98.9 °F (37.2 °C) 25 11:10   Pulse 75 25 11:10   Resp 18 25 11:10   SpO2 100 % 25 16:05   Vitals shown include unfiled device data.        Post Anesthesia Care and Evaluation    Patient location during evaluation: bedside  Patient participation: complete - patient participated  Level of consciousness: awake and alert  Pain management: adequate    Airway patency: patent  Anesthetic complications: No anesthetic complications    Cardiovascular status: acceptable  Respiratory status: acceptable  Hydration status: acceptable  Post Neuraxial Block status: Motor and sensory function returned to baseline and No signs or symptoms of PDPH

## 2025-06-07 PROCEDURE — 0503F POSTPARTUM CARE VISIT: CPT | Performed by: ADVANCED PRACTICE MIDWIFE

## 2025-06-07 RX ADMIN — IBUPROFEN 600 MG: 600 TABLET ORAL at 17:45

## 2025-06-07 RX ADMIN — IBUPROFEN 600 MG: 600 TABLET ORAL at 23:20

## 2025-06-07 RX ADMIN — IBUPROFEN 600 MG: 600 TABLET ORAL at 11:36

## 2025-06-07 RX ADMIN — ACETAMINOPHEN 650 MG: 325 TABLET ORAL at 21:32

## 2025-06-07 RX ADMIN — IBUPROFEN 600 MG: 600 TABLET ORAL at 05:29

## 2025-06-07 RX ADMIN — ACETAMINOPHEN 650 MG: 325 TABLET ORAL at 14:10

## 2025-06-07 RX ADMIN — ACETAMINOPHEN 650 MG: 325 TABLET ORAL at 08:40

## 2025-06-07 RX ADMIN — IBUPROFEN 600 MG: 600 TABLET ORAL at 00:03

## 2025-06-07 RX ADMIN — ACETAMINOPHEN 650 MG: 325 TABLET ORAL at 02:23

## 2025-06-07 NOTE — PROGRESS NOTES
Postpartum Progress Note    Patient name: Aye Nance  YOB: 1990   MRN: [id number]  Referring Provider: Tootie Maher MD  Admission Date: 2025  Date of Service: 2025    ID: 34 y.o.     Diagnosis:   S/p  delivery 2 Days Post-Op     Postpartum care following rLTC with lysis of adhesion and bilateral salpingectomy 25    High-risk pregnancy in third trimester    History of  delivery    Previous  delivery affecting pregnancy    History of  section complicating pregnancy       Subjective:      No complaints.  Moderate lochia.  Ambulating, voiding, tolerating diet.  Pain moderately controlled with tylenol/ibuprofen. She does not like the ways oxycodone makes her feel.   The patient is not currently breastfeeding.   This baby is a boy and she does desire circumcision.    Objective:      Vital signs:  Vital Signs Range for the last 24 hours  Temperature: Temp:  [97.7 °F (36.5 °C)-99.1 °F (37.3 °C)] 99.1 °F (37.3 °C)   Temp Source: Temp src: Oral   BP: BP: ()/(52-55) 103/53   Pulse: Heart Rate:  [] 106   Respirations: Resp:  [16-18] 18   Weight: 88 kg (194 lb)     General: Alert & oriented x4, in no apparent distress  Abdomen: soft, nontender  Uterus: firm, nontender  Incision: clean, dry, intact  Extremities: nontender; mild edema      Labs:  Lab Results   Component Value Date    WBC 9.95 2025    HGB 9.0 (L) 2025    HCT 28.8 (L) 2025    MCV 80.0 2025     2025     Results from last 7 days   Lab Units 25  1500   ABO TYPING  O   RH TYPING  Positive     External Prenatal Results       Pregnancy Outside Results - Transcribed From Office Records - See Scanned Records For Details       Test Value Date Time    ABO  O  25 1500    Rh  Positive  25 1500    Antibody Screen  Negative  25 1500       Negative  24 1618    Varicella IgG       Rubella  1.36 index 24 1618    Hgb   9.0 g/dL 06/06/25 0455       9.9 g/dL 06/05/25 1927       10.0 g/dL 06/03/25 1500       10.8 g/dL 11/14/24 1618    Hct  28.8 % 06/06/25 0455       31.5 % 06/05/25 1927       31.7 % 06/03/25 1500       33.9 % 11/14/24 1618    HgB A1c        1h GTT  157 mg/dL 12/12/24 0924    3h GTT Fasting       3h GTT 1 hour       3h GTT 2 hour       3h GTT 3 hour        Gonorrhea (discrete) ^ negative  11/14/24     Chlamydia (discrete) ^ negative  11/14/24     RPR  Non-Reactive  11/14/24 1618    Syphils cascade: TP-Ab (FTA)  Non-Reactive  06/03/25 1500    TP-Ab  Non-Reactive  06/03/25 1500    TP-Ab (EIA)       TPPA       HBsAg  Non-Reactive  11/14/24 1618    Herpes Simplex Virus PCR       Herpes Simplex VIrus Culture       HIV  Non-Reactive  11/14/24 1618    Hep C RNA Quant PCR       Hep C Antibody  Non-Reactive  11/14/24 1618    AFP       NIPT ^ Low risk male  12/12/24     Cystic Fibrosis (Afshin)       Cystic Fibroisis        Spinal Muscular atrophy       Fragile X       Group B Strep ^ negative  05/15/25       ^ error  11/14/24       ^ negative  11/14/24     GBS Susceptibility to Clindamycin       GBS Susceptibility to Erythromycin       Fetal Fibronectin       Genetic Testing, Maternal Blood                 Drug Screening       Test Value Date Time    Urine Drug Screen       Amphetamine Screen  Negative  11/14/24 1258    Barbiturate Screen  Negative  11/14/24 1258    Benzodiazepine Screen  Negative  11/14/24 1258    Methadone Screen  Negative  11/14/24 1258    Phencyclidine Screen  Negative  11/14/24 1258    Opiates Screen  Negative  11/14/24 1258    THC Screen  Negative  11/14/24 1258    Cocaine Screen       Propoxyphene Screen       Buprenorphine Screen  Negative  11/14/24 1258    Methamphetamine Screen       Oxycodone Screen  Negative  11/14/24 1258    Tricyclic Antidepressants Screen  Negative  11/14/24 1258              Legend    ^: Historical                            Assessment/Plan:      2 Days Post-Op s/p  Procedure(s):   SECTION REPEAT  SALPINGECTOMY  1. S/p  delivery: Continue postoperative care.  Doing well.  2. Infant feeding: Supportive care.  The patient is not currently breastfeeding.  3. Asymptomatic anemia: Rpt CBC in AM.

## 2025-06-07 NOTE — PLAN OF CARE
Goal Outcome Evaluation:  Plan of Care Reviewed With: patient           Outcome Evaluation: pt doing ok throughout shift. Vitals wnl. Bleeding wnl. Pain controlled with ERAS meds. Bonding well with infant. C/o watery stools since delivery. Denies needs for medication to treat. Questions encouraged and answered. No further concerns noted.

## 2025-06-08 VITALS
TEMPERATURE: 98.4 F | HEIGHT: 63 IN | BODY MASS INDEX: 34.38 KG/M2 | DIASTOLIC BLOOD PRESSURE: 80 MMHG | RESPIRATION RATE: 16 BRPM | HEART RATE: 63 BPM | OXYGEN SATURATION: 99 % | SYSTOLIC BLOOD PRESSURE: 120 MMHG | WEIGHT: 194 LBS

## 2025-06-08 PROCEDURE — 0503F POSTPARTUM CARE VISIT: CPT | Performed by: ADVANCED PRACTICE MIDWIFE

## 2025-06-08 RX ORDER — IBUPROFEN 600 MG/1
600 TABLET, FILM COATED ORAL EVERY 6 HOURS
Qty: 90 TABLET | Refills: 0 | Status: SHIPPED | OUTPATIENT
Start: 2025-06-08

## 2025-06-08 RX ADMIN — IBUPROFEN 600 MG: 600 TABLET ORAL at 05:48

## 2025-06-08 RX ADMIN — ACETAMINOPHEN 650 MG: 325 TABLET ORAL at 08:45

## 2025-06-08 RX ADMIN — IBUPROFEN 600 MG: 600 TABLET ORAL at 11:38

## 2025-06-08 RX ADMIN — ACETAMINOPHEN 650 MG: 325 TABLET ORAL at 03:05

## 2025-06-08 NOTE — PLAN OF CARE
Goal Outcome Evaluation:           Progress: improving  Outcome Evaluation: vss, voids, passing flatus, bleeding light amt. Inc. intact without drainage or redness. ERAS meds for pain control

## 2025-06-08 NOTE — DISCHARGE SUMMARY
Discharge Summary    Date of Admission: 2025  Date of Discharge:  2025      Patient: Aye Nance      MR#:[id number]    Primary Surgeon/OB: Tootie Maher MD    Discharge Surgeon/OB: Diego    Presenting Problem/History of Present Illness  History of  delivery [Z98.891]  High-risk pregnancy in second trimester [O09.92]  History of  section complicating pregnancy [O34.219]  Previous  delivery affecting pregnancy [O34.219]       Postpartum care following rLTC with lysis of adhesion and bilateral salpingectomy 25    High-risk pregnancy in third trimester    History of  delivery    Previous  delivery affecting pregnancy    History of  section complicating pregnancy    Postpartum care and examination immediately after delivery         Discharge Diagnosis:  section at 39w0d    Procedures:  , Low Transverse    2025   12:59 PM       Rh Immune globulin given: not applicable    Rubella vaccine given: not applicable    Discharge Date: 2025; Discharge Time: 12:31 EDT    Early Discharge:  NO    Hospital Course  Patient is a 34 y.o. female  at 39w0d status post  section with uneventful postoperative recovery.  Patient was advanced to regular diet on postoperative day#1.  On discharge, ambulating, tolerating a regular diet without any difficulties and her incision is dry, clean and intact. She declines prescription for docusate and oxycodone.     Infant:   male fetus 3013 g (6 lb 10.3 oz) with Apgar scores of 7 , 9  at five minutes.    Condition on Discharge:  Stable    Vital Signs  Temp:  [97.9 °F (36.6 °C)-98.4 °F (36.9 °C)] 98.4 °F (36.9 °C)  Heart Rate:  [63-89] 63  Resp:  [16-18] 16  BP: (102-120)/(55-80) 120/80    Lab Results   Component Value Date    WBC 9.95 2025    HGB 9.0 (L) 2025    HCT 28.8 (L) 2025    MCV 80.0 2025     2025       Discharge Disposition  Home or Self  Care    Discharge Medications     Discharge Medications        New Medications        Instructions Start Date   ibuprofen 600 MG tablet  Commonly known as: ADVIL,MOTRIN   600 mg, Oral, Every 6 Hours             Continue These Medications        Instructions Start Date   freestyle lancets   Please take your blood sugar fasting (goal <95) and 2 hours after each meal (goal <120) and record.      glucose monitor monitoring kit   1 each, Not Applicable, As Needed, Please check your sugar fasting (goal <95) and 2 hours after each meal (goal <120)      Pen Needles 31G X 5 MM misc   1 each, Not Applicable, 4 Times Daily      prenatal (CLASSIC) vitamin 28-0.8 MG tablet tablet  Generic drug: prenatal vitamin   Daily             Stop These Medications      aspirin 81 MG chewable tablet     BASAGLAR KWIKPEN 100 UNIT/ML injection pen     ferrous sulfate 325 (65 FE) MG tablet     glucose blood test strip     PROBIOTIC DAILY PO              Discharge Diet:     Activity at Discharge:   Activity Instructions       Activity as Tolerated      Other Instructions (Specify)      No driving for 2 weeks, No lifting over 10lbs for 6 weeks.    Pelvic Rest      Pelvic rest including no tampons, no tub baths, and no intercourse until 6 weeks/cleared by Provider.            Follow-up Appointments  Future Appointments   Date Time Provider Department Center   6/12/2025 10:40 AM Tootie Maher MD [facility] [place]   6/19/2025 10:40 AM Tootie Maher MD [facility] [place]   7/17/2025  1:40 PM Tootie Maher MD [facility] [place]     Additional Instructions for the Follow-ups that You Need to Schedule       Call MD for problems / concerns.   As directed      Call with foul smelling discharge, fever of >100.4, signs of postpartum depression, saturating a pad in <1 hour, blood clots larger than a golf ball.    Order Comments: Call with foul smelling discharge, fever of >100.4, signs of postpartum depression, saturating a pad in <1 hour, blood  clots larger than a golf ball.         Discharge Follow-up with Specialty: Nika; 2 Weeks   As directed      Specialty: Nika   Follow Up: 2 Weeks   Follow Up Details: incision check-already scheduled for 6/19, can cancel 6/12 appt.                CORTNEY Feliz  06/08/25  12:31 EDT  [facility]

## 2025-06-09 ENCOUNTER — MATERNAL SCREENING (OUTPATIENT)
Dept: CALL CENTER | Facility: HOSPITAL | Age: 35
End: 2025-06-09
Payer: COMMERCIAL

## 2025-06-09 NOTE — OUTREACH NOTE
Maternal Screening Survey      Flowsheet Row Responses   Eligibility Eligible   Prep survey completed? Yes   Facility patient discharged from? [facility] - Registered Nurse

## 2025-06-17 ENCOUNTER — MATERNAL SCREENING (OUTPATIENT)
Dept: CALL CENTER | Facility: HOSPITAL | Age: 35
End: 2025-06-17
Payer: COMMERCIAL

## 2025-06-17 NOTE — OUTREACH NOTE
Maternal Screening Survey      Flowsheet Row Responses   Facility patient discharged from? [place]   Attempt successful? No   Unsuccessful attempts Attempt 1              [facility] - Registered Nurse

## 2025-06-17 NOTE — OUTREACH NOTE
Maternal Screening Survey      Flowsheet Row Responses   Facility patient discharged from? [facility]   Attempt successful? No   Unsuccessful attempts Attempt 2              [facility] - Registered Nurse

## 2025-06-18 ENCOUNTER — MATERNAL SCREENING (OUTPATIENT)
Dept: CALL CENTER | Facility: HOSPITAL | Age: 35
End: 2025-06-18
Payer: COMMERCIAL

## 2025-06-18 NOTE — OUTREACH NOTE
Maternal Screening Survey      Flowsheet Row Responses   Facility patient discharged from? [facility]   Attempt successful? No   Unsuccessful attempts Attempt 3   oke Unable to reach              [facility] - Registered Nurse

## 2025-06-19 ENCOUNTER — POSTPARTUM VISIT (OUTPATIENT)
Dept: OBSTETRICS AND GYNECOLOGY | Facility: CLINIC | Age: 35
End: 2025-06-19
Payer: COMMERCIAL

## 2025-06-19 VITALS
BODY MASS INDEX: 32.25 KG/M2 | HEIGHT: 63 IN | DIASTOLIC BLOOD PRESSURE: 70 MMHG | SYSTOLIC BLOOD PRESSURE: 110 MMHG | WEIGHT: 182 LBS

## 2025-06-19 NOTE — PROGRESS NOTES
"Subjective   Chief Complaint   Patient presents with    Postpartum Care     2w0d PP RCS w/ Salpingectomy     Aye Nance is a 34 y.o. year old  presenting to be seen for her postpartum visit.  She had a Repeat  (LTCS) with bilateral total salpingectomy.  Her son is doing well.    Since delivery she has not been sexually active.  She does not have concerns about post-partum blues/depression.   [place] Score = 1  She is bottle feeding .  For ongoing contraception, her plans are s/p b/l salpingectomy.   She reports having a lot of issues with pain initially once home but starting to do better now.     The following portions of the patient's history were reviewed and updated as appropriate:current medications and allergies    Social History    Tobacco Use      Smoking status: Never      Smokeless tobacco: Never      Review of Systems  Constitutional POS: nothing reported    NEG: anorexia or night sweats   Genitourinary POS: nothing reported    NEG: dysuria or hematuria      Gastointestinal POS: nothing reported    NEG: bloating, change in bowel habits, melena, or reflux symptoms   Breast POS: nothing reported    NEG: persistent breast lump, skin dimpling, or nipple discharge        Objective   /70 (BP Location: Left arm, Patient Position: Sitting, Cuff Size: Adult)   Ht 160 cm (63\")   Wt 82.6 kg (182 lb)   LMP  (LMP Unknown)   Breastfeeding No   BMI 32.24 kg/m²     General: well developed; well nourished  no acute distress  mentation appropriate   Skin: Not performed.   Thyroid: not examined   Heart:  Not performed.   Lungs: breathing is unlabored   Breasts:  Not performed.   Abdomen: soft, non-tender; no masses  no umbilical or inguinal hernias are present  no hepato-splenomegaly  incision is clean, dry, intact, and without drainage   Pelvis: Not performed.        Assessment   Normal 2 week postpartum exam s/p repeat c/s with bilateral salpingectomy      Plan   Pelvic rest through " 6 weeks. Reviewed appropriate exercise and limiting heavy lifting.   Reviewed operative findings with patient in detail.   The importance of keeping all planned follow-up and taking all medications as prescribed was emphasized.  Follow up for postpartum visit in ~ 4 weeks    No orders of the defined types were placed in this encounter.         This note was electronically signed.    Tootie Maher MD  June 19, 2025

## (undated) DEVICE — PENCL SMOKE/EVAC MEGADYNE TELESCP 10FT

## (undated) DEVICE — TRY SPINE BLCK WHITACRE 25G 3X5IN

## (undated) DEVICE — GLV SURG BIOGEL LTX PF 6 1/2

## (undated) DEVICE — CLTH CLENS READYCLEANSE PERI CARE PK/5

## (undated) DEVICE — BOWL UTIL STRL 32OZ

## (undated) DEVICE — SUT PDS 1 TP1 48IN Z880G BX/12

## (undated) DEVICE — APPL CHLORAPREP TINTED 26ML TEAL

## (undated) DEVICE — SOL IRR NACL 0.9PCT BO 1000ML

## (undated) DEVICE — SUT GUT CHRM 1 CTX 36IN 905H

## (undated) DEVICE — SUT PLAIN  3/0 CT1 27IN 842H

## (undated) DEVICE — COVER,TABLE,HVY DUTY,60"X90",STRL: Brand: MEDLINE

## (undated) DEVICE — TRAP FLD MINIVAC MEGADYNE 100ML

## (undated) DEVICE — TBG PENCL TELESCP MEGADYNE SMOKE EVAC 10FT

## (undated) DEVICE — 4-PORT MANIFOLD: Brand: NEPTUNE 2

## (undated) DEVICE — GLV SURG BIOGEL LTX PF 6

## (undated) DEVICE — SOL IRR NACL 0.9PCT BT 1000ML

## (undated) DEVICE — MAT PREVALON MOBL TRANSFR AIR W/PAD REPROC 39X81IN

## (undated) DEVICE — PK C/SECT 10

## (undated) DEVICE — EXOFIN PRECISION PEN HIGH VISCOSITY TOPICAL SKIN ADHESIVE: Brand: EXOFIN PRECISION PEN, 1G

## (undated) DEVICE — PATIENT RETURN ELECTRODE, SINGLE-USE, CONTACT QUALITY MONITORING, ADULT, WITH 9FT CORD, FOR PATIENTS WEIGING OVER 33LBS. (15KG): Brand: MEGADYNE

## (undated) DEVICE — SOL IRR H2O BTL 1000ML STRL

## (undated) DEVICE — STPLR SKIN SUBCUTICULAR INSORB 2030

## (undated) DEVICE — SOL IRR H2O BO 1000ML STRL